# Patient Record
Sex: FEMALE | Race: WHITE | NOT HISPANIC OR LATINO | Employment: FULL TIME | URBAN - METROPOLITAN AREA
[De-identification: names, ages, dates, MRNs, and addresses within clinical notes are randomized per-mention and may not be internally consistent; named-entity substitution may affect disease eponyms.]

---

## 2017-04-06 ENCOUNTER — COMMUNICATION - HEALTHEAST (OUTPATIENT)
Dept: FAMILY MEDICINE | Facility: CLINIC | Age: 57
End: 2017-04-06

## 2017-05-08 ENCOUNTER — OFFICE VISIT - HEALTHEAST (OUTPATIENT)
Dept: CARDIOLOGY | Facility: CLINIC | Age: 57
End: 2017-05-08

## 2017-05-08 DIAGNOSIS — G45.9 TIA (TRANSIENT ISCHEMIC ATTACK): ICD-10-CM

## 2017-05-08 DIAGNOSIS — R00.1 SINUS BRADYCARDIA: ICD-10-CM

## 2017-05-08 DIAGNOSIS — I35.8 LAMBL'S EXCRESCENCE ON AORTIC VALVE: ICD-10-CM

## 2017-05-08 ASSESSMENT — MIFFLIN-ST. JEOR: SCORE: 985.03

## 2017-05-17 ENCOUNTER — HOSPITAL ENCOUNTER (OUTPATIENT)
Dept: CARDIOLOGY | Facility: HOSPITAL | Age: 57
Discharge: HOME | End: 2017-05-17
Attending: INTERNAL MEDICINE

## 2017-05-17 ENCOUNTER — RECORDS - HEALTHEAST (OUTPATIENT)
Dept: ADMINISTRATIVE | Facility: OTHER | Age: 57
End: 2017-05-17

## 2017-05-17 ENCOUNTER — OFFICE VISIT - HEALTHEAST (OUTPATIENT)
Dept: FAMILY MEDICINE | Facility: CLINIC | Age: 57
End: 2017-05-17

## 2017-05-17 DIAGNOSIS — M75.21 BICEPS TENDONITIS, RIGHT: ICD-10-CM

## 2017-05-17 DIAGNOSIS — M62.830 SPASM OF THORACIC BACK MUSCLE: ICD-10-CM

## 2017-05-17 DIAGNOSIS — M79.10 MUSCLE ACHE: ICD-10-CM

## 2017-05-17 DIAGNOSIS — H53.40 VISUAL FIELD CUT: ICD-10-CM

## 2017-05-17 DIAGNOSIS — G45.9 TIA (TRANSIENT ISCHEMIC ATTACK): ICD-10-CM

## 2017-05-17 ASSESSMENT — MIFFLIN-ST. JEOR: SCORE: 984.12

## 2017-05-18 LAB
AORTIC ROOT: 2.6 CM
BSA FOR ECHO PROCEDURE: 1.42 M2
CV BLOOD PRESSURE: NORMAL MMHG
CV ECHO HEIGHT: 61 IN
CV ECHO WEIGHT: 104 LBS
DOP CALC LVOT AREA: 2.54 CM2
DOP CALC LVOT DIAMETER: 1.8 CM
DOP CALC LVOT PEAK VEL: 108 CM/S
DOP CALC LVOT STROKE VOLUME: 75.3 CM3
DOP CALCLVOT PEAK VEL VTI: 29.6 CM
EJECTION FRACTION: 55 % (ref 55–75)
FRACTIONAL SHORTENING: 40 % (ref 28–44)
INTERVENTRICULAR SEPTUM IN END DIASTOLE: 0.9 CM (ref 0.6–0.9)
IVS/PW RATIO: 1.1
LEFT ATRIUM AREA: 10.1 CM2
LEFT ATRIUM LENGTH: 4 CM
LEFT ATRIUM SIZE: 3.1 CM
LEFT ATRIUM TO AORTIC ROOT RATIO: 1.19 NO UNITS
LEFT VENTRICLE CARDIAC INDEX: 2 L/MIN/M2
LEFT VENTRICLE CARDIAC OUTPUT: 2.9 L/MIN
LEFT VENTRICLE DIASTOLIC VOLUME INDEX: 42.3 CM3/M2 (ref 34–74)
LEFT VENTRICLE DIASTOLIC VOLUME: 60 CM3 (ref 46–106)
LEFT VENTRICLE HEART RATE: 38 BPM
LEFT VENTRICLE MASS INDEX: 71.4 G/M2
LEFT VENTRICLE SYSTOLIC VOLUME INDEX: 19 CM3/M2 (ref 11–31)
LEFT VENTRICLE SYSTOLIC VOLUME: 27 CM3 (ref 14–42)
LEFT VENTRICULAR INTERNAL DIMENSION IN DIASTOLE: 4 CM (ref 3.8–5.2)
LEFT VENTRICULAR INTERNAL DIMENSION IN SYSTOLE: 2.4 CM (ref 2.2–3.5)
LEFT VENTRICULAR MASS: 101.4 G
LEFT VENTRICULAR OUTFLOW TRACT MEAN GRADIENT: 3 MMHG
LEFT VENTRICULAR OUTFLOW TRACT MEAN VELOCITY: 75.1 CM/S
LEFT VENTRICULAR OUTFLOW TRACT PEAK GRADIENT: 5 MMHG
LEFT VENTRICULAR POSTERIOR WALL IN END DIASTOLE: 0.8 CM (ref 0.6–0.9)
LV STROKE VOLUME INDEX: 53 ML/M2
MITRAL VALVE E/A RATIO: 2.8
MV AVERAGE E/E' RATIO: 12.4 CM/S
MV DECELERATION TIME: 174 MS
MV E'TISSUE VEL-LAT: 9.07 CM/S
MV E'TISSUE VEL-MED: 8.68 CM/S
MV LATERAL E/E' RATIO: 12.1
MV MEDIAL E/E' RATIO: 12.7
MV PEAK A VELOCITY: 39.5 CM/S
MV PEAK E VELOCITY: 110 CM/S
NUC REST DIASTOLIC VOLUME INDEX: 1664 LBS
NUC REST SYSTOLIC VOLUME INDEX: 61 IN
TRICUSPID REGURGITATION PEAK PRESSURE GRADIENT: 13.1 MMHG
TRICUSPID VALVE ANULAR PLANE SYSTOLIC EXCURSION: 1.8 CM
TRICUSPID VALVE PEAK REGURGITANT VELOCITY: 181 CM/S

## 2017-05-30 ENCOUNTER — COMMUNICATION - HEALTHEAST (OUTPATIENT)
Dept: CARDIOLOGY | Facility: CLINIC | Age: 57
End: 2017-05-30

## 2017-05-30 ENCOUNTER — RECORDS - HEALTHEAST (OUTPATIENT)
Dept: ADMINISTRATIVE | Facility: OTHER | Age: 57
End: 2017-05-30

## 2017-06-01 ENCOUNTER — COMMUNICATION - HEALTHEAST (OUTPATIENT)
Dept: FAMILY MEDICINE | Facility: CLINIC | Age: 57
End: 2017-06-01

## 2017-06-19 ENCOUNTER — COMMUNICATION - HEALTHEAST (OUTPATIENT)
Dept: FAMILY MEDICINE | Facility: CLINIC | Age: 57
End: 2017-06-19

## 2017-06-19 ENCOUNTER — COMMUNICATION - HEALTHEAST (OUTPATIENT)
Dept: CARDIOLOGY | Facility: CLINIC | Age: 57
End: 2017-06-19

## 2017-08-14 ENCOUNTER — OFFICE VISIT - HEALTHEAST (OUTPATIENT)
Dept: CARDIOLOGY | Facility: CLINIC | Age: 57
End: 2017-08-14

## 2017-08-14 DIAGNOSIS — G45.9 TIA (TRANSIENT ISCHEMIC ATTACK): ICD-10-CM

## 2017-08-14 ASSESSMENT — MIFFLIN-ST. JEOR: SCORE: 990.01

## 2017-09-23 ENCOUNTER — RECORDS - HEALTHEAST (OUTPATIENT)
Dept: ADMINISTRATIVE | Facility: OTHER | Age: 57
End: 2017-09-23

## 2017-09-29 ENCOUNTER — RECORDS - HEALTHEAST (OUTPATIENT)
Dept: ADMINISTRATIVE | Facility: OTHER | Age: 57
End: 2017-09-29

## 2017-10-09 ENCOUNTER — HOSPITAL ENCOUNTER (OUTPATIENT)
Dept: MAMMOGRAPHY | Facility: HOSPITAL | Age: 57
Discharge: HOME | End: 2017-10-09
Attending: FAMILY MEDICINE

## 2017-10-09 DIAGNOSIS — Z12.31 VISIT FOR SCREENING MAMMOGRAM: ICD-10-CM

## 2017-11-20 ENCOUNTER — HOSPITAL ENCOUNTER (OUTPATIENT)
Dept: CARDIOLOGY | Facility: HOSPITAL | Age: 57
Discharge: HOME | End: 2017-11-20
Attending: INTERNAL MEDICINE

## 2017-11-20 DIAGNOSIS — G45.9 TIA (TRANSIENT ISCHEMIC ATTACK): ICD-10-CM

## 2017-11-20 LAB
AORTIC ROOT: 2.6 CM
BSA FOR ECHO PROCEDURE: 1.41 M2
CV BLOOD PRESSURE: NORMAL MMHG
CV ECHO HEIGHT: 62 IN
CV ECHO WEIGHT: 101 LBS
DOP CALC LVOT AREA: 2.54 CM2
DOP CALC LVOT DIAMETER: 1.8 CM
DOP CALC LVOT PEAK VEL: 109 CM/S
DOP CALC LVOT STROKE VOLUME: 62.3 CM3
DOP CALCLVOT PEAK VEL VTI: 24.5 CM
EJECTION FRACTION: 58 % (ref 55–75)
FRACTIONAL SHORTENING: 30.1 % (ref 28–44)
INTERVENTRICULAR SEPTUM IN END DIASTOLE: 0.73 CM (ref 0.6–0.9)
IVS/PW RATIO: 1
LA AREA 1: 15 CM2
LA AREA 2: 11 CM2
LEFT ATRIUM LENGTH: 4.3 CM
LEFT ATRIUM SIZE: 2.7 CM
LEFT ATRIUM VOLUME INDEX: 23.1 ML/M2
LEFT ATRIUM VOLUME: 32.6 CM3
LEFT VENTRICLE CARDIAC INDEX: 2.2 L/MIN/M2
LEFT VENTRICLE CARDIAC OUTPUT: 3.1 L/MIN
LEFT VENTRICLE DIASTOLIC VOLUME INDEX: 56.2 CM3/M2 (ref 34–74)
LEFT VENTRICLE DIASTOLIC VOLUME: 79.2 CM3 (ref 46–106)
LEFT VENTRICLE HEART RATE: 50 BPM
LEFT VENTRICLE MASS INDEX: 64.2 G/M2
LEFT VENTRICLE SYSTOLIC VOLUME INDEX: 23.4 CM3/M2 (ref 11–31)
LEFT VENTRICLE SYSTOLIC VOLUME: 33 CM3 (ref 14–42)
LEFT VENTRICULAR INTERNAL DIMENSION IN DIASTOLE: 4.25 CM (ref 3.8–5.2)
LEFT VENTRICULAR INTERNAL DIMENSION IN SYSTOLE: 2.97 CM (ref 2.2–3.5)
LEFT VENTRICULAR MASS: 90.5 G
LEFT VENTRICULAR OUTFLOW TRACT MEAN GRADIENT: 3 MMHG
LEFT VENTRICULAR OUTFLOW TRACT MEAN VELOCITY: 77.9 CM/S
LEFT VENTRICULAR OUTFLOW TRACT PEAK GRADIENT: 5 MMHG
LEFT VENTRICULAR POSTERIOR WALL IN END DIASTOLE: 0.71 CM (ref 0.6–0.9)
LV STROKE VOLUME INDEX: 44.2 ML/M2
MITRAL VALVE DECELERATION SLOPE: 3280 MM/S2
MITRAL VALVE DECELERATION SLOPE: 3280 MM/S2
MITRAL VALVE E/A RATIO: 1.6
MITRAL VALVE PRESSURE HALF-TIME: 78 MS
MV AVERAGE E/E' RATIO: 8.3 CM/S
MV E'TISSUE VEL-LAT: 11.9 CM/S
MV E'TISSUE VEL-MED: 9.28 CM/S
MV LATERAL E/E' RATIO: 7.4
MV MEDIAL E/E' RATIO: 9.5
MV PEAK A VELOCITY: 54.8 CM/S
MV PEAK E VELOCITY: 88.3 CM/S
MV VALVE AREA PRESSURE 1/2 METHOD: 2.8 CM2
NUC REST DIASTOLIC VOLUME INDEX: 1616 LBS
NUC REST SYSTOLIC VOLUME INDEX: 62 IN
TRICUSPID VALVE ANULAR PLANE SYSTOLIC EXCURSION: 2.1 CM

## 2017-11-20 ASSESSMENT — MIFFLIN-ST. JEOR: SCORE: 986.38

## 2017-11-21 ENCOUNTER — RECORDS - HEALTHEAST (OUTPATIENT)
Dept: ADMINISTRATIVE | Facility: OTHER | Age: 57
End: 2017-11-21

## 2017-11-21 ENCOUNTER — AMBULATORY - HEALTHEAST (OUTPATIENT)
Dept: MULTI SPECIALTY CLINIC | Facility: CLINIC | Age: 57
End: 2017-11-21

## 2017-11-21 LAB — PAP SMEAR - HIM PATIENT REPORTED: NORMAL

## 2017-11-22 ENCOUNTER — AMBULATORY - HEALTHEAST (OUTPATIENT)
Dept: SCHEDULING | Facility: CLINIC | Age: 57
End: 2017-11-22

## 2017-11-22 ENCOUNTER — RECORDS - HEALTHEAST (OUTPATIENT)
Dept: ADMINISTRATIVE | Facility: OTHER | Age: 57
End: 2017-11-22

## 2017-11-22 DIAGNOSIS — M85.80 OSTEOPENIA: ICD-10-CM

## 2017-11-22 DIAGNOSIS — Z78.0 MENOPAUSE: ICD-10-CM

## 2017-12-29 ENCOUNTER — OFFICE VISIT - HEALTHEAST (OUTPATIENT)
Dept: FAMILY MEDICINE | Facility: CLINIC | Age: 57
End: 2017-12-29

## 2017-12-29 DIAGNOSIS — H61.23 BILATERAL IMPACTED CERUMEN: ICD-10-CM

## 2017-12-29 DIAGNOSIS — R07.81 RIB PAIN ON RIGHT SIDE: ICD-10-CM

## 2018-01-02 ENCOUNTER — RECORDS - HEALTHEAST (OUTPATIENT)
Dept: ADMINISTRATIVE | Facility: OTHER | Age: 58
End: 2018-01-02

## 2018-01-25 ENCOUNTER — OFFICE VISIT - HEALTHEAST (OUTPATIENT)
Dept: FAMILY MEDICINE | Facility: CLINIC | Age: 58
End: 2018-01-25

## 2018-01-25 DIAGNOSIS — K60.2 ANAL FISSURE: ICD-10-CM

## 2018-01-25 DIAGNOSIS — N76.0 VAGINITIS: ICD-10-CM

## 2018-01-25 LAB
ALBUMIN UR-MCNC: ABNORMAL MG/DL
APPEARANCE UR: CLEAR
BILIRUB UR QL STRIP: NEGATIVE
COLOR UR AUTO: YELLOW
GLUCOSE UR STRIP-MCNC: NEGATIVE MG/DL
HGB UR QL STRIP: ABNORMAL
KETONES UR STRIP-MCNC: NEGATIVE MG/DL
LEUKOCYTE ESTERASE UR QL STRIP: ABNORMAL
NITRATE UR QL: NEGATIVE
PH UR STRIP: 7.5 [PH] (ref 5–8)
SP GR UR STRIP: 1.01 (ref 1–1.03)
UROBILINOGEN UR STRIP-ACNC: ABNORMAL

## 2018-01-25 ASSESSMENT — MIFFLIN-ST. JEOR: SCORE: 1006.34

## 2018-01-27 LAB — BACTERIA SPEC CULT: NO GROWTH

## 2018-01-28 LAB
MISCELLANEOUS TEST DEPT. - HE HISTORICAL: NORMAL
PERFORMING LAB: NORMAL
SPECIMEN STATUS: NORMAL
TEST NAME: NORMAL

## 2018-04-13 ENCOUNTER — OFFICE VISIT - HEALTHEAST (OUTPATIENT)
Dept: ENDOCRINOLOGY | Facility: CLINIC | Age: 58
End: 2018-04-13

## 2018-04-13 ENCOUNTER — COMMUNICATION - HEALTHEAST (OUTPATIENT)
Dept: ENDOCRINOLOGY | Facility: CLINIC | Age: 58
End: 2018-04-13

## 2018-04-13 DIAGNOSIS — E03.9 HYPOTHYROID: ICD-10-CM

## 2018-04-13 DIAGNOSIS — M81.0 OSTEOPOROSIS: ICD-10-CM

## 2018-04-13 LAB
CALCIUM SERPL-MCNC: 9.4 MG/DL (ref 8.5–10.5)
CREAT SERPL-MCNC: 0.68 MG/DL (ref 0.6–1.1)
GFR SERPL CREATININE-BSD FRML MDRD: >60 ML/MIN/1.73M2
POTASSIUM BLD-SCNC: 4.4 MMOL/L (ref 3.5–5)
PTH-INTACT SERPL-MCNC: 112 PG/ML (ref 10–86)
T3 SERPL-MCNC: 67 NG/DL (ref 45–175)
T4 FREE SERPL-MCNC: 0.7 NG/DL (ref 0.7–1.8)
TSH SERPL DL<=0.005 MIU/L-ACNC: 2.17 UIU/ML (ref 0.3–5)

## 2018-04-13 ASSESSMENT — MIFFLIN-ST. JEOR: SCORE: 1015.87

## 2018-04-16 ENCOUNTER — COMMUNICATION - HEALTHEAST (OUTPATIENT)
Dept: ENDOCRINOLOGY | Facility: CLINIC | Age: 58
End: 2018-04-16

## 2018-04-16 LAB
25(OH)D3 SERPL-MCNC: 51.4 NG/ML (ref 30–80)
25(OH)D3 SERPL-MCNC: 51.4 NG/ML (ref 30–80)
PHOSPHATE SERPL-MCNC: 3.4 MG/DL (ref 2.5–4.5)

## 2018-04-17 ENCOUNTER — COMMUNICATION - HEALTHEAST (OUTPATIENT)
Dept: ADMINISTRATIVE | Facility: CLINIC | Age: 58
End: 2018-04-17

## 2018-04-18 ENCOUNTER — AMBULATORY - HEALTHEAST (OUTPATIENT)
Dept: ENDOCRINOLOGY | Facility: CLINIC | Age: 58
End: 2018-04-18

## 2018-04-18 LAB — 1,25(OH)2D SERPL-MCNC: 45 PG/ML (ref 18–78)

## 2018-04-23 ENCOUNTER — COMMUNICATION - HEALTHEAST (OUTPATIENT)
Dept: ENDOCRINOLOGY | Facility: CLINIC | Age: 58
End: 2018-04-23

## 2018-04-24 ENCOUNTER — COMMUNICATION - HEALTHEAST (OUTPATIENT)
Dept: FAMILY MEDICINE | Facility: CLINIC | Age: 58
End: 2018-04-24

## 2018-04-26 ENCOUNTER — OFFICE VISIT - HEALTHEAST (OUTPATIENT)
Dept: FAMILY MEDICINE | Facility: CLINIC | Age: 58
End: 2018-04-26

## 2018-04-26 ENCOUNTER — COMMUNICATION - HEALTHEAST (OUTPATIENT)
Dept: ENDOCRINOLOGY | Facility: CLINIC | Age: 58
End: 2018-04-26

## 2018-04-26 ENCOUNTER — COMMUNICATION - HEALTHEAST (OUTPATIENT)
Dept: FAMILY MEDICINE | Facility: CLINIC | Age: 58
End: 2018-04-26

## 2018-04-26 DIAGNOSIS — R43.0 LOSS OF SENSE OF SMELL: ICD-10-CM

## 2018-04-26 DIAGNOSIS — L65.9 HAIR LOSS: ICD-10-CM

## 2018-04-26 DIAGNOSIS — M25.50 JOINT PAIN: ICD-10-CM

## 2018-04-26 DIAGNOSIS — R51.9 HEADACHE: ICD-10-CM

## 2018-04-26 DIAGNOSIS — R10.9 ABDOMINAL PAIN: ICD-10-CM

## 2018-04-26 LAB
ALBUMIN SERPL-MCNC: 4 G/DL (ref 3.5–5)
ALP SERPL-CCNC: 88 U/L (ref 45–120)
ALT SERPL W P-5'-P-CCNC: 16 U/L (ref 0–45)
AMYLASE SERPL-CCNC: 150 U/L (ref 5–120)
ANION GAP SERPL CALCULATED.3IONS-SCNC: 12 MMOL/L (ref 5–18)
AST SERPL W P-5'-P-CCNC: 33 U/L (ref 0–40)
BILIRUB SERPL-MCNC: 1.4 MG/DL (ref 0–1)
BUN SERPL-MCNC: 14 MG/DL (ref 8–22)
CALCIUM SERPL-MCNC: 10.6 MG/DL (ref 8.5–10.5)
CHLORIDE BLD-SCNC: 104 MMOL/L (ref 98–107)
CK SERPL-CCNC: 89 U/L (ref 30–190)
CO2 SERPL-SCNC: 26 MMOL/L (ref 22–31)
CORTIS SERPL-MCNC: 12.3 UG/DL
CREAT SERPL-MCNC: 0.8 MG/DL (ref 0.6–1.1)
ERYTHROCYTE [SEDIMENTATION RATE] IN BLOOD BY WESTERGREN METHOD: 8 MM/HR (ref 0–20)
GFR SERPL CREATININE-BSD FRML MDRD: >60 ML/MIN/1.73M2
GLUCOSE BLD-MCNC: 104 MG/DL (ref 70–125)
LIPASE SERPL-CCNC: 81 U/L (ref 0–52)
POTASSIUM BLD-SCNC: 4.4 MMOL/L (ref 3.5–5)
PROT SERPL-MCNC: 7.2 G/DL (ref 6–8)
SODIUM SERPL-SCNC: 142 MMOL/L (ref 136–145)

## 2018-04-27 LAB — HCV AB SERPL QL IA: NEGATIVE

## 2018-04-28 ENCOUNTER — COMMUNICATION - HEALTHEAST (OUTPATIENT)
Dept: FAMILY MEDICINE | Facility: CLINIC | Age: 58
End: 2018-04-28

## 2018-04-30 ENCOUNTER — COMMUNICATION - HEALTHEAST (OUTPATIENT)
Dept: FAMILY MEDICINE | Facility: CLINIC | Age: 58
End: 2018-04-30

## 2018-04-30 ENCOUNTER — AMBULATORY - HEALTHEAST (OUTPATIENT)
Dept: FAMILY MEDICINE | Facility: CLINIC | Age: 58
End: 2018-04-30

## 2018-04-30 ENCOUNTER — HOSPITAL ENCOUNTER (OUTPATIENT)
Dept: CT IMAGING | Facility: HOSPITAL | Age: 58
Discharge: HOME | End: 2018-04-30
Attending: FAMILY MEDICINE

## 2018-04-30 DIAGNOSIS — R10.9 ABDOMINAL PAIN: ICD-10-CM

## 2018-04-30 LAB — ANA SER QL: 0.3 U

## 2018-05-01 ENCOUNTER — COMMUNICATION - HEALTHEAST (OUTPATIENT)
Dept: FAMILY MEDICINE | Facility: CLINIC | Age: 58
End: 2018-05-01

## 2018-05-09 ENCOUNTER — COMMUNICATION - HEALTHEAST (OUTPATIENT)
Dept: ENDOCRINOLOGY | Facility: CLINIC | Age: 58
End: 2018-05-09

## 2018-05-09 DIAGNOSIS — M81.0 OSTEOPOROSIS: ICD-10-CM

## 2018-06-06 ENCOUNTER — COMMUNICATION - HEALTHEAST (OUTPATIENT)
Dept: ADMINISTRATIVE | Facility: CLINIC | Age: 58
End: 2018-06-06

## 2018-06-20 ENCOUNTER — RECORDS - HEALTHEAST (OUTPATIENT)
Dept: ADMINISTRATIVE | Facility: OTHER | Age: 58
End: 2018-06-20

## 2018-06-21 ENCOUNTER — RECORDS - HEALTHEAST (OUTPATIENT)
Dept: ADMINISTRATIVE | Facility: OTHER | Age: 58
End: 2018-06-21

## 2018-06-26 ENCOUNTER — HOSPITAL ENCOUNTER (OUTPATIENT)
Dept: MRI IMAGING | Facility: HOSPITAL | Age: 58
Discharge: HOME | End: 2018-06-26
Attending: INTERNAL MEDICINE

## 2018-06-26 DIAGNOSIS — R74.8 ELEVATED AMYLASE AND LIPASE: ICD-10-CM

## 2018-06-27 ENCOUNTER — HOSPITAL ENCOUNTER (OUTPATIENT)
Dept: MRI IMAGING | Facility: HOSPITAL | Age: 58
Discharge: HOME | End: 2018-06-27
Attending: FAMILY MEDICINE

## 2018-06-27 DIAGNOSIS — R51.9 HEADACHE: ICD-10-CM

## 2018-06-27 DIAGNOSIS — R43.0 LOSS OF SENSE OF SMELL: ICD-10-CM

## 2018-07-16 ENCOUNTER — COMMUNICATION - HEALTHEAST (OUTPATIENT)
Dept: ADMINISTRATIVE | Facility: CLINIC | Age: 58
End: 2018-07-16

## 2018-07-26 ENCOUNTER — RECORDS - HEALTHEAST (OUTPATIENT)
Dept: LAB | Facility: HOSPITAL | Age: 58
End: 2018-07-26

## 2018-07-26 LAB
HBV SURFACE AG SERPL QL IA: NEGATIVE
HCV AB SERPL QL IA: NEGATIVE
HIV 1+2 AB+HIV1 P24 AG SERPL QL IA: NEGATIVE

## 2018-08-01 ENCOUNTER — COMMUNICATION - HEALTHEAST (OUTPATIENT)
Dept: ENDOCRINOLOGY | Facility: CLINIC | Age: 58
End: 2018-08-01

## 2018-08-01 DIAGNOSIS — M81.0 OSTEOPOROSIS: ICD-10-CM

## 2018-08-20 ENCOUNTER — OFFICE VISIT - HEALTHEAST (OUTPATIENT)
Dept: CARDIOLOGY | Facility: CLINIC | Age: 58
End: 2018-08-20

## 2018-08-20 DIAGNOSIS — R42 DIZZINESS: ICD-10-CM

## 2018-08-20 DIAGNOSIS — M79.622 PAIN OF LEFT UPPER ARM: ICD-10-CM

## 2018-08-20 DIAGNOSIS — Z82.49 FAMILY HISTORY OF CORONARY ARTERY DISEASE: ICD-10-CM

## 2018-08-20 LAB
ATRIAL RATE - MUSE: 35 BPM
DIASTOLIC BLOOD PRESSURE - MUSE: NORMAL MMHG
INTERPRETATION ECG - MUSE: NORMAL
P AXIS - MUSE: -7 DEGREES
PR INTERVAL - MUSE: 122 MS
QRS DURATION - MUSE: 88 MS
QT - MUSE: 478 MS
QTC - MUSE: 364 MS
R AXIS - MUSE: 78 DEGREES
SYSTOLIC BLOOD PRESSURE - MUSE: NORMAL MMHG
T AXIS - MUSE: 57 DEGREES
VENTRICULAR RATE- MUSE: 35 BPM

## 2018-08-20 ASSESSMENT — MIFFLIN-ST. JEOR: SCORE: 1013.6

## 2018-08-22 ENCOUNTER — HOSPITAL ENCOUNTER (OUTPATIENT)
Dept: CT IMAGING | Facility: CLINIC | Age: 58
Discharge: HOME | End: 2018-08-22
Attending: INTERNAL MEDICINE

## 2018-08-22 ENCOUNTER — COMMUNICATION - HEALTHEAST (OUTPATIENT)
Dept: CARDIOLOGY | Facility: CLINIC | Age: 58
End: 2018-08-22

## 2018-08-22 DIAGNOSIS — Z82.49 FAMILY HISTORY OF CORONARY ARTERY DISEASE: ICD-10-CM

## 2018-08-22 LAB
CV CALCIUM SCORE AGATSTON LM: 0
CV CALCIUM SCORING AGATSON LAD: 0
CV CALCIUM SCORING AGATSTON CX: 0
CV CALCIUM SCORING AGATSTON RCA: 0
CV CALCIUM SCORING AGATSTON TOTAL: 0

## 2018-08-27 ENCOUNTER — COMMUNICATION - HEALTHEAST (OUTPATIENT)
Dept: CARDIOLOGY | Facility: CLINIC | Age: 58
End: 2018-08-27

## 2018-08-28 ENCOUNTER — HOSPITAL ENCOUNTER (OUTPATIENT)
Dept: CARDIOLOGY | Facility: HOSPITAL | Age: 58
Discharge: HOME | End: 2018-08-28
Attending: INTERNAL MEDICINE

## 2018-08-28 DIAGNOSIS — M79.622 PAIN OF LEFT UPPER ARM: ICD-10-CM

## 2018-08-28 LAB
CV STRESS CURRENT BP HE: NORMAL
CV STRESS CURRENT HR HE: 105
CV STRESS CURRENT HR HE: 109
CV STRESS CURRENT HR HE: 110
CV STRESS CURRENT HR HE: 111
CV STRESS CURRENT HR HE: 114
CV STRESS CURRENT HR HE: 118
CV STRESS CURRENT HR HE: 118
CV STRESS CURRENT HR HE: 121
CV STRESS CURRENT HR HE: 123
CV STRESS CURRENT HR HE: 146
CV STRESS CURRENT HR HE: 149
CV STRESS CURRENT HR HE: 154
CV STRESS CURRENT HR HE: 154
CV STRESS CURRENT HR HE: 155
CV STRESS CURRENT HR HE: 155
CV STRESS CURRENT HR HE: 51
CV STRESS CURRENT HR HE: 52
CV STRESS CURRENT HR HE: 56
CV STRESS CURRENT HR HE: 59
CV STRESS CURRENT HR HE: 61
CV STRESS CURRENT HR HE: 67
CV STRESS CURRENT HR HE: 69
CV STRESS CURRENT HR HE: 72
CV STRESS CURRENT HR HE: 74
CV STRESS CURRENT HR HE: 74
CV STRESS CURRENT HR HE: 76
CV STRESS CURRENT HR HE: 77
CV STRESS CURRENT HR HE: 79
CV STRESS CURRENT HR HE: 90
CV STRESS CURRENT HR HE: 92
CV STRESS CURRENT HR HE: 93
CV STRESS CURRENT HR HE: 95
CV STRESS CURRENT HR HE: 97
CV STRESS DEVIATION TIME HE: NORMAL
CV STRESS ECHO PERCENT HR HE: NORMAL
CV STRESS EXERCISE STAGE HE: NORMAL
CV STRESS FINAL RESTING BP HE: NORMAL
CV STRESS FINAL RESTING HR HE: 56
CV STRESS MAX HR HE: 155
CV STRESS MAX TREADMILL GRADE HE: 18
CV STRESS MAX TREADMILL SPEED HE: 5
CV STRESS PEAK DIA BP HE: NORMAL
CV STRESS PEAK SYS BP HE: NORMAL
CV STRESS PHASE HE: NORMAL
CV STRESS PROTOCOL HE: NORMAL
CV STRESS REASON STOPPED HE: NORMAL
CV STRESS RESTING PT POSITION HE: NORMAL
CV STRESS RESTING PT POSITION HE: NORMAL
CV STRESS ST DEVIATION AMOUNT HE: NORMAL
CV STRESS ST DEVIATION ELEVATION HE: NORMAL
CV STRESS ST EVELATION AMOUNT HE: NORMAL
CV STRESS SYMPTOMS HE: NORMAL
CV STRESS TEST TYPE HE: NORMAL
CV STRESS TOTAL STAGE TIME MIN 1 HE: NORMAL
ECHO EJECTION FRACTION ESTIMATED: 55 %
STRESS ECHO BASELINE BP: NORMAL
STRESS ECHO BASELINE HR: 70
STRESS ECHO CALCULATED PERCENT HR: 95 %
STRESS ECHO LAST STRESS BP: NORMAL
STRESS ECHO LAST STRESS HR: 155
STRESS ECHO POST ESTIMATED WORKLOAD: 12.3
STRESS ECHO POST EXERCISE DUR MIN: 12
STRESS ECHO POST EXERCISE DUR SEC: 0
STRESS ECHO TARGET HR: 139

## 2018-08-30 ENCOUNTER — COMMUNICATION - HEALTHEAST (OUTPATIENT)
Dept: CARDIOLOGY | Facility: CLINIC | Age: 58
End: 2018-08-30

## 2018-09-05 ENCOUNTER — HOSPITAL ENCOUNTER (OUTPATIENT)
Dept: CARDIOLOGY | Facility: HOSPITAL | Age: 58
Discharge: HOME | End: 2018-09-05
Attending: INTERNAL MEDICINE

## 2018-09-05 DIAGNOSIS — R42 DIZZINESS: ICD-10-CM

## 2018-09-23 ENCOUNTER — COMMUNICATION - HEALTHEAST (OUTPATIENT)
Dept: CARDIOLOGY | Facility: CLINIC | Age: 58
End: 2018-09-23

## 2018-09-24 ENCOUNTER — RECORDS - HEALTHEAST (OUTPATIENT)
Dept: ADMINISTRATIVE | Facility: OTHER | Age: 58
End: 2018-09-24

## 2018-09-27 ENCOUNTER — COMMUNICATION - HEALTHEAST (OUTPATIENT)
Dept: CARDIOLOGY | Facility: CLINIC | Age: 58
End: 2018-09-27

## 2018-11-02 ENCOUNTER — HOSPITAL ENCOUNTER (OUTPATIENT)
Dept: MAMMOGRAPHY | Facility: CLINIC | Age: 58
Discharge: HOME | End: 2018-11-02
Attending: FAMILY MEDICINE

## 2018-11-02 DIAGNOSIS — Z12.31 VISIT FOR SCREENING MAMMOGRAM: ICD-10-CM

## 2018-11-15 ENCOUNTER — HOSPITAL ENCOUNTER (OUTPATIENT)
Dept: MRI IMAGING | Facility: HOSPITAL | Age: 58
Discharge: HOME | End: 2018-11-15
Attending: ORTHOPAEDIC SURGERY

## 2018-11-15 DIAGNOSIS — M54.2 NECK PAIN: ICD-10-CM

## 2018-11-15 DIAGNOSIS — M25.512 PAIN IN JOINT OF LEFT SHOULDER: ICD-10-CM

## 2018-11-21 ENCOUNTER — OFFICE VISIT - HEALTHEAST (OUTPATIENT)
Dept: CARDIOLOGY | Facility: CLINIC | Age: 58
End: 2018-11-21

## 2018-11-21 DIAGNOSIS — R00.1 SINUS BRADYCARDIA: ICD-10-CM

## 2018-11-21 DIAGNOSIS — R42 DIZZINESS AND GIDDINESS: ICD-10-CM

## 2018-11-21 ASSESSMENT — MIFFLIN-ST. JEOR: SCORE: 1009.06

## 2018-11-30 ENCOUNTER — COMMUNICATION - HEALTHEAST (OUTPATIENT)
Dept: ENDOCRINOLOGY | Facility: CLINIC | Age: 58
End: 2018-11-30

## 2018-11-30 DIAGNOSIS — E03.9 HYPOTHYROID: ICD-10-CM

## 2018-11-30 DIAGNOSIS — M81.0 OSTEOPOROSIS: ICD-10-CM

## 2018-11-30 DIAGNOSIS — E55.9 VITAMIN D DEFICIENCY: ICD-10-CM

## 2018-12-03 ENCOUNTER — AMBULATORY - HEALTHEAST (OUTPATIENT)
Dept: LAB | Facility: CLINIC | Age: 58
End: 2018-12-03

## 2018-12-03 DIAGNOSIS — E03.9 HYPOTHYROID: ICD-10-CM

## 2018-12-03 DIAGNOSIS — M81.0 OSTEOPOROSIS: ICD-10-CM

## 2018-12-03 DIAGNOSIS — E55.9 VITAMIN D DEFICIENCY: ICD-10-CM

## 2018-12-03 LAB
CALCIUM SERPL-MCNC: 9.7 MG/DL (ref 8.5–10.5)
CREAT SERPL-MCNC: 0.68 MG/DL (ref 0.6–1.1)
GFR SERPL CREATININE-BSD FRML MDRD: >60 ML/MIN/1.73M2
POTASSIUM BLD-SCNC: 4.2 MMOL/L (ref 3.5–5)
T3 SERPL-MCNC: 66 NG/DL (ref 45–175)
T4 FREE SERPL-MCNC: 0.8 NG/DL (ref 0.7–1.8)
TSH SERPL DL<=0.005 MIU/L-ACNC: 1.96 UIU/ML (ref 0.3–5)

## 2018-12-04 LAB
25(OH)D3 SERPL-MCNC: 27.7 NG/ML (ref 30–80)
25(OH)D3 SERPL-MCNC: 27.7 NG/ML (ref 30–80)

## 2018-12-21 ENCOUNTER — OFFICE VISIT - HEALTHEAST (OUTPATIENT)
Dept: ENDOCRINOLOGY | Facility: CLINIC | Age: 58
End: 2018-12-21

## 2018-12-21 ENCOUNTER — COMMUNICATION - HEALTHEAST (OUTPATIENT)
Dept: ENDOCRINOLOGY | Facility: CLINIC | Age: 58
End: 2018-12-21

## 2018-12-21 DIAGNOSIS — M81.0 AGE-RELATED OSTEOPOROSIS WITHOUT CURRENT PATHOLOGICAL FRACTURE: ICD-10-CM

## 2018-12-21 ASSESSMENT — MIFFLIN-ST. JEOR: SCORE: 994.55

## 2019-01-04 ENCOUNTER — RECORDS - HEALTHEAST (OUTPATIENT)
Dept: LAB | Facility: HOSPITAL | Age: 59
End: 2019-01-04

## 2019-01-04 LAB
MUV IGG SER QL IA: POSITIVE
VZV IGG SER QL IA: POSITIVE

## 2019-01-16 ENCOUNTER — COMMUNICATION - HEALTHEAST (OUTPATIENT)
Dept: ENDOCRINOLOGY | Facility: CLINIC | Age: 59
End: 2019-01-16

## 2019-01-29 ENCOUNTER — OFFICE VISIT - HEALTHEAST (OUTPATIENT)
Dept: FAMILY MEDICINE | Facility: CLINIC | Age: 59
End: 2019-01-29

## 2019-01-29 DIAGNOSIS — M54.2 NECK PAIN: ICD-10-CM

## 2019-01-29 DIAGNOSIS — K64.4 EXTERNAL HEMORRHOIDS: ICD-10-CM

## 2019-01-29 DIAGNOSIS — R20.2 PARESTHESIA: ICD-10-CM

## 2019-01-29 RX ORDER — ASPIRIN 325 MG
325 TABLET ORAL DAILY
Status: SHIPPED | COMMUNITY
Start: 2019-01-29 | End: 2022-07-05

## 2019-01-29 ASSESSMENT — MIFFLIN-ST. JEOR: SCORE: 988.65

## 2019-02-20 ENCOUNTER — COMMUNICATION - HEALTHEAST (OUTPATIENT)
Dept: ADMINISTRATIVE | Facility: CLINIC | Age: 59
End: 2019-02-20

## 2019-02-20 ENCOUNTER — OFFICE VISIT - HEALTHEAST (OUTPATIENT)
Dept: OTOLARYNGOLOGY | Facility: CLINIC | Age: 59
End: 2019-02-20

## 2019-02-20 DIAGNOSIS — H61.23 BILATERAL IMPACTED CERUMEN: ICD-10-CM

## 2019-02-21 ENCOUNTER — AMBULATORY - HEALTHEAST (OUTPATIENT)
Dept: ENDOCRINOLOGY | Facility: CLINIC | Age: 59
End: 2019-02-21

## 2019-02-21 ENCOUNTER — COMMUNICATION - HEALTHEAST (OUTPATIENT)
Dept: ENDOCRINOLOGY | Facility: CLINIC | Age: 59
End: 2019-02-21

## 2019-05-13 ENCOUNTER — OFFICE VISIT - HEALTHEAST (OUTPATIENT)
Dept: FAMILY MEDICINE | Facility: CLINIC | Age: 59
End: 2019-05-13

## 2019-05-13 DIAGNOSIS — G43.009 ATYPICAL MIGRAINE: ICD-10-CM

## 2019-05-13 DIAGNOSIS — L30.9 DERMATITIS: ICD-10-CM

## 2019-05-13 DIAGNOSIS — L02.92 BOIL: ICD-10-CM

## 2019-05-13 DIAGNOSIS — B37.31 VAGINAL CANDIDIASIS: ICD-10-CM

## 2019-05-13 DIAGNOSIS — R11.0 NAUSEA: ICD-10-CM

## 2019-05-19 ENCOUNTER — OFFICE VISIT - HEALTHEAST (OUTPATIENT)
Dept: FAMILY MEDICINE | Facility: CLINIC | Age: 59
End: 2019-05-19

## 2019-05-19 DIAGNOSIS — R51.9 HEADACHE: ICD-10-CM

## 2019-05-19 DIAGNOSIS — J01.10 ACUTE NON-RECURRENT FRONTAL SINUSITIS: ICD-10-CM

## 2019-05-19 DIAGNOSIS — R05.9 COUGH: ICD-10-CM

## 2019-05-19 LAB
DEPRECATED S PYO AG THROAT QL EIA: NORMAL
FLUAV AG SPEC QL IA: NORMAL
FLUBV AG SPEC QL IA: NORMAL

## 2019-05-20 LAB — GROUP A STREP BY PCR: NORMAL

## 2019-07-24 ENCOUNTER — COMMUNICATION - HEALTHEAST (OUTPATIENT)
Dept: ADMINISTRATIVE | Facility: CLINIC | Age: 59
End: 2019-07-24

## 2019-08-28 ENCOUNTER — AMBULATORY - HEALTHEAST (OUTPATIENT)
Dept: ENDOCRINOLOGY | Facility: CLINIC | Age: 59
End: 2019-08-28

## 2019-08-28 DIAGNOSIS — M81.0 AGE-RELATED OSTEOPOROSIS WITHOUT CURRENT PATHOLOGICAL FRACTURE: ICD-10-CM

## 2019-09-23 ENCOUNTER — RECORDS - HEALTHEAST (OUTPATIENT)
Dept: ADMINISTRATIVE | Facility: OTHER | Age: 59
End: 2019-09-23

## 2019-09-30 ENCOUNTER — OFFICE VISIT - HEALTHEAST (OUTPATIENT)
Dept: FAMILY MEDICINE | Facility: CLINIC | Age: 59
End: 2019-09-30

## 2019-09-30 DIAGNOSIS — M72.2 PLANTAR FASCIITIS: ICD-10-CM

## 2019-09-30 DIAGNOSIS — G56.22 ULNAR NEUROPATHY OF LEFT UPPER EXTREMITY: ICD-10-CM

## 2019-09-30 DIAGNOSIS — R63.4 UNINTENTIONAL WEIGHT LOSS: ICD-10-CM

## 2019-09-30 DIAGNOSIS — H53.40 VISUAL FIELD CUT: ICD-10-CM

## 2019-09-30 DIAGNOSIS — Z11.3 SCREEN FOR STD (SEXUALLY TRANSMITTED DISEASE): ICD-10-CM

## 2019-09-30 LAB
ALBUMIN SERPL-MCNC: 3.9 G/DL (ref 3.5–5)
ALP SERPL-CCNC: 64 U/L (ref 45–120)
ALT SERPL W P-5'-P-CCNC: 20 U/L (ref 0–45)
AMYLASE SERPL-CCNC: 134 U/L (ref 5–120)
ANION GAP SERPL CALCULATED.3IONS-SCNC: 10 MMOL/L (ref 5–18)
AST SERPL W P-5'-P-CCNC: 31 U/L (ref 0–40)
BASOPHILS # BLD AUTO: 0 THOU/UL (ref 0–0.2)
BASOPHILS NFR BLD AUTO: 1 % (ref 0–2)
BILIRUB SERPL-MCNC: 1.1 MG/DL (ref 0–1)
BUN SERPL-MCNC: 19 MG/DL (ref 8–22)
CALCIUM SERPL-MCNC: 9.1 MG/DL (ref 8.5–10.5)
CHLORIDE BLD-SCNC: 105 MMOL/L (ref 98–107)
CO2 SERPL-SCNC: 23 MMOL/L (ref 22–31)
CREAT SERPL-MCNC: 0.66 MG/DL (ref 0.6–1.1)
EOSINOPHIL # BLD AUTO: 0.2 THOU/UL (ref 0–0.4)
EOSINOPHIL NFR BLD AUTO: 4 % (ref 0–6)
ERYTHROCYTE [DISTWIDTH] IN BLOOD BY AUTOMATED COUNT: 12.8 % (ref 11–14.5)
GFR SERPL CREATININE-BSD FRML MDRD: >60 ML/MIN/1.73M2
GLUCOSE BLD-MCNC: 87 MG/DL (ref 70–125)
HCT VFR BLD AUTO: 37.3 % (ref 35–47)
HGB BLD-MCNC: 13 G/DL (ref 12–16)
LIPASE SERPL-CCNC: 121 U/L (ref 0–52)
LYMPHOCYTES # BLD AUTO: 1.6 THOU/UL (ref 0.8–4.4)
LYMPHOCYTES NFR BLD AUTO: 40 % (ref 20–40)
MAGNESIUM SERPL-MCNC: 1.9 MG/DL (ref 1.8–2.6)
MCH RBC QN AUTO: 32.5 PG (ref 27–34)
MCHC RBC AUTO-ENTMCNC: 34.9 G/DL (ref 32–36)
MCV RBC AUTO: 93 FL (ref 80–100)
MONOCYTES # BLD AUTO: 0.3 THOU/UL (ref 0–0.9)
MONOCYTES NFR BLD AUTO: 8 % (ref 2–10)
NEUTROPHILS # BLD AUTO: 2 THOU/UL (ref 2–7.7)
NEUTROPHILS NFR BLD AUTO: 49 % (ref 50–70)
PLATELET # BLD AUTO: 220 THOU/UL (ref 140–440)
PMV BLD AUTO: 6.8 FL (ref 7–10)
POTASSIUM BLD-SCNC: 4.2 MMOL/L (ref 3.5–5)
PROT SERPL-MCNC: 6.4 G/DL (ref 6–8)
RBC # BLD AUTO: 4 MILL/UL (ref 3.8–5.4)
SODIUM SERPL-SCNC: 138 MMOL/L (ref 136–145)
T3 SERPL-MCNC: 61 NG/DL (ref 45–175)
T3FREE SERPL-MCNC: 2.3 PG/ML (ref 1.9–3.9)
T4 FREE SERPL-MCNC: 0.8 NG/DL (ref 0.7–1.8)
TSH SERPL DL<=0.005 MIU/L-ACNC: 2.8 UIU/ML (ref 0.3–5)
WBC: 4.1 THOU/UL (ref 4–11)

## 2019-10-01 LAB
C TRACH DNA SPEC QL PROBE+SIG AMP: NEGATIVE
HIV 1+2 AB+HIV1 P24 AG SERPL QL IA: NEGATIVE
N GONORRHOEA DNA SPEC QL NAA+PROBE: NEGATIVE
VIT B12 SERPL-MCNC: 332 PG/ML (ref 213–816)

## 2019-10-02 LAB
25(OH)D3 SERPL-MCNC: 36.7 NG/ML (ref 30–80)
HCV AB SERPL QL IA: NEGATIVE
T PALLIDUM AB SER QL: NEGATIVE

## 2019-10-06 ENCOUNTER — OFFICE VISIT - HEALTHEAST (OUTPATIENT)
Dept: FAMILY MEDICINE | Facility: CLINIC | Age: 59
End: 2019-10-06
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
## 2019-10-06 DIAGNOSIS — R07.81 RIB PAIN ON LEFT SIDE: ICD-10-CM

## 2019-10-06 DIAGNOSIS — R10.9 FLANK PAIN: ICD-10-CM

## 2019-10-06 DIAGNOSIS — S69.92XA INJURY OF LEFT THUMB, INITIAL ENCOUNTER: ICD-10-CM
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
## 2019-10-06 DIAGNOSIS — M54.2 NECK PAIN: ICD-10-CM

## 2019-10-06 DIAGNOSIS — S22.42XA CLOSED FRACTURE OF MULTIPLE RIBS OF LEFT SIDE, INITIAL ENCOUNTER: ICD-10-CM

## 2019-10-06 LAB
ALBUMIN UR-MCNC: NEGATIVE MG/DL
APPEARANCE UR: CLEAR
BACTERIA #/AREA URNS HPF: ABNORMAL HPF
BILIRUB UR QL STRIP: NEGATIVE
COLOR UR AUTO: YELLOW
GLUCOSE UR STRIP-MCNC: NEGATIVE MG/DL
HGB UR QL STRIP: NEGATIVE
KETONES UR STRIP-MCNC: NEGATIVE MG/DL
LEUKOCYTE ESTERASE UR QL STRIP: ABNORMAL
MUCOUS THREADS #/AREA URNS LPF: ABNORMAL LPF
NITRATE UR QL: NEGATIVE
PH UR STRIP: 5.5 [PH] (ref 5–8)
RBC #/AREA URNS AUTO: ABNORMAL HPF
SP GR UR STRIP: >=1.03 (ref 1–1.03)
SQUAMOUS #/AREA URNS AUTO: ABNORMAL LPF
UROBILINOGEN UR STRIP-ACNC: ABNORMAL
WBC #/AREA URNS AUTO: ABNORMAL HPF

## 2019-10-06 RX ORDER — LIDOCAINE 50 MG/G
PATCH TOPICAL
Qty: 15 PATCH | Refills: 0 | Status: SHIPPED | OUTPATIENT
Start: 2019-10-06 | End: 2022-07-05

## 2019-10-07 ENCOUNTER — HOSPITAL ENCOUNTER (OUTPATIENT)
Dept: PHYSICAL MEDICINE AND REHAB | Facility: CLINIC | Age: 59
Discharge: HOME | End: 2019-10-07
Attending: FAMILY MEDICINE

## 2019-10-07 DIAGNOSIS — G56.22 ULNAR NEUROPATHY OF LEFT UPPER EXTREMITY: ICD-10-CM

## 2019-10-07 LAB — BACTERIA SPEC CULT: NORMAL

## 2019-10-08 ENCOUNTER — OFFICE VISIT - HEALTHEAST (OUTPATIENT)
Dept: FAMILY MEDICINE | Facility: CLINIC | Age: 59
End: 2019-10-08

## 2019-10-08 ENCOUNTER — RECORDS - HEALTHEAST (OUTPATIENT)
Dept: GENERAL RADIOLOGY | Facility: CLINIC | Age: 59
End: 2019-10-08
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
## 2019-10-08 DIAGNOSIS — S22.42XD CLOSED FRACTURE OF MULTIPLE RIBS OF LEFT SIDE WITH ROUTINE HEALING, SUBSEQUENT ENCOUNTER: ICD-10-CM

## 2019-10-08 DIAGNOSIS — R63.4 UNEXPLAINED WEIGHT LOSS: ICD-10-CM

## 2019-10-08 RX ORDER — TIZANIDINE HYDROCHLORIDE 2 MG/1
2 CAPSULE, GELATIN COATED ORAL 3 TIMES DAILY
Qty: 20 CAPSULE | Refills: 0 | Status: SHIPPED | OUTPATIENT
Start: 2019-10-08 | End: 2022-07-05

## 2019-10-09 ENCOUNTER — HOSPITAL ENCOUNTER (OUTPATIENT)
Dept: CT IMAGING | Facility: HOSPITAL | Age: 59
Discharge: HOME | End: 2019-10-09
Attending: FAMILY MEDICINE

## 2019-10-09 DIAGNOSIS — R63.4 UNEXPLAINED WEIGHT LOSS: ICD-10-CM

## 2019-10-11 ENCOUNTER — COMMUNICATION - HEALTHEAST (OUTPATIENT)
Dept: FAMILY MEDICINE | Facility: CLINIC | Age: 59
End: 2019-10-11

## 2019-10-15 ENCOUNTER — AMBULATORY - HEALTHEAST (OUTPATIENT)
Dept: FAMILY MEDICINE | Facility: CLINIC | Age: 59
End: 2019-10-15

## 2019-10-16 ENCOUNTER — COMMUNICATION - HEALTHEAST (OUTPATIENT)
Dept: FAMILY MEDICINE | Facility: CLINIC | Age: 59
End: 2019-10-16

## 2019-10-16 DIAGNOSIS — S22.42XA CLOSED FRACTURE OF MULTIPLE RIBS OF LEFT SIDE, INITIAL ENCOUNTER: ICD-10-CM

## 2019-10-16 DIAGNOSIS — R63.4 LOSS OF WEIGHT: ICD-10-CM

## 2019-10-23 ENCOUNTER — AMBULATORY - HEALTHEAST (OUTPATIENT)
Dept: ENDOCRINOLOGY | Facility: CLINIC | Age: 59
End: 2019-10-23

## 2019-10-23 DIAGNOSIS — M81.0 AGE-RELATED OSTEOPOROSIS WITHOUT CURRENT PATHOLOGICAL FRACTURE: ICD-10-CM

## 2019-11-07 ENCOUNTER — COMMUNICATION - HEALTHEAST (OUTPATIENT)
Dept: FAMILY MEDICINE | Facility: CLINIC | Age: 59
End: 2019-11-07

## 2019-11-19 ENCOUNTER — HOSPITAL ENCOUNTER (OUTPATIENT)
Dept: MRI IMAGING | Facility: HOSPITAL | Age: 59
Discharge: HOME | End: 2019-11-19
Attending: FAMILY MEDICINE

## 2019-11-19 DIAGNOSIS — R63.4 LOSS OF WEIGHT: ICD-10-CM

## 2019-11-22 ENCOUNTER — COMMUNICATION - HEALTHEAST (OUTPATIENT)
Dept: ENDOCRINOLOGY | Facility: CLINIC | Age: 59
End: 2019-11-22

## 2019-11-29 ENCOUNTER — COMMUNICATION - HEALTHEAST (OUTPATIENT)
Dept: FAMILY MEDICINE | Facility: CLINIC | Age: 59
End: 2019-11-29

## 2019-12-10 ENCOUNTER — HOSPITAL ENCOUNTER (OUTPATIENT)
Dept: MAMMOGRAPHY | Facility: CLINIC | Age: 59
Discharge: HOME | End: 2019-12-10
Attending: FAMILY MEDICINE

## 2019-12-10 DIAGNOSIS — Z12.31 VISIT FOR SCREENING MAMMOGRAM: ICD-10-CM

## 2019-12-17 ENCOUNTER — RECORDS - HEALTHEAST (OUTPATIENT)
Dept: GENERAL RADIOLOGY | Facility: CLINIC | Age: 59
End: 2019-12-17

## 2019-12-17 ENCOUNTER — OFFICE VISIT - HEALTHEAST (OUTPATIENT)
Dept: FAMILY MEDICINE | Facility: CLINIC | Age: 59
End: 2019-12-17

## 2019-12-17 DIAGNOSIS — S22.42XD CLOSED FRACTURE OF MULTIPLE RIBS OF LEFT SIDE WITH ROUTINE HEALING, SUBSEQUENT ENCOUNTER: ICD-10-CM

## 2019-12-17 DIAGNOSIS — S22.42XD MULTIPLE FRACTURES OF RIBS, LEFT SIDE, SUBSEQUENT ENCOUNTER FOR FRACTURE WITH ROUTINE HEALING: ICD-10-CM

## 2019-12-17 DIAGNOSIS — K64.4 EXTERNAL HEMORRHOIDS: ICD-10-CM

## 2019-12-17 DIAGNOSIS — L30.9 DERMATITIS: ICD-10-CM

## 2019-12-17 DIAGNOSIS — R63.4 LOSS OF WEIGHT: ICD-10-CM

## 2019-12-18 ENCOUNTER — COMMUNICATION - HEALTHEAST (OUTPATIENT)
Dept: FAMILY MEDICINE | Facility: CLINIC | Age: 59
End: 2019-12-18

## 2019-12-18 DIAGNOSIS — G43.009 ATYPICAL MIGRAINE: ICD-10-CM

## 2020-01-09 ENCOUNTER — COMMUNICATION - HEALTHEAST (OUTPATIENT)
Dept: FAMILY MEDICINE | Facility: CLINIC | Age: 60
End: 2020-01-09

## 2020-01-09 DIAGNOSIS — B00.1 HERPES LABIALIS: ICD-10-CM

## 2020-01-29 ENCOUNTER — COMMUNICATION - HEALTHEAST (OUTPATIENT)
Dept: ADMINISTRATIVE | Facility: CLINIC | Age: 60
End: 2020-01-29

## 2020-01-30 ENCOUNTER — AMBULATORY - HEALTHEAST (OUTPATIENT)
Dept: ENDOCRINOLOGY | Facility: CLINIC | Age: 60
End: 2020-01-30

## 2020-01-30 DIAGNOSIS — M81.0 OSTEOPOROSIS: ICD-10-CM

## 2020-01-30 DIAGNOSIS — T50.905S UNSPECIFIED ADVERSE EFFECT OF DRUG OR MEDICAMENT, SEQUELA: ICD-10-CM

## 2020-02-04 ENCOUNTER — COMMUNICATION - HEALTHEAST (OUTPATIENT)
Dept: ENDOCRINOLOGY | Facility: CLINIC | Age: 60
End: 2020-02-04

## 2020-02-17 ENCOUNTER — OFFICE VISIT - HEALTHEAST (OUTPATIENT)
Dept: OTOLARYNGOLOGY | Facility: CLINIC | Age: 60
End: 2020-02-17

## 2020-02-17 DIAGNOSIS — H61.23 BILATERAL IMPACTED CERUMEN: ICD-10-CM

## 2020-03-04 ENCOUNTER — AMBULATORY - HEALTHEAST (OUTPATIENT)
Dept: ENDOCRINOLOGY | Facility: CLINIC | Age: 60
End: 2020-03-04

## 2020-04-30 ENCOUNTER — AMBULATORY - HEALTHEAST (OUTPATIENT)
Dept: ENDOCRINOLOGY | Facility: CLINIC | Age: 60
End: 2020-04-30

## 2020-04-30 DIAGNOSIS — M81.0 OSTEOPOROSIS: ICD-10-CM

## 2020-05-11 ENCOUNTER — COMMUNICATION - HEALTHEAST (OUTPATIENT)
Dept: FAMILY MEDICINE | Facility: CLINIC | Age: 60
End: 2020-05-11

## 2020-05-11 DIAGNOSIS — R11.0 NAUSEA: ICD-10-CM

## 2020-05-12 RX ORDER — ONDANSETRON 4 MG/1
TABLET, ORALLY DISINTEGRATING ORAL
Qty: 12 TABLET | Refills: 1 | Status: SHIPPED | OUTPATIENT
Start: 2020-05-12

## 2020-05-22 ENCOUNTER — AMBULATORY - HEALTHEAST (OUTPATIENT)
Dept: LAB | Facility: CLINIC | Age: 60
End: 2020-05-22

## 2020-05-22 DIAGNOSIS — M81.0 OSTEOPOROSIS: ICD-10-CM

## 2020-05-22 LAB — CALCIUM SERPL-MCNC: 9.2 MG/DL (ref 8.5–10.5)

## 2020-05-26 LAB
25(OH)D3 SERPL-MCNC: 24.8 NG/ML (ref 30–80)
25(OH)D3 SERPL-MCNC: 24.8 NG/ML (ref 30–80)

## 2020-05-29 ENCOUNTER — OFFICE VISIT - HEALTHEAST (OUTPATIENT)
Dept: ENDOCRINOLOGY | Facility: CLINIC | Age: 60
End: 2020-05-29

## 2020-05-29 DIAGNOSIS — E55.9 VITAMIN D DEFICIENCY: ICD-10-CM

## 2020-05-29 DIAGNOSIS — M81.0 AGE-RELATED OSTEOPOROSIS WITHOUT CURRENT PATHOLOGICAL FRACTURE: ICD-10-CM

## 2020-08-05 ENCOUNTER — OFFICE VISIT - HEALTHEAST (OUTPATIENT)
Dept: OTOLARYNGOLOGY | Facility: CLINIC | Age: 60
End: 2020-08-05

## 2020-08-05 DIAGNOSIS — H61.23 EXCESSIVE CERUMEN IN BOTH EAR CANALS: ICD-10-CM

## 2020-08-05 DIAGNOSIS — M26.623 BILATERAL TEMPOROMANDIBULAR JOINT PAIN: ICD-10-CM

## 2020-08-05 DIAGNOSIS — H92.03 REFERRED OTALGIA OF BOTH EARS: ICD-10-CM

## 2020-08-12 ENCOUNTER — COMMUNICATION - HEALTHEAST (OUTPATIENT)
Dept: ADMINISTRATIVE | Facility: CLINIC | Age: 60
End: 2020-08-12

## 2020-09-14 ENCOUNTER — AMBULATORY - HEALTHEAST (OUTPATIENT)
Dept: ENDOCRINOLOGY | Facility: CLINIC | Age: 60
End: 2020-09-14

## 2020-10-17 ENCOUNTER — RECORDS - HEALTHEAST (OUTPATIENT)
Dept: LAB | Facility: CLINIC | Age: 60
End: 2020-10-17

## 2020-10-17 LAB
SARS-COV-2 PCR COMMENT: ABNORMAL
SARS-COV-2 RNA SPEC QL NAA+PROBE: POSITIVE
SARS-COV-2 VIRUS SPECIMEN SOURCE: ABNORMAL

## 2020-11-12 ENCOUNTER — AMBULATORY - HEALTHEAST (OUTPATIENT)
Dept: LAB | Facility: HOSPITAL | Age: 60
End: 2020-11-12

## 2020-11-12 DIAGNOSIS — E55.9 VITAMIN D DEFICIENCY: ICD-10-CM

## 2020-11-12 LAB
25(OH)D3 SERPL-MCNC: 30.3 NG/ML (ref 30–80)
25(OH)D3 SERPL-MCNC: 30.3 NG/ML (ref 30–80)

## 2021-01-05 ENCOUNTER — OFFICE VISIT - HEALTHEAST (OUTPATIENT)
Dept: FAMILY MEDICINE | Facility: CLINIC | Age: 61
End: 2021-01-05

## 2021-01-05 ENCOUNTER — COMMUNICATION - HEALTHEAST (OUTPATIENT)
Dept: ADMINISTRATIVE | Facility: CLINIC | Age: 61
End: 2021-01-05

## 2021-01-05 DIAGNOSIS — M25.552 PAIN IN JOINT INVOLVING PELVIC REGION AND THIGH, LEFT: ICD-10-CM

## 2021-01-05 DIAGNOSIS — M81.0 AGE-RELATED OSTEOPOROSIS WITHOUT CURRENT PATHOLOGICAL FRACTURE: ICD-10-CM

## 2021-01-05 DIAGNOSIS — S22.42XA CLOSED FRACTURE OF MULTIPLE RIBS OF LEFT SIDE, INITIAL ENCOUNTER: ICD-10-CM

## 2021-01-05 DIAGNOSIS — R53.83 FATIGUE, UNSPECIFIED TYPE: ICD-10-CM

## 2021-01-05 DIAGNOSIS — L30.9 DERMATITIS: ICD-10-CM

## 2021-01-05 DIAGNOSIS — R74.8 ELEVATED LIPASE: ICD-10-CM

## 2021-01-05 DIAGNOSIS — M54.12 CERVICAL RADICULOPATHY: ICD-10-CM

## 2021-01-05 DIAGNOSIS — B00.1 HERPES LABIALIS: ICD-10-CM

## 2021-01-05 DIAGNOSIS — E55.9 VITAMIN D DEFICIENCY: ICD-10-CM

## 2021-01-05 DIAGNOSIS — Z86.16 HISTORY OF COVID-19: ICD-10-CM

## 2021-01-05 LAB
ALBUMIN SERPL-MCNC: 3.9 G/DL (ref 3.5–5)
ALP SERPL-CCNC: 67 U/L (ref 45–120)
ALT SERPL W P-5'-P-CCNC: 20 U/L (ref 0–45)
ANION GAP SERPL CALCULATED.3IONS-SCNC: 12 MMOL/L (ref 5–18)
AST SERPL W P-5'-P-CCNC: 34 U/L (ref 0–40)
BILIRUB SERPL-MCNC: 1.1 MG/DL (ref 0–1)
BUN SERPL-MCNC: 20 MG/DL (ref 8–22)
CALCIUM SERPL-MCNC: 9.2 MG/DL (ref 8.5–10.5)
CHLORIDE BLD-SCNC: 104 MMOL/L (ref 98–107)
CO2 SERPL-SCNC: 25 MMOL/L (ref 22–31)
CREAT SERPL-MCNC: 0.75 MG/DL (ref 0.6–1.1)
ERYTHROCYTE [DISTWIDTH] IN BLOOD BY AUTOMATED COUNT: 12.9 % (ref 11–14.5)
FERRITIN SERPL-MCNC: 11 NG/ML (ref 10–130)
GFR SERPL CREATININE-BSD FRML MDRD: >60 ML/MIN/1.73M2
GLUCOSE BLD-MCNC: 84 MG/DL (ref 70–125)
HCT VFR BLD AUTO: 39.5 % (ref 35–47)
HGB BLD-MCNC: 13.2 G/DL (ref 12–16)
MCH RBC QN AUTO: 31.3 PG (ref 27–34)
MCHC RBC AUTO-ENTMCNC: 33.5 G/DL (ref 32–36)
MCV RBC AUTO: 93 FL (ref 80–100)
PLATELET # BLD AUTO: 241 THOU/UL (ref 140–440)
PMV BLD AUTO: 7.2 FL (ref 7–10)
POTASSIUM BLD-SCNC: 4.8 MMOL/L (ref 3.5–5)
PROT SERPL-MCNC: 6.6 G/DL (ref 6–8)
RBC # BLD AUTO: 4.22 MILL/UL (ref 3.8–5.4)
SODIUM SERPL-SCNC: 141 MMOL/L (ref 136–145)
TSH SERPL DL<=0.005 MIU/L-ACNC: 3.15 UIU/ML (ref 0.3–5)
WBC: 4.2 THOU/UL (ref 4–11)

## 2021-01-05 RX ORDER — SUMATRIPTAN 50 MG/1
TABLET, FILM COATED ORAL
Status: SHIPPED | COMMUNITY
Start: 2020-10-18

## 2021-01-05 RX ORDER — TRIAMCINOLONE ACETONIDE 1 MG/G
1 CREAM TOPICAL DAILY PRN
Qty: 30 G | Refills: 3 | Status: SHIPPED | OUTPATIENT
Start: 2021-01-05 | End: 2022-07-05

## 2021-01-05 RX ORDER — KETOROLAC TROMETHAMINE 10 MG/1
10 TABLET, FILM COATED ORAL EVERY 6 HOURS PRN
Qty: 30 TABLET | Refills: 0 | Status: SHIPPED | OUTPATIENT
Start: 2021-01-05 | End: 2022-07-05

## 2021-01-05 RX ORDER — CYCLOBENZAPRINE HCL 5 MG
TABLET ORAL
Qty: 30 TABLET | Refills: 0 | Status: SHIPPED | OUTPATIENT
Start: 2021-01-05 | End: 2022-07-05

## 2021-01-05 RX ORDER — ACYCLOVIR 200 MG/1
200 CAPSULE ORAL DAILY
Qty: 30 CAPSULE | Refills: 6 | Status: SHIPPED | OUTPATIENT
Start: 2021-01-05 | End: 2021-10-11

## 2021-01-05 RX ORDER — ERGOCALCIFEROL 1.25 MG/1
50000 CAPSULE ORAL
Qty: 12 CAPSULE | Refills: 1 | Status: SHIPPED | OUTPATIENT
Start: 2021-01-05 | End: 2021-10-11

## 2021-01-06 ENCOUNTER — HOSPITAL ENCOUNTER (OUTPATIENT)
Dept: RADIOLOGY | Facility: HOSPITAL | Age: 61
Discharge: HOME | End: 2021-01-06
Attending: FAMILY MEDICINE

## 2021-01-06 ENCOUNTER — COMMUNICATION - HEALTHEAST (OUTPATIENT)
Dept: FAMILY MEDICINE | Facility: CLINIC | Age: 61
End: 2021-01-06

## 2021-01-06 DIAGNOSIS — S22.42XA CLOSED FRACTURE OF MULTIPLE RIBS OF LEFT SIDE, INITIAL ENCOUNTER: ICD-10-CM

## 2021-01-06 DIAGNOSIS — M25.552 PAIN IN JOINT INVOLVING PELVIC REGION AND THIGH, LEFT: ICD-10-CM

## 2021-01-07 ENCOUNTER — COMMUNICATION - HEALTHEAST (OUTPATIENT)
Dept: FAMILY MEDICINE | Facility: CLINIC | Age: 61
End: 2021-01-07

## 2021-01-07 LAB
AMYLASE SERPL-CCNC: 158 U/L (ref 5–120)
LIPASE SERPL-CCNC: 109 U/L (ref 0–52)

## 2021-01-08 ENCOUNTER — COMMUNICATION - HEALTHEAST (OUTPATIENT)
Dept: SCHEDULING | Facility: CLINIC | Age: 61
End: 2021-01-08

## 2021-01-12 ENCOUNTER — COMMUNICATION - HEALTHEAST (OUTPATIENT)
Dept: FAMILY MEDICINE | Facility: CLINIC | Age: 61
End: 2021-01-12

## 2021-01-18 ENCOUNTER — COMMUNICATION - HEALTHEAST (OUTPATIENT)
Dept: FAMILY MEDICINE | Facility: CLINIC | Age: 61
End: 2021-01-18

## 2021-01-18 DIAGNOSIS — M81.0 AGE-RELATED OSTEOPOROSIS WITHOUT CURRENT PATHOLOGICAL FRACTURE: ICD-10-CM

## 2021-01-25 ENCOUNTER — HOSPITAL ENCOUNTER (OUTPATIENT)
Dept: MAMMOGRAPHY | Facility: CLINIC | Age: 61
Discharge: HOME | End: 2021-01-25
Attending: FAMILY MEDICINE

## 2021-01-25 DIAGNOSIS — Z12.31 VISIT FOR SCREENING MAMMOGRAM: ICD-10-CM

## 2021-01-26 ENCOUNTER — HOSPITAL ENCOUNTER (OUTPATIENT)
Dept: MRI IMAGING | Facility: HOSPITAL | Age: 61
Discharge: HOME | End: 2021-01-26
Attending: FAMILY MEDICINE

## 2021-01-26 DIAGNOSIS — M54.12 CERVICAL RADICULOPATHY: ICD-10-CM

## 2021-02-02 ENCOUNTER — COMMUNICATION - HEALTHEAST (OUTPATIENT)
Dept: ADMINISTRATIVE | Facility: CLINIC | Age: 61
End: 2021-02-02

## 2021-02-17 ENCOUNTER — OFFICE VISIT - HEALTHEAST (OUTPATIENT)
Dept: OTOLARYNGOLOGY | Facility: CLINIC | Age: 61
End: 2021-02-17

## 2021-02-17 DIAGNOSIS — H61.23 BILATERAL IMPACTED CERUMEN: ICD-10-CM

## 2021-02-17 DIAGNOSIS — J37.0 CHRONIC LARYNGITIS: ICD-10-CM

## 2021-02-17 DIAGNOSIS — R09.A2 GLOBUS SENSATION: ICD-10-CM

## 2021-02-26 ENCOUNTER — AMBULATORY - HEALTHEAST (OUTPATIENT)
Dept: ENDOCRINOLOGY | Facility: CLINIC | Age: 61
End: 2021-02-26

## 2021-02-26 DIAGNOSIS — M81.0 AGE-RELATED OSTEOPOROSIS WITHOUT CURRENT PATHOLOGICAL FRACTURE: ICD-10-CM

## 2021-02-26 DIAGNOSIS — T50.905S UNSPECIFIED ADVERSE EFFECT OF DRUG OR MEDICAMENT, SEQUELA: ICD-10-CM

## 2021-03-29 ENCOUNTER — COMMUNICATION - HEALTHEAST (OUTPATIENT)
Dept: ADMINISTRATIVE | Facility: CLINIC | Age: 61
End: 2021-03-29

## 2021-03-31 ENCOUNTER — COMMUNICATION - HEALTHEAST (OUTPATIENT)
Dept: ENDOCRINOLOGY | Facility: CLINIC | Age: 61
End: 2021-03-31

## 2021-03-31 ENCOUNTER — COMMUNICATION - HEALTHEAST (OUTPATIENT)
Dept: FAMILY MEDICINE | Facility: CLINIC | Age: 61
End: 2021-03-31

## 2021-03-31 DIAGNOSIS — Z20.822 EXPOSURE TO 2019 NOVEL CORONAVIRUS: ICD-10-CM

## 2021-04-06 ENCOUNTER — COMMUNICATION - HEALTHEAST (OUTPATIENT)
Dept: ENDOCRINOLOGY | Facility: CLINIC | Age: 61
End: 2021-04-06

## 2021-04-06 ENCOUNTER — AMBULATORY - HEALTHEAST (OUTPATIENT)
Dept: LAB | Facility: HOSPITAL | Age: 61
End: 2021-04-06

## 2021-04-06 DIAGNOSIS — Z20.822 EXPOSURE TO 2019 NOVEL CORONAVIRUS: ICD-10-CM

## 2021-04-09 ENCOUNTER — COMMUNICATION - HEALTHEAST (OUTPATIENT)
Dept: SCHEDULING | Facility: CLINIC | Age: 61
End: 2021-04-09

## 2021-04-13 ENCOUNTER — COMMUNICATION - HEALTHEAST (OUTPATIENT)
Dept: ENDOCRINOLOGY | Facility: CLINIC | Age: 61
End: 2021-04-13

## 2021-05-24 ENCOUNTER — AMBULATORY - HEALTHEAST (OUTPATIENT)
Dept: ENDOCRINOLOGY | Facility: CLINIC | Age: 61
End: 2021-05-24

## 2021-05-25 ENCOUNTER — RECORDS - HEALTHEAST (OUTPATIENT)
Dept: ADMINISTRATIVE | Facility: CLINIC | Age: 61
End: 2021-05-25

## 2021-05-26 ENCOUNTER — RECORDS - HEALTHEAST (OUTPATIENT)
Dept: ADMINISTRATIVE | Facility: CLINIC | Age: 61
End: 2021-05-26

## 2021-05-27 ENCOUNTER — RECORDS - HEALTHEAST (OUTPATIENT)
Dept: ADMINISTRATIVE | Facility: CLINIC | Age: 61
End: 2021-05-27

## 2021-05-28 ENCOUNTER — RECORDS - HEALTHEAST (OUTPATIENT)
Dept: ADMINISTRATIVE | Facility: CLINIC | Age: 61
End: 2021-05-28

## 2021-05-28 NOTE — PROGRESS NOTES
"ASSESSMENT:   1. Acute non-recurrent frontal sinusitis  Rapid Strep A Screen-Throat swab    Influenza A/B Rapid Test- Nasal Swab    Group A Strep, RNA Direct Detection, Throat    cefdinir (OMNICEF) 300 MG capsule   2. Headache  Influenza A/B Rapid Test- Nasal Swab    CANCELED: Influenza A/B Rapid Test- Nasopharyngeal Washing   3. Cough  XR Chest 2 Views    codeine-guaiFENesin (GUAIFENESIN AC)  mg/5 mL liquid       PLAN:  Rapid strep and influenza testing are negative.  Chest x-ray is without evidence of infiltrate or effusion.  Nothing on history or exam to suggest heart failure, pulmonary embolism.  History and exam most consistent with an acute sinusitis.  Patient does have a documented penicillin allergy, notes that she is traveling to [place] this week and would prefer to avoid doxycycline due to sun exposure.  Patient is prescribed a 10-day course of Omnicef, symptomatic care is advised.  We will follow-up if no improvement over the course the next several days.    I discussed red flag symptoms, return precautions to clinic/ER and follow up care with patient/parent.  Expected clinical course, symptomatic cares advised. Questions answered. Patient/parent amenable with plan.    Patient Instructions:  Patient Instructions   I believe a sinus infection is the cause of your symptomsI think this is a likely bacterial infection.  I have sent a prescription for omnicef to your pharmacy.  Take this twice daily with food. Take a probiotic such as Culturelle or Florastor while on the antibiotic or eat a Greek yogurt containing \"live active cultures\" daily.    I prescribed a cough medication containing codeine.  Do not drive while taking this medication.       For relief of your symptoms:     Use Gallo's vapor rub on your nostrils to promote air flow through your nose    Take hot steam showers/baths at least 2x per day until sinuses are cleared    Apply warm packs to the face.      Drink plenty of fluids to assist " "with clearing of secretions    Take Sudafed twice daily     Use afrin spray as prescribed for nasal congestion for the next 3 days.     Take Tylenol or Ibuprofen for pain. Do not take more than: Tylenol 1000 mg 4 times a day = 4000 mg per day. Ibuprofen 600 mg 5 times a day = 3000 mg WITH FOOD  Nasal saline rinses/sprays 1-2 times daily. Obtain nasal saline spray (Ayr or Ocean are brand names).  Get into a hot shower, and wait for the sensation of your sinuses \"opening\". Occlude one side of your nose, and use a gentle spray of saline into the opposite side of your nose.  Then blow your nose to try to mobilize the nasal secretions.    Please take your medicines as recommended above and review the discharge instructions for concerning signs/symptoms that would require your prompt return to the clinic for further evaluation. Please follow up in clinic as we have recommended below.  If your symptoms worsen prior to your follow up appointment, do not hesitate to return here to the clinic or emergency department for further evaluation.          SUBJECTIVE:   Katina Hoffman is a 59 y.o. female who presents today with 10 days of URI symptoms including congestion, runny nose, sore throat, increasing fatigue for 10 days.  Cough began several days ago, productive with green sputum.  No shortness of breath, chest pain, BAY, lower extremity swelling, hemoptysis.   Has tried OTC cold/cough meds without much relief.  Works as a RN with multiple exposures.  Did have influenza vaccine this season.      ROS:  Comprehensive 12 pt ROS completed, positives noted in HPI, otherwise negative.      Past Medical History:  Patient Active Problem List   Diagnosis     Abnormal Weight Loss     Chest Pain     Vitamin D Deficiency     Anxiety     Foot Pain (Soft Tissue)     Fatigue     Head External Swelling Occipital Left Side (___ Cm)     Abdominal Pain In The Central Upper Belly (Epigastric)     Serum Enzyme Levels - Lipase Elevated     " Dizziness - 15 seconds to 4 hoiurs - episodic - no provocatory maneuvers     Cerumen Impaction     Earache (Symptom)     Tendonitis Of The Achilles Tendon     Tendonitis     Exostosis - left anterolateral skull - no change  --->  - perceived as enlarging on 14     Right flank painv- onset about 14 - no injury     Visual field cut     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     Insomnia     Sinus bradycardia     Osteoporosis     Hypothyroid       Surgical History:  Past Surgical History:   Procedure Laterality Date     BREAST EXCISIONAL BIOPSY Right 2005    benign      SECTION             Family History:  Family History   Problem Relation Age of Onset     Breast cancer Maternal Aunt 65     Breast cancer Paternal Aunt         Unsure of age       Reviewed; Non-contributory    Social History     Tobacco Use   Smoking Status Never Smoker   Smokeless Tobacco Never Used       Current Medications:  Current Outpatient Medications on File Prior to Visit   Medication Sig Dispense Refill     aspirin 325 MG tablet Take 325 mg by mouth daily.       cholecalciferol, vitamin D3, 1,000 unit Chew Chew 1,000 Units daily. 90 tablet 1     hydrocortisone (PROCTOSOL HC) 2.5 % rectal cream Apply to the affected area twice daily as needed. 28.35 g 0     ketorolac (TORADOL) 10 mg tablet Take 1 tablet (10 mg total) by mouth every 6 (six) hours as needed. 30 tablet 0     mupirocin (BACTROBAN) 2 % ointment Apply to affected area 3 times daily 22 g 0     naproxen-diphenhydramine (ALEVE PM) 220-25 mg Tab Take 1 tablet by mouth daily as needed.       ondansetron (ZOFRAN ODT) 4 MG disintegrating tablet Take 1 tablet (4 mg total) by mouth every 8 (eight) hours as needed for nausea. 12 tablet 1     triamcinolone (KENALOG) 0.1 % cream Apply 1 application topically daily as needed. 30 g 3     sulfamethoxazole-trimethoprim (BACTRIM DS) 800-160 mg per tablet Take 1 tablet by mouth 2 (two) times a day for 7 days. 14 tablet 0      SUMAtriptan (IMITREX) 25 MG tablet Take 1 tablet (25 mg total) by mouth every 2 (two) hours as needed for migraine. Max 200 mg/24 hours 30 tablet 0     No current facility-administered medications on file prior to visit.        Allergies:   Allergies   Allergen Reactions     Penicillins Rash       OBJECTIVE:   Vitals:    05/19/19 1353   BP: 97/63   Patient Site: Left Arm   Patient Position: Sitting   Cuff Size: Adult Small   Pulse: 65   Resp: 17   Temp: 97.4  F (36.3  C)   TempSrc: Oral   SpO2: 100%   Weight: (!) 98 lb 12.8 oz (44.8 kg)     Physical exam reveals a pleasant 59 y.o. female.   General: Appears healthy, alert and cooperative. Non-toxic appearance.  Eyes:  No conjunctivitis, lids normal.   Ears:  Normal appearing auricle. External auditory meatus without excessive cerumen, edema or erythema. both TM's serous middle ear fluid and normal canals bilaterally.  No mastoid tenderness. No pain with palpation over tragus.  Nose:    Mucosa normal. Clear rhinorrhea. Frontal sinus tenderness.  Mouth:  Mucosa pink and moist.  mild erythema and postnasal drip. No trismus. No evidence of PTA. Normal phonation.  Neck: no adenopathy  Pulmonary/Chest: SARAH rhonchi. Remainder of lung fields clear. Symmetric air entry throughout both lung fields. Symmetrical chest wall movement.  Heart: regular rate and rhythm, no murmur, rub or gallop  Abdomen: soft, nontender. No masses or organomegaly  Neuro: Alert, oriented. Non-focal.  Skin: pink, warm, dry, and without lesions on limited skin exam. No rashes.  Psychiatric: Normal mood and affect.  Normal judgement and thought content. Normal behavior.       RADIOLOGY    Xr Chest 2 Views    Result Date: 5/19/2019  EXAM DATE:         05/19/2019 EXAM: X-RAY CHEST, 2 VIEWS, FRONTAL AND LATERAL LOCATION: [facility] DATE/TIME: 5/19/2019 2:00 PM INDICATION: cough, fever COMPARISON: 8/22/2018, 12/29/2017. FINDINGS: The heart size is normal. The lungs are well expanded and  air. No visible pneumothorax or pleural effusion.       LABORATORY STUDIES    Recent Results (from the past 24 hour(s))   Rapid Strep A Screen-Throat swab   Result Value Ref Range    Rapid Strep A Antigen No Group A Strep detected, presumptive negative No Group A Strep detected, presumptive negative   Influenza A/B Rapid Test- Nasal Swab   Result Value Ref Range    Influenza  A, Rapid Antigen No Influenza A antigen detected No Influenza A antigen detected    Influenza B, Rapid Antigen No Influenza B antigen detected No Influenza B antigen detected           Rebeka Avila, CNP

## 2021-05-28 NOTE — PATIENT INSTRUCTIONS - HE
"I believe a sinus infection is the cause of your symptomsI think this is a likely bacterial infection.  I have sent a prescription for omnicef to your pharmacy.  Take this twice daily with food. Take a probiotic such as Culturelle or Florastor while on the antibiotic or eat a Greek yogurt containing \"live active cultures\" daily.    I prescribed a cough medication containing codeine.  Do not drive while taking this medication.       For relief of your symptoms:     Use Gallo's vapor rub on your nostrils to promote air flow through your nose    Take hot steam showers/baths at least 2x per day until sinuses are cleared    Apply warm packs to the face.      Drink plenty of fluids to assist with clearing of secretions    Take Sudafed twice daily     Use afrin spray as prescribed for nasal congestion for the next 3 days.     Take Tylenol or Ibuprofen for pain. Do not take more than: Tylenol 1000 mg 4 times a day = 4000 mg per day. Ibuprofen 600 mg 5 times a day = 3000 mg WITH FOOD  Nasal saline rinses/sprays 1-2 times daily. Obtain nasal saline spray (Ayr or Ocean are brand names).  Get into a hot shower, and wait for the sensation of your sinuses \"opening\". Occlude one side of your nose, and use a gentle spray of saline into the opposite side of your nose.  Then blow your nose to try to mobilize the nasal secretions.    Please take your medicines as recommended above and review the discharge instructions for concerning signs/symptoms that would require your prompt return to the clinic for further evaluation. Please follow up in clinic as we have recommended below.  If your symptoms worsen prior to your follow up appointment, do not hesitate to return here to the clinic or emergency department for further evaluation.      "

## 2021-05-28 NOTE — PROGRESS NOTES
Assessment/Plan:     1. Boil  mupirocin (BACTROBAN) 2 % ointment    sulfamethoxazole-trimethoprim (BACTRIM DS) 800-160 mg per tablet   2. Dermatitis  triamcinolone (KENALOG) 0.1 % cream   3. Vaginal candidiasis  fluconazole (DIFLUCAN) 150 MG tablet   4. Nausea  ondansetron (ZOFRAN ODT) 4 MG disintegrating tablet   5. Atypical migraine  SUMAtriptan (IMITREX) 25 MG tablet       Diagnoses and all orders for this visit:    Boil  -     mupirocin (BACTROBAN) 2 % ointment; Apply to affected area 3 times daily  Dispense: 22 g; Refill: 0  -     sulfamethoxazole-trimethoprim (BACTRIM DS) 800-160 mg per tablet; Take 1 tablet by mouth 2 (two) times a day for 7 days.  Dispense: 14 tablet; Refill: 0  -Discussed with patient that she has a boil.  It has opened up on its own.  At this time recommend she apply warm compresses and topical antibiotic ointment.  Provided prescription for mupirocin.  Since she will be leaving soon for vacation, she requested to have an oral antibiotic on hand if symptoms do not seem to be improving with the topical antibiotic.  She will only use this if needed.  She was counseled on use of medication and side effects.  She does tend to get yeast infections with antibiotics so she was also provided with a prescription for fluconazole.    Dermatitis  -     triamcinolone (KENALOG) 0.1 % cream; Apply 1 application topically daily as needed.  Dispense: 30 g; Refill: 3  - Refill provided on triamcinolone cream    Vaginal candidiasis  -     fluconazole (DIFLUCAN) 150 MG tablet; Take 1 tablet (150 mg total) by mouth once for 1 dose. Repeat in 3-5 days if needed  Dispense: 2 tablet; Refill: 0    Nausea  -     ondansetron (ZOFRAN ODT) 4 MG disintegrating tablet; Take 1 tablet (4 mg total) by mouth every 8 (eight) hours as needed for nausea.  Dispense: 12 tablet; Refill: 1  -Provided prescription for ondansetron to use on an as-needed basis for nausea associated with her periodic dizzy spells.    Atypical  migraine  -     SUMAtriptan (IMITREX) 25 MG tablet; Take 1 tablet (25 mg total) by mouth every 2 (two) hours as needed for migraine. Max 200 mg/24 hours  Dispense: 30 tablet; Refill: 0  - Discussed possibility that these spells may be a migraine variant.  She is not experiencing a pounding headache but she is experiencing a lot of other symptoms typical of migraine including nausea and photosensitivity.  She shared that someone else suggested this to her as well in the past.  She would be interested in having a prescription for sumatriptan on hand to try in the future when this occurs.  Counseled on use of medication and side effects.  Also discussed possibility of repeating MRI or following up with neurologist and she will contact us if she would like to arrange those appointments.             Subjective:      Katina Hoffman is a 59 y.o. female who comes in today with concern about a lump in the right groin area.  This is been present for about a week and a half.  Seems to be getting smaller.  Today she felt like it seemed to pop and there was a little bit of drainage.  It is tender to the touch.  She has otherwise been feeling well.  She has not had fevers, chills, body aches or other symptoms of infection.  She also would like to discuss dizzy spells that she has been having.  She works as a nurse.  She has had these dizzy spells intermittently for a long time.  They will come out of the blue.  Recently had a spell while she was at work.  She felt very nauseated.  She did not vomit.  She had associated sensitivity to light.  She felt very foggy in the brain.  She had no appetite.  Did not have a headache.  Does not typically get room spinning sensation.  She does have history of cerumen impaction in her ears had previously has seen ENT specialist for removal.  She would like ears checked today.  She has not been sick with allergies, cold or sinus symptoms.  She reports that she has seen neurology for this in the  past.  She has had MRIs.  She does have a bony lump on the left side of her scalp that is monitored periodically.  Does not seem to be changing in size that she can tell.  She is wondering if she can have something to take on an as-needed basis for when these spells occur.  May be something to help with nausea.  She is planning to go to [place] soon for a weeklong trip and she wishes to avoid having a similar symptoms while she is on vacation.  We reviewed medications and allergies and updated the chart.  She has no other concerns or questions.    Current Outpatient Medications   Medication Sig Dispense Refill     aspirin 325 MG tablet Take 325 mg by mouth daily.       cholecalciferol, vitamin D3, 1,000 unit Chew Chew 1,000 Units daily. 90 tablet 1     hydrocortisone (PROCTOSOL HC) 2.5 % rectal cream Apply to the affected area twice daily as needed. 28.35 g 0     triamcinolone (KENALOG) 0.1 % cream Apply 1 application topically daily as needed. 30 g 3     ketorolac (TORADOL) 10 mg tablet Take 1 tablet (10 mg total) by mouth every 6 (six) hours as needed. 30 tablet 0     mupirocin (BACTROBAN) 2 % ointment Apply to affected area 3 times daily 22 g 0     naproxen-diphenhydramine (ALEVE PM) 220-25 mg Tab Take 1 tablet by mouth daily as needed.       ondansetron (ZOFRAN ODT) 4 MG disintegrating tablet Take 1 tablet (4 mg total) by mouth every 8 (eight) hours as needed for nausea. 12 tablet 1     sulfamethoxazole-trimethoprim (BACTRIM DS) 800-160 mg per tablet Take 1 tablet by mouth 2 (two) times a day for 7 days. 14 tablet 0     SUMAtriptan (IMITREX) 25 MG tablet Take 1 tablet (25 mg total) by mouth every 2 (two) hours as needed for migraine. Max 200 mg/24 hours 30 tablet 0     No current facility-administered medications for this visit.        Past Medical History, Family History, and Social History reviewed.  Past Medical History:   Diagnosis Date     Abnormal Pap smear of cervix 11/21/2017     Exostosis - left  anterolateral skull - no change  --->  - perceived as enlarging on 14     Past Surgical History:   Procedure Laterality Date     BREAST EXCISIONAL BIOPSY Right 2005    benign      SECTION       Penicillins  Family History   Problem Relation Age of Onset     Breast cancer Maternal Aunt 65     Breast cancer Paternal Aunt         Unsure of age     Social History     Socioeconomic History     Marital status:      Spouse name: Not on file     Number of children: Not on file     Years of education: Not on file     Highest education level: Not on file   Occupational History     Not on file   Social Needs     Financial resource strain: Not on file     Food insecurity:     Worry: Not on file     Inability: Not on file     Transportation needs:     Medical: Not on file     Non-medical: Not on file   Tobacco Use     Smoking status: Never Smoker     Smokeless tobacco: Never Used   Substance and Sexual Activity     Alcohol use: Yes     Alcohol/week: 1.2 oz     Types: 2 Glasses of wine per week     Comment: social drink      Drug use: No     Sexual activity: Not on file   Lifestyle     Physical activity:     Days per week: Not on file     Minutes per session: Not on file     Stress: Not on file   Relationships     Social connections:     Talks on phone: Not on file     Gets together: Not on file     Attends Sikh service: Not on file     Active member of club or organization: Not on file     Attends meetings of clubs or organizations: Not on file     Relationship status: Not on file     Intimate partner violence:     Fear of current or ex partner: Not on file     Emotionally abused: Not on file     Physically abused: Not on file     Forced sexual activity: Not on file   Other Topics Concern     Not on file   Social History Narrative     Not on file         Review of systems is as stated in HPI, and the remainder of the 10 system review is otherwise negative.    Objective:     Vitals:     05/13/19 1506   BP: 96/60   Patient Site: Left Arm   Patient Position: Sitting   Cuff Size: Adult Regular   Pulse: (!) 57   Temp: 97.4  F (36.3  C)   TempSrc: Oral   SpO2: 99%   Weight: (!) 98 lb 9 oz (44.7 kg)    Body mass index is 18.03 kg/m .    General appearance: alert, appears stated age and cooperative  Head: bony lump present left parietal scalp   Eyes: negative  Ears: normal TM's and external ear canals both ears  Skin: boil present right labia major, no fluctuance        This note has been dictated using voice recognition software. Any grammatical or context distortions are unintentional and inherent to the the software.

## 2021-05-29 ENCOUNTER — RECORDS - HEALTHEAST (OUTPATIENT)
Dept: ADMINISTRATIVE | Facility: CLINIC | Age: 61
End: 2021-05-29

## 2021-05-30 ENCOUNTER — RECORDS - HEALTHEAST (OUTPATIENT)
Dept: ADMINISTRATIVE | Facility: CLINIC | Age: 61
End: 2021-05-30

## 2021-05-30 NOTE — TELEPHONE ENCOUNTER
----- Message from Jailene Pablo RN sent at 7/24/2019 10:36 AM CDT -----  Regarding: FW: prolia  Pt due for prolia 8/22/19 or after Pt has an appt with Rosa 12/2019, which I would cx and have pt see a new endo, osteo provider, or bebeto at the next 6 mo f/u.  ----- Message -----  From: Jailene Pablo RN  Sent: 2/22/2019  11:53 AM  To: Jailene Pablo RN  Subject: prolia

## 2021-05-31 ENCOUNTER — RECORDS - HEALTHEAST (OUTPATIENT)
Dept: ADMINISTRATIVE | Facility: CLINIC | Age: 61
End: 2021-05-31

## 2021-05-31 VITALS — HEIGHT: 61 IN | BODY MASS INDEX: 19.63 KG/M2 | WEIGHT: 104 LBS

## 2021-05-31 VITALS — WEIGHT: 101 LBS | BODY MASS INDEX: 18.58 KG/M2 | HEIGHT: 62 IN

## 2021-05-31 VITALS — BODY MASS INDEX: 19.67 KG/M2 | HEIGHT: 61 IN | WEIGHT: 104.2 LBS

## 2021-05-31 VITALS — BODY MASS INDEX: 19.61 KG/M2 | WEIGHT: 103.8 LBS

## 2021-05-31 VITALS — BODY MASS INDEX: 18.74 KG/M2 | WEIGHT: 101.8 LBS | HEIGHT: 62 IN

## 2021-05-31 VITALS — WEIGHT: 101 LBS | BODY MASS INDEX: 18.47 KG/M2

## 2021-05-31 NOTE — PROGRESS NOTES

## 2021-06-01 ENCOUNTER — RECORDS - HEALTHEAST (OUTPATIENT)
Dept: ADMINISTRATIVE | Facility: CLINIC | Age: 61
End: 2021-06-01

## 2021-06-01 ENCOUNTER — OFFICE VISIT - HEALTHEAST (OUTPATIENT)
Dept: ENDOCRINOLOGY | Facility: CLINIC | Age: 61
End: 2021-06-01

## 2021-06-01 ENCOUNTER — RECORDS - HEALTHEAST (OUTPATIENT)
Dept: ENDOCRINOLOGY | Facility: CLINIC | Age: 61
End: 2021-06-01

## 2021-06-01 VITALS — HEIGHT: 62 IN | BODY MASS INDEX: 19.78 KG/M2 | WEIGHT: 107.5 LBS

## 2021-06-01 VITALS — WEIGHT: 105.4 LBS | BODY MASS INDEX: 19.4 KG/M2 | HEIGHT: 62 IN

## 2021-06-01 VITALS — WEIGHT: 107 LBS | BODY MASS INDEX: 19.57 KG/M2

## 2021-06-01 VITALS — BODY MASS INDEX: 19.69 KG/M2 | HEIGHT: 62 IN | WEIGHT: 107 LBS

## 2021-06-01 DIAGNOSIS — M81.0 AGE-RELATED OSTEOPOROSIS WITHOUT CURRENT PATHOLOGICAL FRACTURE: ICD-10-CM

## 2021-06-01 DIAGNOSIS — E55.9 VITAMIN D DEFICIENCY: ICD-10-CM

## 2021-06-02 VITALS — WEIGHT: 106 LBS | HEIGHT: 62 IN | BODY MASS INDEX: 19.51 KG/M2

## 2021-06-02 VITALS — BODY MASS INDEX: 18.92 KG/M2 | HEIGHT: 62 IN | WEIGHT: 102.8 LBS

## 2021-06-02 VITALS — BODY MASS INDEX: 18.68 KG/M2 | HEIGHT: 62 IN | WEIGHT: 101.5 LBS

## 2021-06-02 RX ORDER — ERGOCALCIFEROL 1.25 MG/1
50000 CAPSULE ORAL WEEKLY
Qty: 12 CAPSULE | Refills: 1 | Status: SHIPPED | OUTPATIENT
Start: 2021-06-02 | End: 2022-07-05

## 2021-06-02 NOTE — TELEPHONE ENCOUNTER
Patient Returning Call  Reason for call:  Patient calling back  Information relayed to patient:  Patient informed of medication change from Tizanidine to Flexeril.  Patient has additional questions:  No  If YES, what are your questions/concerns:  n/a  Okay to leave a detailed message?: No call back needed

## 2021-06-02 NOTE — PATIENT INSTRUCTIONS - HE
Thank you for choosing the [facility] for your EMG testing.    The ordering provider will receive your final EMG results within the next few days.  Please follow up with your provider for the results and further treatment recommendations.

## 2021-06-02 NOTE — TELEPHONE ENCOUNTER
Patient Returning Call  Reason for call:  Results  Information relayed to patient:  The writer read the following to patient per Dr Cervantes:Your CT scan is normal- it does not show a cause for you weight loss. You do have 2 tiny kidney stones and 2 calcified lymph nodes in the chest but these are not concerning.  We will now push forward with the MRI scan of the head.  I have placed an order  Patient has additional questions:  Yes  If YES, what are your questions/concerns:  No order noted for MRI please place.  Patient in agreement with plan.    Okay to leave a detailed message?: Yes [phone]

## 2021-06-02 NOTE — PATIENT INSTRUCTIONS - HE
Ice to the painful areas every couple hours  Practice deep breathing to expand lungs  Toradol 10mg every 6 hours for pain as needed  May take tylenol in between  Flexeril 5-10mg every 8 hours for muscle spasm as needed  Lidocaine patch on 12 hours off 12 hours  Lumbar support as needed  zofran if needed for nausea  No work for 3 days then see the primary clinic for more restrictions and to reevaluate  activity as tolerated - limit arm motion on the left side

## 2021-06-02 NOTE — TELEPHONE ENCOUNTER
----- Message from Mackenzie Gardner CMA sent at 10/10/2019  8:31 AM CDT -----    ----- Message -----  From: Padmini Cervantes MD  Sent: 10/9/2019   7:24 PM CDT  To: Padmini Cervantes Care Team Pool    Your CT scan is normal- it does not show a cause for you weight loss. You do have 2 tiny kidney stones and 2 calcified lymph nodes in the chest but these are not concerning.  We will now push forward with the MRI scan of the head.  I have placed an order

## 2021-06-02 NOTE — PROGRESS NOTES
ASSESSMENT/PLAN:  1. Unintentional weight loss  58 yo female with over 10 lbs of unintentional weight loss.  No other identifying symptoms. She feels she is eating more and better than usual. She is however very active.  Will start workup for precipitating cause.  Recommended keeping food diary, adding supplements of boost or ensure if needed and follow up in 2 weeks.  - HM1(CBC and Differential)  - Comprehensive Metabolic Panel  - Magnesium  - Lipase  - Amylase  - Thyroid Stimulating Hormone (TSH)  - T3 (Triiodothyronine), Free  - T3, Total  - T4, Free  - Vitamin B12  - Vitamin D, Total (25-Hydroxy)  - Syphilis Screen, [place]  - HIV Antigen/Antibody Screening [place]  - Hepatitis C Antibody (Anti-HCV)  - HM1 (CBC with Diff)    2. Visual field cut  MRI of brain and follow up with ophthalmology    3. Ulnar neuropathy of left upper extremity  - EMG- Left Arm; Future    4. Plantar fasciitis    5. Screen for STD (sexually transmitted disease)  - Chlamydia trachomatis & Neisseria gonorrhoeae, Amplified Detection      Dragon dictation was used for this note.  Speech recognition errors are a possibility.    No follow-ups on file.  There are no Patient Instructions on file for this visit.    Orders Placed This Encounter   Procedures     EMG- Left Arm     Left Arm     Standing Status:   Future     Number of Occurrences:   1     Standing Expiration Date:   9/30/2020     Chlamydia trachomatis & Neisseria gonorrhoeae, Amplified Detection     Order Specific Question:   Specimen Source?     Answer:   Urine     Comprehensive Metabolic Panel     Magnesium     Lipase     Amylase     Thyroid Stimulating Hormone (TSH)     T3 (Triiodothyronine), Free     T3, Total     T4, Free     Vitamin B12     Vitamin D, Total (25-Hydroxy)     Syphilis Screen, [place]     HIV Antigen/Antibody Screening [place]     Hepatitis C Antibody (Anti-HCV)     HM1 (CBC with Diff)     Medications Discontinued During This Encounter   Medication Reason      codeine-guaiFENesin (GUAIFENESIN AC)  mg/5 mL liquid Therapy completed         CHIEF COMPLAINT;  Chief Complaint   Patient presents with     Tingling       HISTORY OF PRESENT ILLNESS:  Katina is a 59 y.o. female presenting to the clinic today for several concerns. She has been having pain in her feet. It is locate at the heel and just under the foot. Worse with walking.  She is also have tingling and some pain into her lower arm and fingers on the left. She has not had injury, no neck or shoulder pain and no history of similar symptoms.  She occasionally sleeps on this side.  Finally she has had significant weight loss and people are commenting to her on the weight loss. She states she feels she is eating more than usual, more protein and is working out quite a bit.  She is in a new relationship and they are biking together.  She is not trying to lose weight.  No night sweats or fevers.  She is not experiencing chest pain, breathing diffiulties, abdominal pain or blood in stools. No new rashes or illnesses.    Remainder of 12-point ROS is negative.    TOBACCO USE:  Social History     Tobacco Use   Smoking Status Never Smoker   Smokeless Tobacco Never Used       VITALS:  Vitals:    09/30/19 1631   BP: 104/72   Patient Site: Left Arm   Patient Position: Sitting   Cuff Size: Adult Regular   Pulse: (!) 48   SpO2: 95%   Weight: (!) 91 lb 11.2 oz (41.6 kg)     Wt Readings from Last 3 Encounters:   10/08/19 (!) 88 lb 4.8 oz (40.1 kg)   10/06/19 (!) 91 lb 11.2 oz (41.6 kg)   09/30/19 (!) 91 lb 11.2 oz (41.6 kg)     Body mass index is 16.77 kg/m .    PHYSICAL EXAM:  GENERAL APPEARANCE: Alert, cooperative, no distress, appears stated age  HEAD: Normocephalic, without obvious abnormality, atraumatic  EYES:  PERRL, conjunctiva/corneas clear, EOM's intact, fundi     benign, both eyes       EARS: Normal TM's and external ear canals, both ears  NOSE:  Nares normal, septum midline, mucosa normal, no drainage or sinus  tenderness  THROAT: Lips, mucosa, and tongue normal; teeth and gums normal  NECK: Supple, symmetrical, trachea midline, no adenopathy;    thyroid:  No enlargement/tenderness/nodules; no carotid    bruit or JVD  BACK: Symmetric, no curvature, ROM normal, no CVA tenderness  LUNGS: Clear to auscultation bilaterally, respirations unlabored  CHEST WALL: No tenderness or deformity  HEART: Regular rate and rhythm, S1 and S2 normal, no murmur, rub or gallop  ABDOMEN: Soft, non-tender, bowel sounds active all four quadrants,     no masses, no organomegaly  EXTREMITIES: Extremities normal, atraumatic, no cyanosis or edema  PULSES: 2+ and symmetric all extremities  SKIN: Skin color, texture, turgor normal, no rashes or lesions  LYMPH NODES: Cervical, supraclavicular, and axillary nodes normal  NEUROLOGIC: CNII-XII intact. Normal strength, sensation and reflexes       throughout    RECENT RESULTS  No results found for this or any previous visit (from the past 48 hour(s)).    MEDICATIONS:  Current Outpatient Medications   Medication Sig Dispense Refill     aspirin 325 MG tablet Take 325 mg by mouth daily.       cholecalciferol, vitamin D3, 1,000 unit Chew Chew 1,000 Units daily. 90 tablet 1     hydrocortisone (PROCTOSOL HC) 2.5 % rectal cream Apply to the affected area twice daily as needed. 28.35 g 0     naproxen-diphenhydramine (ALEVE PM) 220-25 mg Tab Take 1 tablet by mouth daily as needed.       ondansetron (ZOFRAN ODT) 4 MG disintegrating tablet Take 1 tablet (4 mg total) by mouth every 8 (eight) hours as needed for nausea. 12 tablet 1     SUMAtriptan (IMITREX) 25 MG tablet Take 1 tablet (25 mg total) by mouth every 2 (two) hours as needed for migraine. Max 200 mg/24 hours 30 tablet 0     triamcinolone (KENALOG) 0.1 % cream Apply 1 application topically daily as needed. 30 g 3     cyclobenzaprine (FLEXERIL) 5 MG tablet 5-10mg every 8 hours as needed for muscle spasm 30 tablet 0     ketorolac (TORADOL) 10 mg tablet Take 1  tablet (10 mg total) by mouth every 6 (six) hours as needed. 30 tablet 0     lidocaine (LIDODERM) 5 % 12 hours on and 12 hours off 15 patch 0     ondansetron (ZOFRAN) 4 MG tablet Take 1 tablet (4 mg total) by mouth every 8 (eight) hours as needed for nausea. 20 tablet 1     oxyCODONE (ROXICODONE) 5 MG immediate release tablet 1/2 tab po every 8 hours as needed for pain 12 tablet 0     TiZANidine (ZANAFLEX) 2 MG capsule Take 1 capsule (2 mg total) by mouth 3 (three) times a day. 20 capsule 0     Current Facility-Administered Medications   Medication Dose Route Frequency Provider Last Rate Last Dose     denosumab 60 mg (PROLIA 60 mg/ml)  60 mg Subcutaneous Q6 Months Katelyn Macias NP   60 mg at 08/28/19 4345

## 2021-06-02 NOTE — PATIENT INSTRUCTIONS - HE
Multiple left rib fractures:     One time prescription given for #12 of oxyCODONE (ROXICODONE) 5 MG immediate release tablet; 1/2 tab by mouth every 8 hours as needed for pain.    Switched from Flexeril to TiZANidine (ZANAFLEX) 2 MG capsule; Take 1 capsule (2 mg total) by mouth 3 (three) times a day.    Recommended trying local heat application.    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    Tailbone pain following fall:     X-rays of sacrum and coccyx ordered. You will be notified of results once available.    Unexplained weight loss:    CT of chest, abdomen, and pelvis ordered.    If unremarkable, may consider MRI workup.

## 2021-06-02 NOTE — PROGRESS NOTES
Patient presents at the request of Dr. Cervantes for left upper extremity EMG.  She has at least a 1 month history of left hand numbness and tingling digit 4 and 5.  She also has some pain in the left digit 1.  She is right-handed.  Some neck pain.    EMG/NCS  results: Please see scanned full report    Comment NCS: Normal study  1.  Normal nerve conduction studies left upper extremity.    Comment EMG: Normal study  1.  Normal needle EMG left upper extremity.    Interpretation: Normal study    1. There is no electrodiagnostic evidence of cervical radiculopathy, brachial plexopathy, or focal neuropathy in the left upper extremity.  Specifically, there is no electrodiagnostic evidence of ulnar neuropathy in the left upper extremity.    The testing was completed in its entirety by the physician.       It was our pleasure caring for your patient today, if there any questions or concerns please do not hesitate to contact us.

## 2021-06-02 NOTE — TELEPHONE ENCOUNTER
Medication Request  Medication name: Tizanidine  Pharmacy Name and Location:  [facility]  Reason for request: The patient is not getting good relief with the tizanidine.  Patient is asking to go back on the Flexeril at 10 mg three times a day as needed for rib pain.  Please advise.    When did you use medication last?:  10/7/19  Patient offered appointment:  no  Okay to leave a detailed message: yes [phone]

## 2021-06-02 NOTE — PROGRESS NOTES
Assessment/Plan:   Fall in home, initial encounter  Rib pain on left side  Flank pain  Injury of left thumb, initial encounter  Right elbow contusion  Closed fracture of multiple ribs of left side, initial encounter  Fall early this morning in bathroom at home, striking the side of the tub with left side chest. Xray confirms three minimally displaced rib fractures, 6,7,10. There is also sprain of the left MCP but no fracture and there is contusion of the medial right arm near the elbow but no sign of bony injury. No blood in the urine and abdomen is soft at this time. Possible UTI sxs last week and ongoing nocturia which led to this fall. UA with some bacteria and white cells. We will obtain culture. No head injury. She requests oral toradol for pain as this as worked for her for pain in the past. We will add flexeril for the muscle spasm that will likely occur. zofran for the associated nausea and trial of lidocaine patches may be used. She requested and we provided a lumbar support brace to provide some comfort for the rib fractures. We discussed at length the need for taking deep breaths in through the day, have periods of no brace on etc to prevent pneumonia. Apply ice for 3 days then may alternate with heat. Recheck scheduled in 3 days, no work until then.   I discussed red flag symptoms, return precautions to clinic/ER and follow up care with patient/parent.  Expected clinical course, symptomatic cares advised. Questions answered. Patient/parent amenable with plan.  - XR Finger Left 2 or More VWS  - XR Ribs Left W PA Chest  - Urinalysis-UC if Indicated  - Culture, Urine  - cyclobenzaprine (FLEXERIL) 5 MG tablet; 5-10mg every 8 hours as needed for muscle spasm  Dispense: 30 tablet; Refill: 0  - ketorolac (TORADOL) 10 mg tablet; Take 1 tablet (10 mg total) by mouth every 6 (six) hours as needed.  Dispense: 30 tablet; Refill: 0  - lidocaine (LIDODERM) 5 %; 12 hours on and 12 hours off  Dispense: 15 patch; Refill:  0  - ondansetron (ZOFRAN) 4 MG tablet; Take 1 tablet (4 mg total) by mouth every 8 (eight) hours as needed for nausea.  Dispense: 20 tablet; Refill: 1  - Support lumbosacral    Ice to the painful areas every couple hours  Practice deep breathing to expand lungs  Toradol 10mg every 6 hours for pain as needed  May take tylenol in between  Flexeril 5-10mg every 8 hours for muscle spasm as needed  Lidocaine patch on 12 hours off 12 hours  Lumbar support as needed  zofran if needed for nausea  No work for 3 days then see the primary clinic for more restrictions and to reevaluate - appointment made for this week.   activity as tolerated - limit arm motion on the left side  Urine culture is pending - we will treat if there is infection.     Subjective:      Katina Hoffman is a 59 y.o. female who presents for evaluation of pain on the left side of her body after a fall. She was up to use the bathroom at 0400 this morning when she tripped and fell, landing on her left side. Her left side chest struck the side of the tub on the way down. She has had immediate pain in the left chest/side/midback and flank region where she hit the tub. She also has a painful right elbow and left thumb. She did not hit her head and there was no LOC. She has not been dizzy before or since the fall. No palpitations or CP. No current URI, ST, cough or shortness of breath. She does have pain with deep breathing since the fall. No abdominal pain, no V/D. Mild nausea now may be related to the pain and shock. There has been no blood in her urine. She thought she might have had a UTI last week with some increased frequency and dysuria but the symptoms improved other than some persistent nocturia which is not typical for her. She has no breaks in the skin but some bruises are showing up. No hip, knee, ankle, neck or shoulder pains. 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   Non-smoker.     Allergies   Allergen Reactions      Penicillins Rash     Current Outpatient Medications on File Prior to Visit   Medication Sig Dispense Refill     aspirin 325 MG tablet Take 325 mg by mouth daily.       cholecalciferol, vitamin D3, 1,000 unit Chew Chew 1,000 Units daily. 90 tablet 1     hydrocortisone (PROCTOSOL HC) 2.5 % rectal cream Apply to the affected area twice daily as needed. 28.35 g 0     naproxen-diphenhydramine (ALEVE PM) 220-25 mg Tab Take 1 tablet by mouth daily as needed.       ondansetron (ZOFRAN ODT) 4 MG disintegrating tablet Take 1 tablet (4 mg total) by mouth every 8 (eight) hours as needed for nausea. 12 tablet 1     SUMAtriptan (IMITREX) 25 MG tablet Take 1 tablet (25 mg total) by mouth every 2 (two) hours as needed for migraine. Max 200 mg/24 hours 30 tablet 0     triamcinolone (KENALOG) 0.1 % cream Apply 1 application topically daily as needed. 30 g 3     Current Facility-Administered Medications on File Prior to Visit   Medication Dose Route Frequency Provider Last Rate Last Dose     denosumab 60 mg (PROLIA 60 mg/ml)  60 mg Subcutaneous Q6 Months Katelyn Macias NP   60 mg at 08/28/19 1237     Patient Active Problem List   Diagnosis     Abnormal Weight Loss     Chest Pain     Vitamin D Deficiency     Anxiety     Foot Pain (Soft Tissue)     Fatigue     Head External Swelling Occipital Left Side (___ Cm)     Abdominal Pain In The Central Upper Belly (Epigastric)     Serum Enzyme Levels - Lipase Elevated     Dizziness - 15 seconds to 4 hoiurs - episodic - no provocatory maneuvers     Cerumen Impaction     Earache (Symptom)     Tendonitis Of The Achilles Tendon     Tendonitis     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 AVSS  Head: Normocephalic, without obvious abnormality, atraumatic  Eyes: Conjunctivae are normal. Extraocular movements are intact. PERRLA  Nose: No significant congestion.  Throat: lips pink and moist  Neck/back: No adenopathy; no pain with range of motion. No pain with percussion of the spine all the way down.   Lungs: Clear to auscultation bilaterally, equal breath sounds, some pain with inspiration. No visible chest wall deformity other than bruise and swelling posterolateral chest at site of impact. Tender to palpate the left side, right side nontender  Heart: Regular rate and rhythm  Abdomen: Soft, non-distended, tender near the left flank and LUQ along the rib margins. No guarding of abdomen itself.   Extremities: No lower extremity edema. The right elbow has a bruise with swelling and tenderness medially. No olecranon pain. Able to flex and extend at the elbow without pain. Wrist pulses intact bilaterally and cap refill normal throughout. The left thumb is bruised and swollen at the MCP, there is pain with motion of the thumb. Remaining BLE, shoulders and right hand are without pain or apparent injury.   Skin: Skin color, texture, turgor normal, no rashes. Contusion as described above.   Psychiatric: Patient has a normal mood and affect, still a bit shaken up and anxious, clearly in pain.        Xray of the left thumb and the left ribs with a PA chest were obtained and viewed by me. The thumb had no fracture. CXR with no effusions or pneumothorax. suspect rib fractures and these were confirmed by radiology. Minimally displaced anterolateral ribs 6,7,10.    Xr Ribs Left W Pa Chest  Result Date: 10/6/2019  EXAM DATE:         10/06/2019 EXAM: X-RAY RIBS, UNILATERAL PLUS PA CHEST LOCATION: [facility] DATE/TIME: 10/6/2019 10:15 AM INDICATION: fell onto edge of tub striking left lateral chest with pain COMPARISON: 5/19/2019 IMPRESSION: No pneumothorax. No focal consolidation. Normal heart size. Minimally displaced anterolateral left sixth, seventh and 10th rib fractures.     Xr Finger Left 2 Or More Vws  Result Date: 10/6/2019  EXAM DATE:         10/06/2019 EXAM: X-RAY EXAM OF FINGER(S),LEFT,MINIMUM TWO VIEWS LOCATION: [facility] DATE/TIME: 10/6/2019 10:00 AM INDICATION: fell at home, now bruising and pain thumb MCP COMPARISON: None. IMPRESSION: No fracture or dislocation. Mild soft tissue swelling about the first carpometacarpal joint.     Recent Results (from the past 24 hour(s))   Urinalysis-UC if Indicated   Result Value Ref Range    Color, UA Yellow Colorless, Yellow, Straw, Light Yellow    Clarity, UA Clear Clear    Glucose, UA Negative Negative    Bilirubin, UA Negative Negative    Ketones, UA Negative Negative    Specific Gravity, UA >=1.030 1.005 - 1.030    Blood, UA Negative Negative    pH, UA 5.5 5.0 - 8.0    Protein, UA Negative Negative mg/dL    Urobilinogen, UA 0.2 E.U./dL 0.2 E.U./dL, 1.0 E.U./dL    Nitrite, UA Negative Negative    Leukocytes, UA Trace (!) Negative    Bacteria, UA Few (!) None Seen hpf    RBC, UA 0-2 None Seen, 0-2 hpf    WBC, UA 5-10 (!) None Seen, 0-5 hpf    Squam Epithel, UA 0-5 None Seen, 0-5 lpf    Mucus, UA Many (!) None Seen lpf

## 2021-06-02 NOTE — TELEPHONE ENCOUNTER
I called pt and no answer. Left voice message. If pt returns call, please relay result message below.

## 2021-06-02 NOTE — TELEPHONE ENCOUNTER
Patient Returning Call  Reason for call:  Patient calling back  Information relayed to patient:  Patient informed the MRI was ordered, she should remove her Lidocaine patch prior to MRI if using.  Patient has additional questions:  No  If YES, what are your questions/concerns:  n/a  Okay to leave a detailed message?: No call back needed

## 2021-06-02 NOTE — PROGRESS NOTES
ASSESSMENT/PLAN:  1. Closed fracture of multiple ribs of left side with routine healing, subsequent encounter  59-year-old female with 3 rib fractures anterior lateral on the left after a fall.  They are mildly displaced.  We discussed using warm packs on the rib cage, we discussed using an Ace bandage for support.  She will use tizanidine and Aleve for primary pain relief.  She can use some occasional oxycodone half tablet every 8 hours as needed for more severe pain.  Would expect the course of healing to be over the next 1 to 2 months.  If she experiences any abrupt onset of shortness of breath she needs to be seen immediately for further evaluation.  - TiZANidine (ZANAFLEX) 2 MG capsule; Take 1 capsule (2 mg total) by mouth 3 (three) times a day.  Dispense: 20 capsule; Refill: 0  - oxyCODONE (ROXICODONE) 5 MG immediate release tablet; 1/2 tab po every 8 hours as needed for pain  Dispense: 12 tablet; Refill: 0    2. Fall, subsequent encounter  She is experiencing some sacral and coccyx pain after the fall.  She has an obvious bruise in this region.  X-rays are currently pending for further evaluation.  - XR Sacrum and Coccyx 2 or More VWS; Future    3. Unexplained weight loss  Prior to the fall, we were doing an evaluation for unexplained weight loss.  Her labs showed only elevations in amylase and lipase which have been more chronic for her.  We we will plan on ordering a CT scan of the abdomen and pelvis as well as a chest CT due to the severity of the  Weight loss.  Further recommendations pending the scans.  - CT Abdomen Pelvis With Oral With IV Contrast; Future  - CT Chest With Contrast; Future      All medications have been reviewed and reconciled.    No follow-ups on file.    Patient Instructions   Multiple left rib fractures:     One time prescription given for #12 of oxyCODONE (ROXICODONE) 5 MG immediate release tablet; 1/2 tab by mouth every 8 hours as needed for pain.    Switched from Flexeril to  TiZANidine (ZANAFLEX) 2 MG capsule; Take 1 capsule (2 mg total) by mouth 3 (three) times a day.    Recommended trying local heat application.    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    Tailbone pain following fall:     X-rays of sacrum and coccyx ordered. You will be notified of results once available.    Unexplained weight loss:    CT of chest, abdomen, and pelvis ordered.    If unremarkable, may consider MRI workup.      Orders Placed This Encounter   Procedures     XR Sacrum and Coccyx 2 or More VWS     Standing Status:   Future     Number of Occurrences:   1     Standing Expiration Date:   10/8/2020     Order Specific Question:   Reason for Exam (Describe Symptoms):     Answer:   sacral pain s/p fall     Order Specific Question:   Is the patient pregnant?     Answer:   No     Order Specific Question:   Can the procedure be changed per Radiologist protocol?     Answer:   Yes     CT Abdomen Pelvis With Oral With IV Contrast     Standing Status:   Future     Standing Expiration Date:   10/8/2020     Order Specific Question:   Reason for Exam (Describe Symptoms):     Answer:   unexplained weight loss with elevated lipase     Order Specific Question:   Is the patient pregnant?     Answer:   No     Order Specific Question:   Can the procedure be changed per Radiologist protocol?     Answer:   Yes     Order Specific Question:   If this is a diagnostic procedure, have the patient's age and recent imaging history been considered?     Answer:   Yes     CT Chest With Contrast     Standing Status:   Future     Standing Expiration Date:   10/8/2020     Order Specific Question:   Reason for Exam (Describe Symptoms):     Answer:   unexplained weight loss     Order Specific Question:   Is the patient pregnant?     Answer:   No     Order Specific Question:   Can the procedure be changed per Radiologist protocol?     Answer:   Yes     Order Specific Question:   If this is a diagnostic procedure, have  the patient's age and recent imaging history been considered?     Answer:   Yes     There are no discontinued medications.      CHIEF COMPLAINT;  Chief Complaint   Patient presents with     Hospital Visit Follow Up     Test Results       HISTORY OF PRESENT ILLNESS:  Katina is a 59 y.o. female presenting to the clinic today for follow up on fall. She was seen at the [facility] on 10/06/2019 for a fall in her bathroom with striking her left lateral chest on the edge of the tub and injuring her left thumb in the process. X-rays of her ribs showed minimally displaced anterolateral left sixth, seventh and 10th rib fractures. X-rays of her left hand were unremarkable. At this point, the patient is still having significant left-sided rib pain that she describes as sharp and shooting pain. Her ribs sometimes feel like they are shifting. Her tailbone has also been very sore. Her pain is limiting her activities, and she took off of work the past two days. Katina is able to breathe without shortness of breath though coughing and blowing her nose aggravate her rib pain. She is icing and using ACE bandages. The patient was given started on Toradol, Flexeril, and lidocaine patches. She increased her Flexeril to 10 mg as 5 mg did nothing to help her pain. The lidocaine patches have also not given much pain relief.    Katina would like to review her UA from 10/06/2019. This showed a few leukocytes and bacteria.    She would also like to review her lab results from 09/30/2019 which were done due to unintentional weight loss. Her total bilirubin was mildly elevated. Her amylase and lipase were persistently elevated.    Her mood has been a bit down with her recent fall and unexplained weight loss.    REVIEW OF SYSTEMS:  General: Unintentional weight loss.  Respiratory: No shortness of breath.  Musculoskeletal: Tailbone pain. Left-sided rib pain.   Psychiatric: A bit of depressed mood.  All other systems are  negative.    PFSH:  She took off of work the past two days. Reviewed, as below.    TOBACCO USE:  Social History     Tobacco Use   Smoking Status Never Smoker   Smokeless Tobacco Never Used       VITALS:  Vitals:    10/08/19 1446 10/08/19 1448   BP: 100/60    Patient Site: Left Arm    Patient Position: Sitting    Cuff Size: Adult Regular    Pulse: (!) 51 (!) 57   SpO2: (!) 87% 98%   Weight: (!) 88 lb 4.8 oz (40.1 kg)      Wt Readings from Last 3 Encounters:   10/08/19 (!) 88 lb 4.8 oz (40.1 kg)   10/06/19 (!) 91 lb 11.2 oz (41.6 kg)   09/30/19 (!) 91 lb 11.2 oz (41.6 kg)     Body mass index is 16.15 kg/m .    PHYSICAL EXAM:  GENERAL APPEARANCE: Alert, cooperative, no distress, appears stated age  HEAD: Normocephalic, without obvious abnormality, atraumatic, moist mucous membranes   EYES:  PERRL, conjunctiva/corneas clear, EOM's intact  LUNGS: Clear to auscultation bilaterally, respirations unlabored  CHEST WALL: Tenderness over the anterolateral left 6-10th ribs  HEART: Regular rate and rhythm, S1 and S2 normal, no murmur, rub or gallop  EXTREMITIES: Extremities normal, atraumatic, no cyanosis or edema  SKIN: Skin color, texture, turgor normal, no rashes or lesions on exposed skin.  NEUROLOGIC: CNII-XII intact. Normal strength and sensation throughout.    RECENT RESULTS  No results found for this or any previous visit (from the past 48 hour(s)).      ADDITIONAL HISTORY SUMMARIZED (2): Reviewed 10/06/2019 [facility] in clinic note regarding fall.  DECISION TO OBTAIN EXTRA INFORMATION (1): None.  RADIOLOGY TESTS (1): 10/06/2019 X-rays of her ribs showed minimally displaced anterolateral left sixth, seventh and 10th rib fractures. X-rays of her left hand the same day were unremarkable. CT chest, abdomen, pelvis ordered. X-rays of sacrum and coccyx ordered.  LABS (1): 09/30/2019 labs reviewed, significant for persistently elevated amylase and lipase. 10/06/2019 UA reviewed and unremarkable.  MEDICINE TESTS (1):  None.  INDEPENDENT REVIEW (2 each): None.    The visit lasted a total of 21 minutes face to face with the patient. Over 50% of the time was spent counseling and educating the patient about rib fractures, tailbone pain, pain medication management, and unintentional weight loss.    I, Sherri Berry, am scribing for and in the presence of, Dr. Cervantes.    I, Dr. Cervantes, personally performed the services described in this documentation, as scribed by Sherri Berry in my presence, and it is both accurate and complete.    Dragon dictation was used for this note.  Speech recognition errors are a possibility.    MEDICATIONS:  Current Outpatient Medications   Medication Sig Dispense Refill     aspirin 325 MG tablet Take 325 mg by mouth daily.       cholecalciferol, vitamin D3, 1,000 unit Chew Chew 1,000 Units daily. 90 tablet 1     cyclobenzaprine (FLEXERIL) 5 MG tablet 5-10mg every 8 hours as needed for muscle spasm 30 tablet 0     hydrocortisone (PROCTOSOL HC) 2.5 % rectal cream Apply to the affected area twice daily as needed. 28.35 g 0     ketorolac (TORADOL) 10 mg tablet Take 1 tablet (10 mg total) by mouth every 6 (six) hours as needed. 30 tablet 0     lidocaine (LIDODERM) 5 % 12 hours on and 12 hours off 15 patch 0     naproxen-diphenhydramine (ALEVE PM) 220-25 mg Tab Take 1 tablet by mouth daily as needed.       ondansetron (ZOFRAN ODT) 4 MG disintegrating tablet Take 1 tablet (4 mg total) by mouth every 8 (eight) hours as needed for nausea. 12 tablet 1     ondansetron (ZOFRAN) 4 MG tablet Take 1 tablet (4 mg total) by mouth every 8 (eight) hours as needed for nausea. 20 tablet 1     SUMAtriptan (IMITREX) 25 MG tablet Take 1 tablet (25 mg total) by mouth every 2 (two) hours as needed for migraine. Max 200 mg/24 hours 30 tablet 0     triamcinolone (KENALOG) 0.1 % cream Apply 1 application topically daily as needed. 30 g 3     oxyCODONE (ROXICODONE) 5 MG immediate release tablet 1/2 tab po every 8 hours as needed for  pain 12 tablet 0     TiZANidine (ZANAFLEX) 2 MG capsule Take 1 capsule (2 mg total) by mouth 3 (three) times a day. 20 capsule 0     Current Facility-Administered Medications   Medication Dose Route Frequency Provider Last Rate Last Dose     denosumab 60 mg (PROLIA 60 mg/ml)  60 mg Subcutaneous Q6 Months Katelyn Macias NP   60 mg at 08/28/19 1237       Total data points: 4

## 2021-06-03 VITALS
WEIGHT: 88.3 LBS | BODY MASS INDEX: 16.15 KG/M2 | SYSTOLIC BLOOD PRESSURE: 100 MMHG | DIASTOLIC BLOOD PRESSURE: 60 MMHG | OXYGEN SATURATION: 98 % | HEART RATE: 57 BPM

## 2021-06-03 VITALS
DIASTOLIC BLOOD PRESSURE: 72 MMHG | SYSTOLIC BLOOD PRESSURE: 104 MMHG | BODY MASS INDEX: 16.77 KG/M2 | OXYGEN SATURATION: 95 % | HEART RATE: 48 BPM | WEIGHT: 91.7 LBS

## 2021-06-03 VITALS — BODY MASS INDEX: 18.07 KG/M2 | WEIGHT: 98.8 LBS

## 2021-06-03 VITALS
SYSTOLIC BLOOD PRESSURE: 136 MMHG | HEART RATE: 46 BPM | WEIGHT: 91.7 LBS | BODY MASS INDEX: 16.77 KG/M2 | RESPIRATION RATE: 12 BRPM | OXYGEN SATURATION: 100 % | TEMPERATURE: 97.3 F | DIASTOLIC BLOOD PRESSURE: 79 MMHG

## 2021-06-03 VITALS — WEIGHT: 98.56 LBS | BODY MASS INDEX: 18.03 KG/M2

## 2021-06-03 NOTE — TELEPHONE ENCOUNTER
Reason contacted:  Results   Information relayed:  Below message.    Patient states she needs to be seen at 4 pm due to her work schedule.  I was unable to find any appointments times at 4 pm within the next 2 weeks.    Please advise    Additional questions:  No  Further follow-up needed:  Yes  Okay to leave a detailed message:  Yes

## 2021-06-03 NOTE — TELEPHONE ENCOUNTER
----- Message from Padmini Cervantes MD sent at 11/27/2019  5:56 PM CST -----  Your MRI does not show a cause for your current symptoms.  We need to further address your weight loss.  Please schedule to see me in the next 2 weeks  ## ok to double her into my schedule

## 2021-06-03 NOTE — TELEPHONE ENCOUNTER
Left message to call back for: Patient.   Information to relay to patient:  Please help schedule patient for 12/17 at 4:20pm with Dr. Cervantes. OK to double book per Dr. Cervantes. Thank you!

## 2021-06-04 VITALS
WEIGHT: 89.8 LBS | SYSTOLIC BLOOD PRESSURE: 110 MMHG | DIASTOLIC BLOOD PRESSURE: 67 MMHG | HEART RATE: 47 BPM | BODY MASS INDEX: 16.42 KG/M2

## 2021-06-04 NOTE — TELEPHONE ENCOUNTER
Patient Returning Call  Reason for call:  Return call   Information relayed to patient:  Caller was informed of message from Padmini Cervantes MD ; Can you please let pt know I increased the dose of her sumatriptan. Also that her chest xray shows that the rib fractures have not completely healed however do show signs of healing and callus- Still use caution with twisting exercises and upper extremity exercises  Patient has additional questions:  No  If YES, what are your questions/concerns:  n/a  Okay to leave a detailed message?: No call back needed

## 2021-06-04 NOTE — PROGRESS NOTES
ASSESSMENT/PLAN:  1. Abnormal Weight Loss  59-year-old female who has experienced an abnormal pattern of weight loss.  We did an extensive evaluation including blood work as well as CT of her chest abdomen and pelvis.  There is no evidence of malignancy causing her symptoms.  She has had some new headaches so we also did do an MRI scan.  This was also normal.  In further discussions with her it seems that it is most likely a mismatch from her dietary caloric intake and edema that she exercises.  She exercises 2-1/2 hours a day.  We discussed that she does need to increase the calories that she is taking in in order to meet this caloric demand.  She should be eating at least 500 extra calories a day.  She can get this through tylor ideally, however if she is having difficulty adding those additional calories with foods, she can consider daily boost or Ensure.  She states she prefers to try to increase her calories with food and will recheck in another 4 to 6 weeks to see how her weight is doing.  We also discussed another option would be cutting back on her exercise if she is not willing or able to take in more calories.  Her current BMI is below the normal range and puts her at high risk for osteoporosis and other applications.    2. Closed fracture of multiple ribs of left side with routine healing, subsequent encounter  X-ray of the left chest revealed that the rib fractures are healing though slowly.  Encouraged that she continue with calcium and vitamin D.  That she avoid a lot of twisting or heavy lifting with the upper extremity.  She is currently asymptomatic with it.  - XR Ribs Left W PA Chest; Future    3. Dermatitis  - triamcinolone (KENALOG) 0.1 % cream; Apply 1 application topically daily as needed.  Dispense: 30 g; Refill: 3    4. External hemorrhoids  - hydrocortisone (PROCTOSOL HC) 2.5 % rectal cream; Apply to the affected area twice daily as needed.  Dispense: 28.35 g; Refill: 0      All medications  have been reviewed and reconciled.    Return in about 1 month (around 1/17/2020).    Patient Instructions   - Shoot for at least 500 extra calories per day. Monitor to see if you are actually gaining weight with the increased calorie intake.   - Start eating food with carbohydrates, not just protein.   - Follow-up with Dr. Cervantes in a month after making these changes.   - Try [facility] Well[facility] if you are interested.       Orders Placed This Encounter   Procedures     XR Ribs Left W PA Chest     Standing Status:   Future     Number of Occurrences:   1     Standing Expiration Date:   12/17/2020     Order Specific Question:   Reason for Exam (Describe Symptoms):     Answer:   history of rib fracture     Order Specific Question:   Is the patient pregnant?     Answer:   No     Order Specific Question:   Can the procedure be changed per Radiologist protocol?     Answer:   Yes     Medications Discontinued During This Encounter   Medication Reason     triamcinolone (KENALOG) 0.1 % cream Reorder     hydrocortisone (PROCTOSOL HC) 2.5 % rectal cream Reorder         CHIEF COMPLAINT;  Chief Complaint   Patient presents with     Follow-up     Weight check     Medication Refill     imitrex     Xray     Want to repeat rib xray per pt       HISTORY OF PRESENT ILLNESS:  Katina is a 59 y.o. female presenting to the clinic today for a follow-up weight check and an x-ray of her ribs.    Ribs: She has not been exercising her upper body as much because of her ribs, she wants to make sure that her ribs are not displaced anymore before she starts.     Dizzy spells: She gets dizzy spells that come out of nowhere. Her last one was on Friday, 4 days ago. When these occur she gets nauseous, pale, and diaphoretic. Sometimes she sees strange spots in her vision. She does not want anymore labs or workups done. She says that she could start monitoring her glucose levels using her son's glucometer.     Health maintenance: She is eating more meat,  but she does not eat as many fruits and vegetables. She does eat some grains, but not a lot. She eats more when she is at work, she has set break and meal times. When she is on her own, she doesn't eat a meal until the afternoon. She says she likes eating sweets, but only if she has worked out that day. She exercises 6 days a week for 2-2.5 hours. She stopped exercising when her ribs were bothering her for a month, but she did not gain any weight. She says that she has always exercised like this, so she does not know why she has started to lose weight. She thinks she feels better when she is at least 90 lbs.     Medication maintenance: She is supposed to take an aspirin a day, she has not been keeping up with this. She would like a refill of the 2 creams and the sumatriptan.     REVIEW OF SYSTEMS:  All other systems are negative.    PFSH:  Reviewed, as below.    TOBACCO USE:  Social History     Tobacco Use   Smoking Status Never Smoker   Smokeless Tobacco Never Used       VITALS:  Vitals:    12/17/19 1622   BP: 110/67   Patient Site: Left Arm   Patient Position: Sitting   Cuff Size: Child   Pulse: (!) 47   Weight: (!) 89 lb 12.8 oz (40.7 kg)     Wt Readings from Last 3 Encounters:   12/17/19 (!) 89 lb 12.8 oz (40.7 kg)   10/08/19 (!) 88 lb 4.8 oz (40.1 kg)   10/06/19 (!) 91 lb 11.2 oz (41.6 kg)     Body mass index is 16.42 kg/m .    PHYSICAL EXAM:  GENERAL APPEARANCE: Alert, cooperative, no distress, appears stated age  HEAD: Normocephalic, without obvious abnormality, atraumatic     LUNGS: Clear to auscultation bilaterally, respirations unlabored  CHEST WALL: No tenderness or deformity  HEART: Regular rate and rhythm, S1 and S2 normal, no murmur, rub or gallop      RECENT RESULTS  No results found for this or any previous visit (from the past 48 hour(s)).    QUALITY MEASURES:  The following low BMI interventions were performed this visit: weight gain advised, nutrition/feeding management, dietary management  education, guidance, and counseling and lifestyle education regarding diet    ADDITIONAL HISTORY SUMMARIZED (2): None.  DECISION TO OBTAIN EXTRA INFORMATION (1): None.  RADIOLOGY TESTS (1): Reviewed MRI of brain from 11/19/2019. Reviewed x-ray of left finger from 10/6/2019.   LABS (1): Reviewed thyroid cascade, lipase, cortisol, and protein levels from 9/30/2019.  MEDICINE TESTS (1): None.  INDEPENDENT REVIEW (2 each): None.    The visit lasted a total of 10 minutes face to face with the patient. Over 50% of the time was spent counseling and educating the patient about ribs and weight loss.    IDotty, am scribing for and in the presence of, Dr. Cervantes.    I, Dr. Cervantes, personally performed the services described in this documentation, as scribed by Dotty Manjarrez in my presence, and it is both accurate and complete.    Dragon dictation was used for this note.  Speech recognition errors are a possibility.    MEDICATIONS:  Current Outpatient Medications   Medication Sig Dispense Refill     aspirin 325 MG tablet Take 325 mg by mouth daily.       cholecalciferol, vitamin D3, 1,000 unit Chew Chew 1,000 Units daily. 90 tablet 1     cyclobenzaprine (FLEXERIL) 5 MG tablet 5-10mg every 8 hours as needed for muscle spasm 30 tablet 0     hydrocortisone (PROCTOSOL HC) 2.5 % rectal cream Apply to the affected area twice daily as needed. 28.35 g 0     ketorolac (TORADOL) 10 mg tablet Take 1 tablet (10 mg total) by mouth every 6 (six) hours as needed. 30 tablet 0     lidocaine (LIDODERM) 5 % 12 hours on and 12 hours off 15 patch 0     naproxen-diphenhydramine (ALEVE PM) 220-25 mg Tab Take 1 tablet by mouth daily as needed.       ondansetron (ZOFRAN ODT) 4 MG disintegrating tablet Take 1 tablet (4 mg total) by mouth every 8 (eight) hours as needed for nausea. 12 tablet 1     ondansetron (ZOFRAN) 4 MG tablet Take 1 tablet (4 mg total) by mouth every 8 (eight) hours as needed for nausea. 20 tablet 1     oxyCODONE  (ROXICODONE) 5 MG immediate release tablet 1/2 tab po every 8 hours as needed for pain 12 tablet 0     TiZANidine (ZANAFLEX) 2 MG capsule Take 1 capsule (2 mg total) by mouth 3 (three) times a day. 20 capsule 0     triamcinolone (KENALOG) 0.1 % cream Apply 1 application topically daily as needed. 30 g 3     SUMAtriptan (IMITREX) 50 MG tablet Take 1 tablet (50 mg total) by mouth once as needed for migraine. 10 tablet 2     Current Facility-Administered Medications   Medication Dose Route Frequency Provider Last Rate Last Dose     denosumab 60 mg (PROLIA 60 mg/ml)  60 mg Subcutaneous Q6 Months Katelyn Macias NP   60 mg at 08/28/19 1237       Total data points: 2

## 2021-06-04 NOTE — TELEPHONE ENCOUNTER
Left message to call back for: medication quest/ results  Information to relay to patient:  Below message

## 2021-06-04 NOTE — TELEPHONE ENCOUNTER
Can you please let pt know I increased the dose of her sumatriptan. Also that her chest xray shows that the rib fractures have not completely healed however do show signs of healing and callus- Still use caution with twisting exercises and upper extremity exercises

## 2021-06-04 NOTE — TELEPHONE ENCOUNTER
Medication Question or Clarification  Who is calling: Patient  What medication are you calling about? (include dose and sig)   SUMAtriptan (IMITREX) 25 MG tablet 30 tablet 0 5/13/2019     Sig - Route: Take 1 tablet (25 mg total) by mouth every 2 (two) hours as needed for migraine. Max 200 mg/24 hours - Oral    Sent to pharmacy as: SUMAtriptan (IMITREX) 25 MG tablet      Who prescribed the medication?: Juan Pablo Velasquez  What is your question/concern?: Patient requesting to increase Sumatriptan 25 mg to 50 mg  For migraine . Please advise .  Pharmacy: [facility] # [id number]  Okay to leave a detailed message?: No  Site CMT - Please call the pharmacy to obtain any additional needed information.

## 2021-06-04 NOTE — PATIENT INSTRUCTIONS - HE
- Shoot for at least 500 extra calories per day. Monitor to see if you are actually gaining weight with the increased calorie intake.   - Start eating food with carbohydrates, not just protein.   - Follow-up with Dr. Cervantes in a month after making these changes.   - Try [facility] if you are interested.

## 2021-06-05 VITALS
DIASTOLIC BLOOD PRESSURE: 70 MMHG | HEART RATE: 53 BPM | BODY MASS INDEX: 17.67 KG/M2 | SYSTOLIC BLOOD PRESSURE: 110 MMHG | WEIGHT: 96.6 LBS | OXYGEN SATURATION: 98 %

## 2021-06-05 NOTE — TELEPHONE ENCOUNTER
Medication Request  Medication name:   Valtrex  200mg capsules  5x daily by mouth    Requested Pharmacy: [facility] #[id number]  Reason for request:   Patient is in active outbreak of cold sores  Patient is requesting 200 mg capsules as she cannot swallow large tablets  When did you use medication last?:    Currently taking.  Patient offered appointment:  No  Okay to leave a detailed message: yes         Patient is requesting refills of above medication.

## 2021-06-05 NOTE — TELEPHONE ENCOUNTER
The 200 mg dosing request is also consistent with acyclovir. I sent in the rx for acyclovir to the pharmacy

## 2021-06-05 NOTE — TELEPHONE ENCOUNTER
I do not see she has been prescribed valtrex before but has been prescribed acyclovir      Please advise

## 2021-06-05 NOTE — TELEPHONE ENCOUNTER
BK    In regards of upcoming appt... for prolia scheduled in march, as long as she reschedules, ok to keep?    Date: 1/28/2020 Status: Chey   Time: 2:20 PM Length: 40   Visit Type: OFFICE VISIT LONG [[id number]] Copay: $0.00   Provider: Katelyn Macias NP Department: [facility] ENDOCRINOLOGY   Referring Provider: RAE GUAN CSN: [id number]   Notes: ZACK- Dr. Rosa Downs         EOD Info: OFFICE VISIT LONG with  Katelyn Macias NP in [facility] ENDO EOD Status: No Show

## 2021-06-05 NOTE — TELEPHONE ENCOUNTER
Per patient decline to come in tomorrow. Patient only wants the [place] location.  She works [phone]. Appointment reschedule to 07.07.2020.    Are we cancelling the prolia injection?

## 2021-06-06 NOTE — PROGRESS NOTES
HPI: This patient presents to clinic today for cerumen removal. Has noticed some fullness of the ears and muffled hearing, but denies otalgia, otorrhea, vertigo, change in true hearing or tinnitus. Denies other symptoms. Last cleaned out almost exactly a year ago     Past medical history, surgical history, family history, social history, medications, and allergies have been reviewed and are documented above.      Review of Systems: a 10-system review was performed. Symptoms are noted in the HPI and on a scanned document in the computer chart.     Physical Examination:   GEN: no acute distress, alert and oriented  EYES: extraocular movements intact, pupils equal and round. Sclera clear.   EARS: bilateral cerumen impactions cleared under binocular microscopy using suction/loop/forceps. The tympanic membranes are noted to be intact and clear with no signs of infections, effusions, perforations, or retractions.  PULM: breathing comfortably on room air with no stertor or stridor. Chest expansion symmetric.  CARDS: no cyanosis or clubbing, normal carotid pulses     MEDICAL DECISION-MAKING: cerumen impactions cleared under binocular microscopy without difficulty. Hearing restored to baseline. Follow-up PRN.

## 2021-06-06 NOTE — PROGRESS NOTES

## 2021-06-08 NOTE — PROGRESS NOTES
"Katina Hoffman is a 60 y.o. female who is being evaluated via a billable telephone visit.      The patient has been notified of following:     \"This telephone visit will be conducted via a call between you and your physician/provider. We have found that certain health care needs can be provided without the need for a physical exam.  This service lets us provide the care you need with a short phone conversation.  If a prescription is necessary we can send it directly to your pharmacy.  If lab work is needed we can place an order for that and you can then stop by our lab to have the test done at a later time.    Telephone visits are billed at different rates depending on your insurance coverage. During this emergency period, for some insurers they may be billed the same as an in-person visit.  Please reach out to your insurance provider with any questions.    If during the course of the call the physician/provider feels a telephone visit is not appropriate, you will not be charged for this service.\"    Patient has given verbal consent to a Telephone visit? Yes    What phone number would you like to be contacted at? [phone]    Patient would like to receive their AVS by AVS Preference: Yvon.    Additional provider notes:       Reason for visit      1. Age-related osteoporosis without current pathological fracture    2. Vitamin D deficiency        History     Katina Hoffman is a very pleasant 60 y.o. old female who presents for follow up.   SUMMARY:    Katina is contacted today in f/u for Osteoporosis.  She is a ZACK from Dr Lee, whom she last saw in 2018.  He started her on Prolia for treatment because of her extensive hx with GERD and Gastritis. She has tried Fosamax and Actonel and had severe reflux with both.  She has had no problems with them. She notes that she broke some ribs last year after a fall.  She also reports that she experienced some sharp pain the other day, in her rib cage, and noted that it " looked like one rib suddenly started bulging. The pain eventually subsided and the bulge went away. Most likely a muscle spasm.  She just had a Dexa Scan in January, and it showed: Since the previous bone density dated  December 21, 2017, there has been a   +5.1 % change in the bone density of the spine.  Additionally there has been a +4.2 % change in the left total hip and a nonstatistically significant +3.5 % change in the right total hip.  This represents a positive response to treatment.  She had her last Prolia in March of this year. She is walking regularly and remains active.         Risk Factors     The following high- risk conditions have been ruled out: celiac disease, eating disorders, gastric bypass, hyperparathyroidism, inflammatory bowel disease, hyperthyroidism, rheumatoid arthritis, lupus, chronic kidney disease.    Katina Hoffman has the following risk factors: Age, Female gender, , Low BMI, Family history of osteoporosis and Early menopause (<45)    She is not on high risk medications such as glucocorticoids, anti-coagulants, anti-convulsants, chemotherapy.    Patient deniesHysterectomy, Oophrectomy, Breast cancer and Family history of breast cancer.    Menopause was at age: early 40s.     Past Medical History     Patient Active Problem List   Diagnosis     Abnormal Weight Loss     Chest Pain     Vitamin D Deficiency     Foot Pain (Soft Tissue)     Fatigue     Head External Swelling Occipital Left Side (___ Cm)     Abdominal Pain In The Central Upper Belly (Epigastric)     Serum Enzyme Levels - Lipase Elevated     Dizziness - 15 seconds to 4 hoiurs - episodic - no provocatory maneuvers     Cerumen Impaction     Earache (Symptom)     Tendonitis Of The Achilles Tendon     Tendonitis     Exostosis - left anterolateral skull - no change 2005 ---> 2013 - perceived as enlarging on 12/5/14     Right flank painv- onset about 11/5/14 - no injury     Visual field cut     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    Social History      reports that she has never smoked. She has never used smokeless tobacco. She reports current alcohol use of about 2.0 standard drinks of alcohol per week. She reports that she does not use drugs.      Review of Systems     Patient denies current pain, limited mobility, fractures.   Remainder per HPI.      Vital Signs     There were no vitals taken for this visit.        Assessment     1. Age-related osteoporosis without current pathological fracture    2. Vitamin D deficiency        Plan     Katina will remain on the Prolia injections. We discussed the plan for cessation at 8-10 years and transition to Bisphosphonate. She will likely need to take Reclast, given her GI hx. She will f/u with me in 1 year with labs.     Her PCP, Dr Cervantes, will continue monitoring her Hypothyroidism as she has been doing.     Current Medications     Outpatient Medications Prior to Visit   Medication Sig Dispense Refill     aspirin 325 MG tablet Take 325 mg by mouth daily.       cholecalciferol, vitamin D3, 1,000 unit Chew Chew 1,000 Units daily. 90 tablet 1     cyclobenzaprine (FLEXERIL) 5 MG tablet 5-10mg every 8 hours as needed for muscle spasm 30 tablet 0     hydrocortisone (PROCTOSOL HC) 2.5 % rectal cream Apply to the affected area twice daily as needed. 28.35 g 0     ketorolac (TORADOL) 10 mg tablet Take 1 tablet (10 mg total) by mouth every 6 (six) hours as needed. 30 tablet 0     lidocaine (LIDODERM) 5 % 12 hours on and 12 hours off (Patient taking differently: 12 hours on and 12 hours off prn)  15 patch 0     naproxen-diphenhydramine (ALEVE PM) 220-25 mg Tab Take 1 tablet by mouth daily as needed.       ondansetron (ZOFRAN) 4 MG tablet Take 1 tablet (4 mg total) by mouth every 8 (eight) hours as needed for nausea. 20 tablet 1     ondansetron (ZOFRAN-ODT) 4 MG disintegrating tablet DISSOLVE 1 TABLET(4 MG) ON THE TONGUE EVERY 8 HOURS AS NEEDED FOR NAUSEA 12 tablet 1     TiZANidine (ZANAFLEX) 2 MG capsule Take 1 capsule (2 mg total) by mouth 3 (three) times a day. 20 capsule 0     triamcinolone (KENALOG) 0.1 % cream Apply 1 application topically daily as needed. 30 g 3     oxyCODONE (ROXICODONE) 5 MG immediate release tablet 1/2 tab po every 8 hours as needed for pain 12 tablet 0     SUMAtriptan (IMITREX) 50 MG tablet Take 1 tablet (50 mg total) by mouth once as needed for migraine. 10 tablet 2     Facility-Administered Medications Prior to Visit   Medication Dose Route Frequency Provider Last Rate Last Dose     denosumab 60 mg (PROLIA 60 mg/ml)  60 mg Subcutaneous Q6 Months Katelyn Macias, NP   60 mg at 08/28/19 1237     denosumab 60 mg (PROLIA 60 mg/ml)  60 mg Subcutaneous Q6 Months Katelyn Macias, NP   60 mg at 03/04/20 1233         Lab Results     TSH   Date Value Ref Range Status   09/30/2019 2.80 0.30 - 5.00 uIU/mL Final     PTH   Date Value Ref Range Status   04/13/2018 112 (H) 10 - 86 pg/mL Final     Calcium   Date Value Ref Range Status   05/22/2020 9.2 8.5 - 10.5 mg/dL Final     Phosphorus   Date Value Ref Range Status   04/13/2018 3.4 2.5 - 4.5 mg/dL Final           Imaging Results   Last DEXA scan:  Results for orders placed in visit on 10/23/19   DXA Bone Density Scan    Narrative 1/30/2020      RE: Katina Hoffman  YOB: 1960        Dear Katelyn Macias,    Patient Profile:  59 y.o. female, postmenopausal, is here for the follow up bone density   test.   History of fractures - Yes;  Ribs. Family history of osteoporosis - Yes;    mother.  Family history of hip fracture: None.  Smoking history - No.   Osteoporosis treatment past -  Yes;  HRT. Osteoporosis treatment current -   Yes;  Prolia.  Chronic medical problems - Premature menopause. High risk   medications -  None.      Assessment:    1. The spine bone density L1-L4 with T-score -2.1.  2. Femoral bone densities show left femoral neck T- score -2.2 and right   femoral neck T-score -1.9.  3. Trabecular bone score indicates moderate trabecular bone architecture.      59 y.o. female with LOW BONE DENSITY (OSTEOPENIA) , currently receiving   treatment with Prolia.     Since the previous bone density dated  December 21, 2017, there has been a     +5.1 % change in the bone density of the spine.  Additionally there has   been a +4.2 % change in the left total hip and a nonstatistically   significant +3.5 % change in the right total hip.  This represents a   positive response to treatment.    Recommendations:  A continuation of the current treatment is recommended with follow up bone   density scan in 2 years.      Bone densitometry was performed on your patient using our [employer]   densitometer. The results are summarized and a copy of the actual scans   are included for your review. In conformity with the International Society   of Clinical Densitometry's most recent position statement for DXA   interpretation (2015), the diagnosis will be made on the lowest measured   T-score of the lumbar spine, femoral neck, total proximal femur or 33%   radius. Note the change in terminology for diagnostic classification from   OSTEOPENIA to LOW BONE MASS. All trending for sequential exams will be   done using multiple vertebrae or the total proximal femur. Fracture risk   is based on the WHO Fracture Risk Assessment Tool (FRAX). If additional   information is needed or if you would like to discuss the results, please   do not hesitate to call me.       Thank you for referring this patient to [facility].   We are happy to be  of service in support of you and your practice. If you   have any questions or suggestions to improve our service, please call me   at [phone].     Sincerely,     Erika Stock M.D. C.CBC.  Osteoporosis Services, [facility]         Phone call duration: 20 minutes    Marysol VILLANUEVA

## 2021-06-08 NOTE — TELEPHONE ENCOUNTER
RN cannot approve Refill Request    RN can NOT refill this medication med is not covered by policy/route to provider     . Last office visit: 5/13/2019 Eva Almeida MD Last Physical: Visit date not found Last MTM visit: Visit date not found Last visit same specialty: 12/17/2019 Padmini Cervantes MD.  Next visit within 3 mo: Visit date not found  Next physical within 3 mo: Visit date not found      Milagros Garcia, [facility] [place] Connection Triage/Med Refill 5/12/2020    Requested Prescriptions   Pending Prescriptions Disp Refills     ondansetron (ZOFRAN-ODT) 4 MG disintegrating tablet [Pharmacy Med Name: ONDANSETRON ODT 4MG TABLETS] 12 tablet 1     Sig: DISSOLVE 1 TABLET(4 MG) ON THE TONGUE EVERY 8 HOURS AS NEEDED FOR NAUSEA       There is no refill protocol information for this order

## 2021-06-10 NOTE — PROGRESS NOTES
Assessment/Plan:     1. Visual field cut  Patient states intolerance to simvastatin.  Willing to try another medication.  Recommend starting after statin related myalgias have improved and based on results of labs as below.  Recommend starting with once a week.  If tolerating okay to increase to twice per week.  Also referred to ophthalmology for further evaluation  - atorvastatin (LIPITOR) 10 MG tablet; Take 1 tablet (10 mg total) by mouth daily.  Dispense: 30 tablet; Refill: 11  - Ambulatory referral to Ophthalmology    2. Biceps tendonitis, right  Supportive care discussed.  Call return to care if symptoms worsen or do not improve.  Patient declines referral out to PT    3. Spasm of thoracic back muscle  Declines physical therapy or spine clinic is interested in chiropractic and will refill muscle relaxers has been helpful to her in the past.  - cyclobenzaprine (FLEXERIL) 5 MG tablet; Take 1-2 tablets (5-10 mg total) by mouth 3 (three) times a day as needed for muscle spasms.  Dispense: 30 tablet; Refill: 0  - Ambulatory referral to Chiropractic    4. Muscle ache  We will get labs for further evaluation.  - Hepatic Profile  - CK Total  Total time spent was 45 minutes, >50% spent discussing treatment options noted above, counseling and coordination of care.     Subjective:      Katina Hoffman is a 57 y.o. female comes in today for a hospital follow-up but she is out of the 30 day window.  She was in [facility] April 5 through the 6 for a visual field change.  She states that the morning of admission while she was sitting at work on her left side for about 10-15 minutes she felt that she could not fully see out of the lateral side of left eye.  States that otherwise she felt okay she had no chest pain dizziness or headache.  She does not typically get migraines.  She states that no new change in medications.  In the emergency room they performed imaging which was essentially normal.  She was seen by  cardiology and neurology.  Also had echocardiogram.  Reviewed specialist visit notes.  She does have some abnormality in the aortic valve and so it was recommended that she stays on full dose aspirin for life.  The also recommended she started cholesterol medication although cholesterol was not significantly elevated.  She states she took Zocor for 21 days and had significant muscle aches wants to know what else she could try.  It has gotten somewhat improved since she stopped them but still some aching.  She also feels some weakness and tenderness on the right side of the biceps area especially when she holds her hand up.  Also somewhat in the back shoulder she feels tightness like muscle spasms.  Aleve is not really helping.  Previously used Flexeril which did seem to help.  No trauma or new activity.  Blood pressure is well controlled.  Reviewed the labs that were done medical tests imaging visit notes no other new concerns.  Performed medication reconciliation.    Current Outpatient Prescriptions   Medication Sig Dispense Refill     ascorbic acid-vitamin E-biotin (HAIR, SKIN, NAILS WITH BIOTIN) 7.5-7.5-1,250 mg-unit-mcg Chew Chew 2 capsules daily.       aspirin 325 MG EC tablet Take 1 tablet (325 mg total) by mouth daily.  0     cholecalciferol, vitamin D3, 1,000 unit Chew Chew 2,000 Units daily.       cyanocobalamin, vitamin B-12, 1,000 mcg Subl Place 1,000 mcg under the tongue daily.       multivit with min-folic acid 200 mcg Chew Chew 2 capsules daily.       naproxen-diphenhydramine (ALEVE PM) 220-25 mg Tab Take 1 tablet by mouth daily as needed.       atorvastatin (LIPITOR) 10 MG tablet Take 1 tablet (10 mg total) by mouth daily. 30 tablet 11     cyclobenzaprine (FLEXERIL) 5 MG tablet Take 1-2 tablets (5-10 mg total) by mouth 3 (three) times a day as needed for muscle spasms. 30 tablet 0     No current facility-administered medications for this visit.        Past Medical History, Family History, and Social  History reviewed.  Past Medical History:   Diagnosis Date     Exostosis - left anterolateral skull - no change  --->  - perceived as enlarging on 14     Past Surgical History:   Procedure Laterality Date     BREAST BIOPSY Right     benign      SECTION       Penicillins  Family History   Problem Relation Age of Onset     Breast cancer Maternal Aunt 65     Breast cancer Paternal Aunt      Social History     Social History     Marital status:      Spouse name: N/A     Number of children: N/A     Years of education: N/A     Occupational History     Not on file.     Social History Main Topics     Smoking status: Never Smoker     Smokeless tobacco: Never Used     Alcohol use 1.2 oz/week     2 Glasses of wine per week     Drug use: No     Sexual activity: Not on file     Other Topics Concern     Not on file     Social History Narrative         Review of systems is as stated in HPI, and the remainder of the 10 system review is otherwise negative.    Objective:     Vitals:    17 1049   BP: 94/50   Patient Site: Right Arm   Patient Position: Sitting   Cuff Size: Adult Regular   Pulse: 64   Weight: 103 lb 12.8 oz (47.1 kg)    Body mass index is 19.61 kg/(m^2).    General Appearance:    Alert, cooperative, no distress, appears stated age   Head:    Normocephalic, without obvious abnormality, atraumatic   Eyes:    PERRL, EOM's intact   Ears:    Normal TM's and external ear canals   Nose:   Mucosa normal, no drainage     or sinus tenderness   Throat:   Oropharynx is clear   Neck:   Supple, symmetrical, no adenopathy, no thyromegally, no carotid bruit        Lungs:     Clear to auscultation bilaterally, respirations unlabored   Chest Wall:    No tenderness or deformity    Heart:    Regular rate and rhythm, S1 and S2 normal, no murmur, rub    or gallop       Abdomen:     Soft, non-tender, bowel sounds active all four quadrants,     no masses, no organomegaly            Extremities        Neuro:  seems to be some tenderness swelling and weakness on right bicep.  Also muscle spasm right upper thoracic.  Overall good range of motion in extremities, otherwise extremities normal, atraumatic, no cyanosis or edema  Normal sensation reflexes in extremities    Pulses:   2+ and symmetric all extremities   Skin:   No rashes or lesions         This note has been dictated using voice recognition software. Any grammatical or context distortions are unintentional and inherent to the the software.

## 2021-06-10 NOTE — TELEPHONE ENCOUNTER
Date: 9/14/2020 Status: [facility]   Time: 3:30 PM Length: 15   Visit Type: NURSE VISIT [[id number]] Copay: $0.00   Provider: [facility] Department: [facility]   Referring Provider: RAE GUAN CSN: [id number]   Notes: prolia injection

## 2021-06-10 NOTE — TELEPHONE ENCOUNTER
----- Message from Jailene Pablo RN sent at 8/11/2020  3:04 PM CDT -----  Pt due for prolia 9/4 or after. Please call to schedule. Thanks  ----- Message -----  From: Jailene Pablo RN  Sent: 3/4/2020  12:38 PM CDT  To: Jailene Pablo RN    Prolia last inj 3/3/20

## 2021-06-10 NOTE — PROGRESS NOTES
HPI: This patient presents to clinic today for cerumen removal. Has noticed some fullness of the ears and muffled hearing, but denies otalgia, otorrhea, vertigo, change in true hearing or tinnitus. Denies other symptoms. Last cleaned out Feb 2020. She does note that she has been waking up in the mornings with a clenched and sore jaw, more left than right.     Past medical history, surgical history, family history, social history, medications, and allergies have been reviewed and are documented above.      Review of Systems: a 10-system review was performed. Symptoms are noted in the HPI and on a scanned document in the computer chart.     Physical Examination:   GEN: no acute distress, alert and oriented  EYES: extraocular movements intact, pupils equal and round. Sclera clear.   EARS: minimal cerumen bilaterally cleared under binocular microscopy using suction/loop/forceps. The tympanic membranes are noted to be intact and clear with no signs of infections, effusions, perforations, or retractions.  NECK: thin, no lymphadenopathy or masses. Palpable TMJ pop on the right, L>R pain on palpation.  PULM: breathing comfortably on room air with no stertor or stridor. Chest expansion symmetric.  CARDS: no cyanosis or clubbing, normal carotid pulses     MEDICAL DECISION-MAKING: bilateral cerumen cleared under binocular microscopy without difficulty. Ear pressure is referred from TMD. Discussed bite guard/nsaids/soft diet/no gum.  Can follow-up with her dentist. Follow-up PRN.

## 2021-06-14 NOTE — TELEPHONE ENCOUNTER
I called the pt to inform that she should continue on the 50,000 international unit(s) vitamin D. Pt understands. RX sent.

## 2021-06-14 NOTE — PROGRESS NOTES
ASSESSMENT/PLAN:  1. Age-related osteoporosis without current pathological fracture  Receiving Prolia treatment, repeat bone density is pending    2. Cervical radiculopathy  - MR Cervical Spine Without Contrast; Future    3. Closed fracture of multiple ribs of left side, initial encounter  Old fractures with recurrent pain, order xr.  toradol and cylcobenzaprine for pain  - ketorolac (TORADOL) 10 mg tablet; Take 1 tablet (10 mg total) by mouth every 6 (six) hours as needed.  Dispense: 30 tablet; Refill: 0  - cyclobenzaprine (FLEXERIL) 5 MG tablet; 5-10mg every 8 hours as needed for muscle spasm  Dispense: 30 tablet; Refill: 0  - XR Ribs Left W PA Chest; Future    4. Dermatitis  - triamcinolone (KENALOG) 0.1 % cream; Apply 1 application topically daily as needed.  Dispense: 30 g; Refill: 3    5. Vitamin D deficiency  Vit D level low, previously high dose supplementation    6. History of COVID-19  Positive antibody testing with history of Covid. Deciding on whether or not to pursue vaccination  - COVID-19 Virus (Coronavirus) Antibody & Titer Reflex; Future  - COVID-19 Virus (Coronavirus) Antibody & Titer Reflex    7. Pain in joint involving pelvic region and thigh, left  S/p fall, no fracture on xray  - XR Pelvis W 2 Vw Hip Left; Future    8. Fatigue, unspecified type  Labs suggest mild iron deficiency. She will start a supplement  - Comprehensive Metabolic Panel  - HM2(CBC w/o Differential)  - Thyroid Cascade  - Ferritin    9. Elevated lipase  Unclear etiology but has been present > 5 years without worsening and with normal CT imaging.  Continue to follow  - Amylase  - Lipase    10. Herpes labialis  - acyclovir (ZOVIRAX) 200 MG capsule; Take 1 capsule (200 mg total) by mouth daily.  Dispense: 30 capsule; Refill: 6      Dragon dictation was used for this note.  Speech recognition errors are a possibility.    No follow-ups on file.  Patient Instructions    Please Radiology call to schedule your appt at:  [phone].      Orders Placed This Encounter   Procedures     MR Cervical Spine Without Contrast     Standing Status:   Future     Standing Expiration Date:   1/5/2022     Order Specific Question:   Reason for Exam (Describe Symptoms):     Answer:   neck pain with radiation into left arm, left arm weakness     Order Specific Question:   Is the patient pregnant?     Answer:   No     Order Specific Question:   Can the procedure be changed per Radiologist protocol?     Answer:   Yes     Order Specific Question:   If this is a diagnostic procedure, have the patient's age and recent imaging history been considered?     Answer:   Yes     Order Specific Question:   Is the patient claustrophobic and in need of sedation to complete their MR scan?     Answer:   No     XR Pelvis W 2 Vw Hip Left     Standing Status:   Future     Number of Occurrences:   1     Standing Expiration Date:   1/5/2022     Order Specific Question:   Reason for Exam (Describe Symptoms):     Answer:   left posterior pelvic pain after fall, osteoporosis     Order Specific Question:   Is the patient pregnant?     Answer:   No     Order Specific Question:   Can the procedure be changed per Radiologist protocol?     Answer:   Yes     XR Ribs Left W PA Chest     Standing Status:   Future     Number of Occurrences:   1     Standing Expiration Date:   1/5/2022     Order Specific Question:   Reason for Exam (Describe Symptoms):     Answer:   history of left rib fractures, ongoing pain     Order Specific Question:   Is the patient pregnant?     Answer:   No     Order Specific Question:   Can the procedure be changed per Radiologist protocol?     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     Order Specific Question:   If yes     Answer:   Previously symptomatic  >14d since onset, currently asymptomatic     Comprehensive Metabolic Panel     HM2(CBC w/o Differential)     Thyroid Cascade     Ferritin     Amylase     Lipase     Medications Discontinued During This Encounter   Medication Reason     cholecalciferol, vitamin D3, 1,000 unit Chew Therapy completed     ketorolac (TORADOL) 10 mg tablet Reorder     cyclobenzaprine (FLEXERIL) 5 MG tablet Reorder     triamcinolone (KENALOG) 0.1 % cream Reorder     acyclovir (ZOVIRAX) 200 MG capsule Reorder         CHIEF COMPLAINT;  Chief Complaint   Patient presents with     Neck Pain     Wrist Pain     Left wrist     Orders Request     dexa scan       HISTORY OF PRESENT ILLNESS:  Katina is a 60 y.o. female presenting to the clinic today for several concerns.  First is that of some neck pain.  She states she has been having some cough in the mid neck that is radiating into her left arm with left arm weakness.  Been present for greater than 6 weeks.  She has had some neck problems in the past.  In addition, she is experiencing some pain in her left wrist.  Hurts worse with movement and activity.  No swelling or bruising.  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    She is due for her labs.  She does have a known history of elevated amylase and lipase.  Unreviewed's been elevated for at least 7 years now.  It is remained stable.  She is not experiencing any abdominal pain.  She initially was experiencing some weight loss and we did do CT imaging of her chest abdomen and pelvis.  This did not reveal any abnormalities.  We will continue to follow this.  Her weight loss was deemed to be secondary to not enough caloric intake for her activity levels.  I suspect that this mild level of malnutrition may be influencing some of her elevation in amylase and lipase.    She intermittently will have cold sores.  She would like a refill of her medication for this.    She has been following with endocrine and receiving Prolia treatments  for osteoporosis.  She has a known history of vitamin D deficiency and has been on high-dose supplementation in the past.    She did recently fall and is experiencing some pelvic pain as a result.  She is also noticing some pain in the same area where she fractured her lips previously.  She did not follow this area but is noticing recurrence of pain.  She does take Toradol and Flexeril intermittently for this pain and would like refills if appropriate.    Remainder of 12-point ROS is negative.    TOBACCO USE:  Social History     Tobacco Use   Smoking Status Never Smoker   Smokeless Tobacco Never Used       VITALS:  Vitals:    01/05/21 1618   BP: 110/70   Patient Site: Left Arm   Patient Position: Sitting   Cuff Size: Adult Regular   Pulse: (!) 53   SpO2: 98%   Weight: (!) 96 lb 9.6 oz (43.8 kg)     Wt Readings from Last 3 Encounters:   01/05/21 (!) 96 lb 9.6 oz (43.8 kg)   12/17/19 (!) 89 lb 12.8 oz (40.7 kg)   10/08/19 (!) 88 lb 4.8 oz (40.1 kg)     Body mass index is 17.67 kg/m .    PHYSICAL EXAM:  GENERAL APPEARANCE: Alert, cooperative, no distress, appears stated age  HEAD: Normocephalic, without obvious abnormality, atraumatic  EYES:  PERRL, conjunctiva/corneas clear, EOM's intact, fundi     benign, both eyes       EARS: Normal TM's and external ear canals, both ears  NOSE:  Nares normal, septum midline, mucosa normal, no drainage or sinus tenderness  THROAT: Lips, mucosa, and tongue normal; teeth and gums normal  NECK: Supple, symmetrical, trachea midline, no adenopathy;    thyroid:  No enlargement/tenderness/nodules; no carotid    bruit or JVD  BACK: Symmetric, no curvature, ROM normal, no CVA tenderness  LUNGS: Clear to auscultation bilaterally, respirations unlabored  CHEST WALL: No tenderness or deformity  HEART: Regular rate and rhythm, S1 and S2 normal, no murmur, rub or gallop  ABDOMEN: Soft, non-tender, bowel sounds active all four quadrants,     no masses, no organomegaly  EXTREMITIES: Extremities  normal, atraumatic, no cyanosis or edema  PULSES: 2+ and symmetric all extremities  SKIN: Skin color, texture, turgor normal, no rashes or lesions  LYMPH NODES: Cervical, supraclavicular, and axillary nodes normal  NEUROLOGIC: CNII-XII intact. Normal strength, sensation and reflexes       throughout    RECENT RESULTS  No results found for this or any previous visit (from the past 48 hour(s)).    MEDICATIONS:  Current Outpatient Medications   Medication Sig Dispense Refill     acyclovir (ZOVIRAX) 200 MG capsule Take 1 capsule (200 mg total) by mouth daily. 30 capsule 6     aspirin 325 MG tablet Take 325 mg by mouth daily.       ergocalciferol (ERGOCALCIFEROL) 1,250 mcg (50,000 unit) capsule Take 1 capsule (50,000 Units total) by mouth once a week. 12 capsule 1     ergocalciferol (ERGOCALCIFEROL) 1,250 mcg (50,000 unit) capsule Take 1 capsule (50,000 Units total) by mouth every 7 days. 12 capsule 1     hydrocortisone (PROCTOSOL HC) 2.5 % rectal cream Apply to the affected area twice daily as needed. 28.35 g 0     lidocaine (LIDODERM) 5 % 12 hours on and 12 hours off (Patient taking differently: 12 hours on and 12 hours off prn) 15 patch 0     naproxen-diphenhydramine (ALEVE PM) 220-25 mg Tab Take 1 tablet by mouth daily as needed.       ondansetron (ZOFRAN-ODT) 4 MG disintegrating tablet DISSOLVE 1 TABLET(4 MG) ON THE TONGUE EVERY 8 HOURS AS NEEDED FOR NAUSEA 12 tablet 1     TiZANidine (ZANAFLEX) 2 MG capsule Take 1 capsule (2 mg total) by mouth 3 (three) times a day. 20 capsule 0     cyclobenzaprine (FLEXERIL) 5 MG tablet 5-10mg every 8 hours as needed for muscle spasm 30 tablet 0     ketorolac (TORADOL) 10 mg tablet Take 1 tablet (10 mg total) by mouth every 6 (six) hours as needed. 30 tablet 0     SUMAtriptan (IMITREX) 50 MG tablet        triamcinolone (KENALOG) 0.1 % cream Apply 1 application topically daily as needed. 30 g 3     Current Facility-Administered Medications   Medication Dose Route Frequency Provider  Last Rate Last Admin     denosumab 60 mg (PROLIA 60 mg/ml)  60 mg Subcutaneous Q6 Months Katelyn Macias NP   60 mg at 08/28/19 1237     denosumab 60 mg (PROLIA 60 mg/ml)  60 mg Subcutaneous Q6 Months Katelyn Macias NP   60 mg at 09/14/20 1544

## 2021-06-14 NOTE — TELEPHONE ENCOUNTER
Patient called. She had planned on having a tooth extracted in January, but decided to see someone else for a second opinion and didn't have the tooth extracted.   She is scheduled for a Prolia injection in March, and would like to know when it'll be safe for her to either have the tooth extracted before and after receiving the Prolia injection.    Katina @ [phone]

## 2021-06-14 NOTE — TELEPHONE ENCOUNTER
I called the pt and discussed. Pt is haing an extraction. Her next prolia was pushed out to allow for 2 months of healing and her lats prolia was in Sept.

## 2021-06-14 NOTE — TELEPHONE ENCOUNTER
Patient called.     She is scheduled for dental work on Thursday 01.28.2021.   She would like a call from [facility] or [facility] to discuss treatment and prolia.    Katina @ [phone] before 330, after 330 best number is

## 2021-06-15 PROBLEM — H53.40 VISUAL FIELD CUT: Status: ACTIVE | Noted: 2017-04-05

## 2021-06-15 PROBLEM — I35.8 LAMBL'S EXCRESCENCE ON AORTIC VALVE: Status: ACTIVE | Noted: 2017-04-05

## 2021-06-15 NOTE — PROGRESS NOTES
CC:    #1. R rib pain  #2. Hearing problem      HPI:    #1. Right rib pain - moderate with movement and deep inspiration, improves with rest, started 3 days ago after she hit her right side against her island. No abdominal pain, shortness of breath. No bruising of ribs.    #2. Hearing problem - reduced hearing x a month, no ear pain, discharge, cough, nasal congestion.     ROS: Pertinent ROS noted in HPI.     Allergies   Allergen Reactions     Penicillins Rash       Patient Active Problem List   Diagnosis     Abnormal Weight Loss     Chest Pain     Vitamin D Deficiency     Anxiety     Foot Pain (Soft Tissue)     Fatigue     Head External Swelling Occipital Left Side (___ Cm)     Abdominal Pain In The Central Upper Belly (Epigastric)     Serum Enzyme Levels - Lipase Elevated     Dizziness - 15 seconds to 4 hoiurs - episodic - no provocatory maneuvers     Cerumen Impaction     Earache (Symptom)     Tendonitis Of The Achilles Tendon     Tendonitis     Exostosis - left anterolateral skull - no change 2005 ---> 2013 - perceived as enlarging on 12/5/14     Right flank painv- onset about 11/5/14 - no injury     Visual field cut     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     Social History Main Topics     Smoking status: Never Smoker     Smokeless tobacco: Never Used     Alcohol use 1.2 oz/week     2 Glasses of wine per week     Drug use: No     Sexual activity: Not on file     Other Topics Concern     Not on file     Social History Narrative       Objective:    Vitals:    12/29/17 1425   BP: 114/78   Pulse: (!) 50   Resp: 14   Temp: 97.3  F (36.3  C)   SpO2: 100%        Gen: NAD  Ears: Initial exam showed partial left, and complete right cerumen impaction. After ear lavages, exam showed normal TMs and canals   CV: RRR  PulM: CTAB  Chest wall: normal inspection, mild TTP mid right posterior, lateral, and anterior ribs.  Abd: normal BS, NTTP    XR - no acute rib fx nor pneumothorax per my interpretation, discussed during visit.     Rib pain on right side - advised against heavy lifting/overhead reaching, OTC analgesics prn.   -     XR Ribs Right W PA Chest  -     Nursing communication    Bilateral impacted cerumen - ear was removed by staff without event.   -     Ear wax removal  -     Nursing communication

## 2021-06-15 NOTE — PROGRESS NOTES
HPI: This patient is a 61yo F known to me for cerumen who presents for that and an evaluation of the throat. There has been a globus sensation for a few weeks most noticeable in the mornings. Denies fevers, otalgia, weight loss, odynophagia, dysphagia, hemoptysis, and shortness of breath. Does not complain of sour taste, heartburn, or burping. Is not taking reflux medication.    Past medical history, surgical history, social history, family history, medications, and allergies have been reviewed with the patient and are documented above.    Review of Systems: a 10-system review was performed. Pertinent positives are noted in the HPI and on a separate scanned document in the chart.    PHYSICAL EXAMINATION:  GEN: no acute distress, normocephalic  EYES: extraocular movements are intact, pupils are equal and round. Sclera clear.   EARS: auricles are normally formed. The external auditory canals are cleared of cerumen impactions bilaterally with a loop/forceps under binocular microscopy. Tympanic membranes are intact bilaterally with no signs of infection, effusion, retractions, or perforations.  NOSE: anterior nares are patent. There are no masses or lesions. The septum is non-obstructing.  OC/OP: clear, dentition is in good repair. The tongue and palate are fully mobile and symmetric. No masses or lesions. Cobblestoning of the posterior pharyngeal wall.  HP/L (scope): nasopharynx, base of tongue, vallecula, epiglottis, and pyriform sinuses are clear. The bilateral vocal folds are mobile and without lesion. There is interarytenoid pachydermia and some hyperemia of the laryngeal surface of the epiglottis c/w LPR.  NECK: soft and supple. No lymphadenopathy or masses. Airway is midline.  NEURO: CN VII and XII symmetric. alert and oriented. No spontaneous nystagmus. Gait is normal.  PULM: breathing comfortably on room air, normal chest expansion with respiration  CARDS: no cyanosis or clubbing    FLEXIBLE LARYNGOSCOPY: Scope  inserted bilaterally to examine nasal tissue, nasopharynx, posterior oropharynx, hypopharynx, and larynx. See exam notes for exam finding details. Patient tolerated the procedure well.    MEDICAL DECISION-MAKING: This patient is a 61yo F with chronic laryngitis/globus sensation from mild silent acid reflux. Discussed diet and lifestyle changes. Also, bilateral cerumen impactions cleared without difficulty.

## 2021-06-16 PROBLEM — R74.8 ELEVATED AMYLASE AND LIPASE: Status: ACTIVE | Noted: 2018-06-20

## 2021-06-16 PROBLEM — Z12.11 SPECIAL SCREENING FOR MALIGNANT NEOPLASMS, COLON: Status: ACTIVE | Noted: 2020-06-01

## 2021-06-16 PROBLEM — E03.9 HYPOTHYROID: Status: ACTIVE | Noted: 2018-04-13

## 2021-06-16 PROBLEM — M81.0 OSTEOPOROSIS: Status: ACTIVE | Noted: 2018-04-13

## 2021-06-16 NOTE — TELEPHONE ENCOUNTER
I called the pt, her tooth shifted and she needs an extraction. We will have this now and I will call her in 1 mo to get an update on when she had her extraction and when she had/will have the implant.

## 2021-06-16 NOTE — TELEPHONE ENCOUNTER
[facility] Team      Pt has questions, she has a prolia later today @ 3:30pm. doesnt know if she should get injection.    She can be reached @ [phone]

## 2021-06-17 NOTE — PROGRESS NOTES
ASSESSMENT/PLAN:  1. Abdominal pain  - Comprehensive Metabolic Panel  - Lipase  - CT Abdomen Pelvis With Oral With IV Contrast; Future  - Amylase  - Hepatitis C Antibody (Anti-HCV)  - Ambulatory referral to Gastroenterology    2. Hair loss  - Cortisol    3. Joint pain    - Antinuclear Antibody (INDY) Cascade  - CK Total  - Sedimentation Rate    4. Loss of sense of smell    - MR Brain Without Contrast; Future    5. Headache    - MR Brain Without Contrast; Future    58-year-old female with a constellation of symptoms including epigastric abdominal pain and bloating, joint pain, new headaches and loss of sense of smell.  It is difficult to link this to 1 underlying etiology.  We will do workup with several different systems to find a cause.  Labs did come back with elevated lipase although CT scan was normal.  She is referred to GI for further discussions.  Cannot explain her hair loss she has a recent normal thyroid.  We will check cortisol levels, MRI scan due to her headaches and loss of sense of smell and rheumatology.    There are no Patient Instructions on file for this visit.    Orders Placed This Encounter   Procedures     CT Abdomen Pelvis With Oral With IV Contrast     Standing Status:   Future     Number of Occurrences:   1     Standing Expiration Date:   4/26/2019     Order Specific Question:   Is the patient pregnant?     Answer:   No     Order Specific Question:   Can the procedure be changed per Radiologist protocol?     Answer:   Yes     Order Specific Question:   If this is a diagnostic procedure, have the patient's age and recent imaging history been considered?     Answer:   Yes     MR Brain Without Contrast     Standing Status:   Future     Standing Expiration Date:   4/30/2019     Order Specific Question:   Reason for Exam (Describe Symptoms):     Answer:   new headaches, frontal with loss of sense of smell.     Order Specific Question:   Is the patient pregnant?     Answer:   No     Order Specific  Question:   Can the procedure be changed per Radiologist protocol?     Answer:   Yes     Order Specific Question:   If this is a diagnostic procedure, have the patient's age and recent imaging history been considered?     Answer:   Yes     Order Specific Question:   Is the patient claustrophobic and in need of sedation to complete their MR scan?     Answer:   No     Comprehensive Metabolic Panel     Lipase     Cortisol     Amylase     Antinuclear Antibody (INDY) Cascade     CK Total     Sedimentation Rate     Hepatitis C Antibody (Anti-HCV)     Ambulatory referral to Gastroenterology     Referral Priority:   Routine     Referral Type:   Consultation     Referral Reason:   Evaluation and Treatment     Requested Specialty:   Gastroenterology     Number of Visits Requested:   1     There are no discontinued medications.    No Follow-up on file.    CHIEF COMPLAINT;  Chief Complaint   Patient presents with     Abdominal Pain     and bloating, weight gain, hair falling out, yellow complexion, wants to discuss getting labs       HISTORY OF PRESENT ILLNESS:  Katina is a 58 y.o. female presenting to the clinic today with multiple concerns.    Abdominal Pain: She endorses weight gain, abdominal pain and distention, and bloating. Her abdominal pain and distention does not bother her if she does not eat. She does request a CT of her abdomen. She has taken OTC Tums which improved her symptoms for about 45 minutes, but her symptoms did return.     Thyroid Changes: She saw endocrinology 4/13/18 after a routine bone DEXA scan showed osteoporosis. Her TSH at that visit was 2.17 and PTH was 112. She was prescribed levothyroxine 25 mcg, but has not started this medication given that her TSH was normal. She states that the endocrinologist mentioned having a parathyroid study and ultrasound done. She endorses fatigue, hair loss, chills, difficulty sleeping, headaches, and hoarseness. She endorses joint pain in her hands and neck when she  gets up.     REVIEW OF SYSTEMS:  Her liver enzymes were elevated previously. She would like recheck of her hepatic profile. She states that her coworkers mentioned that she looked more yellow and jaundiced. She has not had a menstrual period since she was 42 years old. She exercises regularly. All other systems are negative.    PFSH:  Reviewed, as below.    TOBACCO USE:  History   Smoking Status     Never Smoker   Smokeless Tobacco     Never Used       VITALS:  Vitals:    04/26/18 1509   BP: 100/68   Pulse: (!) 52   Resp: 16   Temp: 97.8  F (36.6  C)   Weight: 107 lb (48.5 kg)     Wt Readings from Last 3 Encounters:   04/26/18 107 lb (48.5 kg)   04/13/18 107 lb 8 oz (48.8 kg)   01/25/18 105 lb 6.4 oz (47.8 kg)     Body mass index is 19.57 kg/(m^2).    PHYSICAL EXAM:  GENERAL APPEARANCE: Alert, cooperative, no distress, appears stated age  HEAD: Normocephalic, without obvious abnormality, atraumatic  ABDOMEN: Soft, no masses, no organomegaly; tenderness to palpation in mid-epigastrium  NEUROLOGIC: CNII-XII intact.     RECENT RESULTS  No results found for this or any previous visit (from the past 48 hour(s)).      ADDITIONAL HISTORY SUMMARIZED (2): Endocrinology note TSH 2.17 and , recommended levothyroxine.  DECISION TO OBTAIN EXTRA INFORMATION (1): None.  RADIOLOGY TESTS (1): CT ordered.  LABS (1): Labs ordered.  MEDICINE TESTS (1): None.  INDEPENDENT REVIEW (2 each): None.    The visit lasted a total of 17 minutes face to face with the patient. Over 50% of the time was spent counseling and educating the patient about hair loss, fatigue, abdominal pain, and requested lab work.    IDotty, am scribing for and in the presence of, Dr. Cervantes.    I, Dr. Cervantes, personally performed the services described in this documentation, as scribed by Dotty Manjarrez in my presence, and it is both accurate and complete.    MEDICATIONS:  Current Outpatient Prescriptions   Medication Sig Dispense Refill      ascorbic acid-vitamin E-biotin (HAIR, SKIN, NAILS WITH BIOTIN) 7.5-7.5-1,250 mg-unit-mcg Chew Chew 2 capsules daily.       aspirin 81 MG EC tablet Take 81 mg by mouth daily.       cholecalciferol, vitamin D3, 1,000 unit Chew Chew 2,000 Units daily.       conjugated estrogens (PREMARIN) vaginal cream 1/2 gm vaginally daily as needed 42.5 g 12     cyanocobalamin, vitamin B-12, 1,000 mcg Subl Place 1,000 mcg under the tongue daily.       multivit with min-folic acid 200 mcg Chew Chew 2 capsules daily.       naproxen-diphenhydramine (ALEVE PM) 220-25 mg Tab Take 1 tablet by mouth daily as needed.       hydrocortisone-pramoxine (ANALPRAM-HC) 2.5-1 % rectal cream Insert into the rectum 3 (three) times a day. 28.35 g 3     levothyroxine (SYNTHROID, LEVOTHROID) 25 MCG tablet Take 1 tablet (25 mcg total) by mouth daily. 90 tablet 1     No current facility-administered medications for this visit.        Total data points: 4

## 2021-06-17 NOTE — PROGRESS NOTES
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
## 2021-06-18 NOTE — LETTER
Letter by Guido Lee MD at      Author: Guido Lee MD Service: -- Author Type: --    Filed:  Encounter Date: 1/16/2019 Status: (Other)       1/16/2019    Appeal      RE: Katina Hoffman  YOB: 1960  Case Number: [id number]  Date of Claim: 12/24/2018  Policy Number: [id number]      To Whom It May Concern:    Please accept this letter as [facility] appeal on behalf of patient Katina Hoffman for prolia 60mg/ml injection.     The patient tried Fosamax and Actonel before and she had severe acid reflux.    Based on this information, we are asking that you reconsider your previous decision and allow for coverage for the denied procedure code J098, CPT# 08037. Should you require additional information, please do not hesitate to contact [employer] at phone number [phone].  If a decision is made to uphold the previous denial please direct us to or provide us with your insurance policy supporting the denial.    Sincerely,      Guido Lee

## 2021-06-19 NOTE — LETTER
Letter by Padmini Cervantes MD at      Author: Padmini Cervantes MD Service: -- Author Type: --    Filed:  Encounter Date: 11/7/2019 Status: Signed         Katina Hoffman  [address] [facility] [address]      November 7, 2019      Dear Katina    In reviewing your records, we have determined a gap in your preventive services. Based on your age and health history, we recommend the follow service.     ? General Physical  ? Immunizations  ? Lab work      If you have had the service elsewhere, please contact us so we can update our records. Please let us know if you have transferred your care to another clinic.    Please call [phone] to schedule this appointment.    We believe that a strong preventive care program, including regular physicals and follow-up care is an important part of a healthy lifestyle and we are committed to helping you maintain your health.    Thank you for choosing us as your health care provider.    Sincerely,     [facility]

## 2021-06-19 NOTE — LETTER
Letter by Christelle Gimenez MD at      Author: Christelle Gimenez MD Service: -- Author Type: --    Filed:  Encounter Date: 10/6/2019 Status: Signed         October 6, 2019     Patient: Katina Hoffman   YOB: 1960   Date of Visit: 10/6/2019       To Whom It May Concern:    It is my medical opinion that Katina Hoffman should remain out of work through 10/9/19.    If you have any questions or concerns, please don't hesitate to call.    Sincerely,        Electronically signed by Christelle Martinez MD

## 2021-06-19 NOTE — LETTER
Letter by Eva Almeida MD at      Author: Eva Almeida MD Service: -- Author Type: --    Filed:  Encounter Date: 5/13/2019 Status: (Other)         May 13, 2019     Patient: Katina Hoffman   YOB: 1960   Date of Visit: 5/13/2019       To Whom it May Concern:    Katina Hoffman was seen in my clinic on 5/13/2019.    If you have any questions or concerns, please don't hesitate to call.    Sincerely,         Electronically signed by Eva Almeida MD

## 2021-06-19 NOTE — LETTER
Letter by Padmini Cervantes MD at      Author: Padmini Cervantes MD Service: -- Author Type: --    Filed:  Encounter Date: 10/8/2019 Status: Signed         October 8, 2019     Patient: Katina Hoffman   YOB: 1960   Date of Visit: 10/8/2019       To Whom it May Concern:    Katina Hoffman was seen in my clinic on 10/8/2019.  She will unable to work until 10/16/2019.    If you have any questions or concerns, please don't hesitate to call.    Sincerely,         Electronically signed by Padmini Cervantes MD
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
## 2021-06-21 NOTE — PROGRESS NOTES
[facility] Progress Note    Assessment:  1.  Possible TIA as outlined in 2017.  She had difficulty seeing several words for approximately 40 minutes.  She continues to take aspirin.  She does have a follow-up with Dr. Álvarez next week.  Question if this represents atypical migraine.    2.  Atypical left arm discomfort.  This is resolved.  She had a negative stress echocardiogram a number of months ago.  Had a CT calcium score that was 0 in August 2018.    3.  Intermittent dizziness with bradycardic tendency.  I suspect that the bradycardic tendency is related to conditioning.  She would likely has some component of mild basal depressor headedness.  We talked about keeping herself well-hydrated and changing positions slowly  Plan: Follow-up 1 year or sooner if specific issues were to arise    1. Sinus bradycardia     2. Dizziness - 15 seconds to 4 hoiurs - episodic - no provocatory maneuvers           An After Visit Summary was printed and given to the patient.    Subjective:    Katina Hoffman is a 58 y.o. female who returned for a planned  follow up visit.  She reports that in the interim she has been feeling well.  She has occasional momentary lightheadedness but has had no profound dizziness or lightheadedness.  She does have bradycardic tendency which I suspect is related to her conditioning.  I suspect that she does have some mild component of vasodepressive syncope.  She wore the event monitor for 2 weeks that demonstrated heart rates ranging from 40s to 140 bpm however there was no significant pauses.    Review of Systems:   General: WNL  Eyes: WNL  Ears/Nose/Throat: WNL  Lungs: WNL  Heart: Irregular Heartbeat  Stomach: WNL  Bladder: WNL  Muscle/Joints: WNL  Skin: WNL  Nervous System: WNL  Mental Health: WNL     Blood: WNL      Problem List:    Patient Active Problem List   Diagnosis     Abnormal Weight Loss     Chest Pain     Vitamin D Deficiency     Anxiety     Foot Pain (Soft Tissue)      Fatigue     Head External Swelling Occipital Left Side (___ Cm)     Abdominal Pain In The Central Upper Belly (Epigastric)     Serum Enzyme Levels - Lipase Elevated     Dizziness - 15 seconds to 4 hoiurs - episodic - no provocatory maneuvers     Cerumen Impaction     Earache (Symptom)     Tendonitis Of The Achilles Tendon     Tendonitis     Exostosis - left anterolateral skull - no change 2005 ---> 2013 - perceived as enlarging on 12/5/14     Right flank painv- onset about 11/5/14 - no injury     Visual field cut     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     Insomnia     Sinus bradycardia     Osteoporosis     Hypothyroid       Social History     Socioeconomic History     Marital status:      Spouse name: Not on file     Number of children: Not on file     Years of education: Not on file     Highest education level: Not on file   Social Needs     Financial resource strain: Not on file     Food insecurity - worry: Not on file     Food insecurity - inability: Not on file     Transportation needs - medical: Not on file     Transportation needs - non-medical: Not on file   Occupational History     Not on file   Tobacco Use     Smoking status: Never Smoker     Smokeless tobacco: Never Used   Substance and Sexual Activity     Alcohol use: Yes     Alcohol/week: 1.2 oz     Types: 2 Glasses of wine per week     Drug use: No     Sexual activity: Not on file   Other Topics Concern     Not on file   Social History Narrative     Not on file       Family History   Problem Relation Age of Onset     Breast cancer Maternal Aunt 65     Breast cancer Paternal Aunt         Unsure of age       Current Outpatient Medications   Medication Sig Dispense Refill     ascorbic acid-vitamin E-biotin (HAIR, SKIN, NAILS WITH BIOTIN) 7.5-7.5-1,250 mg-unit-mcg Chew Chew 2 capsules daily.       aspirin 81 MG EC tablet Take 81 mg by mouth daily.       cholecalciferol, vitamin D3, 1,000 unit Chew Chew 2,000 Units daily.       conjugated estrogens  "(PREMARIN) vaginal cream 1/2 gm vaginally daily as needed 42.5 g 12     cyanocobalamin, vitamin B-12, 1,000 mcg Subl Place 1,000 mcg under the tongue daily.       hydrocortisone-pramoxine (ANALPRAM-HC) 2.5-1 % rectal cream Insert into the rectum 3 (three) times a day. 28.35 g 3     ketorolac (TORADOL) 10 mg tablet Take 10 mg by mouth daily as needed.       levothyroxine (SYNTHROID, LEVOTHROID) 25 MCG tablet Take 1 tablet (25 mcg total) by mouth daily. 90 tablet 1     multivit with min-folic acid 200 mcg Chew Chew 2 capsules daily.       naproxen-diphenhydramine (ALEVE PM) 220-25 mg Tab Take 1 tablet by mouth daily as needed.       risedronate (ACTONEL) 35 MG tablet Take 1 tablet (35 mg total) by mouth every 7 days. with water on empty stomach, nothing by mouth or lie down for next 30 minutes. 5 tablet 5     triamcinolone (KENALOG) 0.1 % cream Apply 1 application topically daily as needed.  1     No current facility-administered medications for this visit.        Objective:     BP 98/50 (Patient Site: Left Arm, Patient Position: Sitting, Cuff Size: Adult Regular)   Pulse (!) 43   Resp 10   Ht 5' 2\" (1.575 m)   Wt 106 lb (48.1 kg)   BMI 19.39 kg/m    106 lb (48.1 kg)       Physical Exam:    GENERAL APPEARANCE: alert, no apparent distress  HEENT: no scleral icterus or xanthelasma  NECK: jugular venous pressure is in normal limits  CHEST: symmetric, the lungs are clear to auscultation  CARDIOVASCULAR: regular rhythm without murmur, click, or gallop; no carotid bruits  Abdomen: No Organomegaly, masses, bruits, or tenderness. Bowels sounds are present      EXTREMITIES: no cyanosis, clubbing or edema    Cardiac Testing:  Interpretation Summary       Left ventricle ejection fraction is normal. The estimated left ventricular ejection fraction is 55%. No wall motion abnormality.    Good exercise tolerance with no chest pain reported.    The stress electrocardiogram is negative for inducible ischemic EKG " changes.    Stress echocardiogram is negative for inducible ischemia.      Impression:    Borderline multi-day single-lead patch monitor by virtue of the apparent lack of chronotropic response to activities of daily living.  Many of the patient's daily heart rate averages range between the mid to low 40s and 60 bpm.    The patient had multiple symptomatic recordings of lightheadedness and dizziness however all episodes corresponded to sinus rhythm.  The fact that the heart rates ranged anywhere from 40 bpm up to 140 bpm at times with significant variability during a given recording suggests a possible autonomic source for the patient's symptoms i.e. vasovagal presyncope.    No sustained atrial or ventricular tachyarrhythmia.    No profound bradycardia or pauses.      Lab Results:    Lab Results   Component Value Date     04/26/2018    K 4.4 04/26/2018     04/26/2018    CO2 26 04/26/2018    BUN 14 04/26/2018    CREATININE 0.80 04/26/2018    CALCIUM 10.6 (H) 04/26/2018     Lab Results   Component Value Date    CHOL 168 04/06/2017    TRIG 47 04/06/2017    HDL 62 04/06/2017     No results found for: BNP  Creatinine (mg/dL)   Date Value   04/26/2018 0.80   04/13/2018 0.68   04/06/2017 0.70   04/05/2017 0.70     LDL Calculated (mg/dL)   Date Value   04/06/2017 97   06/22/2011 116     Lab Results   Component Value Date    WBC 4.1 04/05/2017    WBC 3.8 (L) 12/05/2014    HGB 13.2 04/05/2017    HCT 38.9 04/05/2017    MCV 93 04/05/2017     04/05/2017               This note has been dictated using voice recognition software. Any grammatical or context distortions are unintentional and inherent to the software.      Kun Rodriguez M.D., F.A.C.C.  [facility]  [phone]

## 2021-06-21 NOTE — LETTER
Letter by Katelyn Macias NP at      Author: Katelyn Macias NP Service: -- Author Type: --    Filed:  Encounter Date: 4/6/2021 Status: (Other)         Katina Hoffman  [address] [place] [id number]             April 6, 2021         Dear Ms. Hoffman,    Below are the results from your recent visit:    Resulted Orders   DXA Bone Density Scan    Narrative    4/5/2021      RE: Katina Hoffman  YOB: 1960        Dear Kateyln Macias,    Patient Profile:  60 y.o. female, postmenopausal, is here for the follow up bone density   test.   History of fractures - Yes;  Ribs and Shoulder. Family history of   osteoporosis - Yes;  mother.  Family history of hip fracture: None.   Smoking history - No. Osteoporosis treatment past -  Yes;    Bisphosphonates. Osteoporosis treatment current - Yes;  Prolia.  Chronic   medical problems - None. High risk medications -  None.      Assessment:    1. The spine bone density L1-L4 with T-score -1.8.  2. Femoral bone densities show left femoral neck T- score -2.1 and right   femoral neck T-score -1.8.  3. Trabecular bone score indicates good trabecular bone architecture.      60 y.o. female with LOW BONE DENSITY (OSTEOPENIA) and MODERATE fracture   risk, adjusted for the TBS, with major osteoporotic fracture risk 13.1%   and hip fracture risk 1.8%.     Since the previous bone density dated January 30, 2020, there has been a   +3.8% change in the bone density of the spine.  Additionally there has   been no statistically significant % change in the hips bilaterally.  An   increase in/or stability is considered a positive response to treatment.    Since his baseline bone density in 2017, there has been an overall 9%   increase in bone density of the lumbar spine.  Additionally, there has   been an overall increase of 5.9% in the left total hip and 6.3% in the   right total hip.    Recommendations:  Appropriate calcium, vitamin D supplements, along with balance and weight  "  bearing exercise recommended.  Additionally, the transition from Prolia to   an oral antiresorptive agent can be considered with follow up bone density   scan in 1 to 2 years.      Bone densitometry was performed on your patient using our Active Storage iDXA   densitometer. The results are summarized and a copy of the actual scans   are included for your review. In conformity with the International Society   of Clinical Densitometry's most recent position statement for DXA   interpretation (2015), the diagnosis will be made on the lowest measured   T-score of the lumbar spine, femoral neck, total proximal femur or 33%   radius. Note the change in terminology for diagnostic classification from   OSTEOPENIA to LOW BONE MASS. All trending for sequential exams will be   done using multiple vertebrae or the total proximal femur. Fracture risk   is based on the WHO Fracture Risk Assessment Tool (FRAX). If additional   information is needed or if you would like to discuss the results, please   do not hesitate to call me.       Thank you for referring this patient to [facility]. We are happy to be of service in support of you and your   practice. If you have any questions or suggestions to improve our service,   please call me at [phone].     Sincerely,     LAKEISHA Coronado.  [facility]     The test results show that your current treatment is working, and I wanted to send you \"official\" notification.  You were too fast! ;) Please continue your current medication and plan. We recommend that you repeat the above test(s) in 2 years.    Please call with questions or contact us using [employer].    Sincerely,        Electronically signed by Katelyn Macias NP       "

## 2021-06-21 NOTE — LETTER
Letter by Padmini Cervantes MD at      Author: Padmini Cervantes MD Service: -- Author Type: --    Filed:  Encounter Date: 1/12/2021 Status: (Other)         Katina Hoffman  [address] [place] [id number]             January 12, 2021         Dear Ms. Hoffman,    Below are the results from your recent visit:    Resulted Orders   Comprehensive Metabolic Panel   Result Value Ref Range    Sodium 141 136 - 145 mmol/L    Potassium 4.8 3.5 - 5.0 mmol/L    Chloride 104 98 - 107 mmol/L    CO2 25 22 - 31 mmol/L    Anion Gap, Calculation 12 5 - 18 mmol/L    Glucose 84 70 - 125 mg/dL    BUN 20 8 - 22 mg/dL    Creatinine 0.75 0.60 - 1.10 mg/dL    GFR MDRD Af Amer >60 >60 mL/min/1.73m2    GFR MDRD Non Af Amer >60 >60 mL/min/1.73m2    Bilirubin, Total 1.1 (H) 0.0 - 1.0 mg/dL    Calcium 9.2 8.5 - 10.5 mg/dL    Protein, Total 6.6 6.0 - 8.0 g/dL    Albumin 3.9 3.5 - 5.0 g/dL    Alkaline Phosphatase 67 45 - 120 U/L    AST 34 0 - 40 U/L    ALT 20 0 - 45 U/L    Narrative    Fasting Glucose reference range is 70-99 mg/dL per  American Diabetes Association (ADA) guidelines.   HM2(CBC w/o Differential)   Result Value Ref Range    WBC 4.2 4.0 - 11.0 thou/uL    RBC 4.22 3.80 - 5.40 mill/uL    Hemoglobin 13.2 12.0 - 16.0 g/dL    Hematocrit 39.5 35.0 - 47.0 %    MCV 93 80 - 100 fL    MCH 31.3 27.0 - 34.0 pg    MCHC 33.5 32.0 - 36.0 g/dL    RDW 12.9 11.0 - 14.5 %    Platelets 241 140 - 440 thou/uL    MPV 7.2 7.0 - 10.0 fL   Thyroid Cascade   Result Value Ref Range    TSH 3.15 0.30 - 5.00 uIU/mL   Ferritin   Result Value Ref Range    Ferritin 11 10 - 130 ng/mL   COVID-19 Virus (Coronavirus) Antibody & Titer Reflex   Result Value Ref Range    COVID-19 Antibody Screen Positive       Comment:      Antibodies to COVID-19 detected, which may be due to past or current infection.    COVID-19 IgG Titer 1:3200       Comment:      Qualitative screen for total antibodies to COVID-19 (SARS-CoV-2) with  semi-quantitative measurement of IgG COVID-19  antibodies by endpoint titer.  COVID-19 antibodies may be elevated due to a past or current infection.  Negative results do not rule out COVID-19 infection.  Results from antibody  testing should not be used as the sole basis to diagnose or exclude SARS-CoV-2  infection or to inform infection status.  COVID-19 PCR test should be ordered  if current infection is suspected.  False positive results may occur in rare  cases due to cross-reacting antibodies.  This test was developed and its performance characteristics determined by the  [facility] ([facility]),  which is regulated under CLIA as qualified to perform high-complexity testing.  This test has not been reviewed by the FDA.  Testing performed by [facility], [facility], [address], [address], [place]  [address]   Amylase   Result Value Ref Range    Amylase 158 (H) 5 - 120 U/L   Lipase   Result Value Ref Range    Lipase 109 (H) 0 - 52 U/L       Here is a copy of your labs.  Your amylase and lipase are stable, and continue to be elevated.  Let me know if you are having abdominal discomfort and we will do an ultrasound.  Your ferritin levels suggest you may have a mild iron deficiency- increase dietary iron or take a supplement.  Finally your Covid antibodies are positive.    Please call with questions or contact us using [employer].    Sincerely,        Electronically signed by Padmini Cervantes MD

## 2021-06-22 NOTE — TELEPHONE ENCOUNTER
Abdoul w/[employer] calling. PA has been denied. Rational and appeal rights will be fax. Please watch for fax.  He is able to provide the information verbally as well.     Abdoul @ [phone]
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
## 2021-06-23 NOTE — TELEPHONE ENCOUNTER
Chrystal from [employer] called and need last ofv note and dexa result.   P# [phone], F# [phone]    Records faxed.     Will wait for respond.

## 2021-06-23 NOTE — TELEPHONE ENCOUNTER
Letter received in the mailed. PA for prolia denied.   Appeal started per Dr. Lee note what pt had failed.     The patient tried Fosamax and Actonel before and she had severe acid reflux.    Appeal faxed to [phone]

## 2021-06-23 NOTE — TELEPHONE ENCOUNTER
Abdoul called back. He Said he doesn't handle the faxing but will send a msg to the tech. To re-faxed the letter. Abdoul batres for further question we can call Emily @ [phone].   We will wait for the fax.

## 2021-06-23 NOTE — TELEPHONE ENCOUNTER
LVMTCB.     We have not received denied letter yet. Please let Abdoul know to re-fax to us again.

## 2021-06-23 NOTE — TELEPHONE ENCOUNTER
Pending prolia injection schedule for 02.21.2019.    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 She's not sure how it'll work with her insurance and her pharmacy benefits, but if it's an option she would like to self injected. She will look further in to it and will call with outcome.

## 2021-06-23 NOTE — PROGRESS NOTES
Assessment and Plan:     1. External hemorrhoids  Will treat with proctosol cream as needed.  Educated on its indications and side effects. Discussed keeping bowel movements soft.  She is to avoid straining with bowel movements.  If symptoms persist, will refer to colorectal specialist.   - hydrocortisone (PROCTOSOL HC) 2.5 % rectal cream; Apply to the affected area twice daily as needed.  Dispense: 28.35 g; Refill: 0    2. Neck pain  She continues to see orthopedics.  She takes Ketorolac when Ibuprofen does not provide assistance.  I discouraged the use of concurrent NSAID's.  She is to take this with food.    - ketorolac (TORADOL) 10 mg tablet; Take 1 tablet (10 mg total) by mouth every 6 (six) hours as needed.  Dispense: 30 tablet; Refill: 0    3. Paresthesia  She does not appear to have Bell's palsy and does not have symptoms typical of trigeminal neuralgia.  She denies facial pain.  Will monitor her symptoms over the next few days as her symptoms have slowly been improving.  She likely has some nerve irritation from the dental work that was recently performed.  Discussed concerning symptoms including worsening facial numbness, facial drooping, numbness and tingling of her extremities, muscle weakness, blurred vision and headache.  If these occur, suggest ER evaluation.  She is content with the plan.     Subjective:     Katina is a 58 y.o. female presenting to the clinic for multiple concerns today.  Last Wednesday, she was having dental work performed.  The dentist injected Novocaine into her lower gums and immediately, she felt numbness in her right cheek.  This extended to her right eyelid and around her right ear.  Patient felt as though her right eyelid was droopy.  She has asked multiple individuals if her right eyelid is droopy and they said no despite her having this sensation.  Patient states her symptoms are improving.  She does continue to experience mild numbness around her ear.  She is concerned she  may need some prednisone.  She denies any facial pain. Patient is also concerned of an external rectal hemorrhoid which has been prominent over the past month.  It has been increasing in size.  She has a burning sensation with bowel movements.  She has not noticed any blood in her stool or on the toilet paper.  She has been using Tucks.  She has a history of constipation and does occasionally strain to have bowel movements.  Lastly, patient has chronic neck and left shoulder pain.  She has been seeing orthopedics.  She has been diagnosed with osteoarthritis.  She typically takes ibuprofen for flares but was prescribed Toradol by the orthopedic specialist for when the ibuprofen does not work.  She requests a refill of this today.    Review of Systems: A complete 14 point review of systems was obtained and is negative or as stated in the history of present illness.    Social History     Socioeconomic History     Marital status:      Spouse name: Not on file     Number of children: Not on file     Years of education: Not on file     Highest education level: Not on file   Social Needs     Financial resource strain: Not on file     Food insecurity - worry: Not on file     Food insecurity - inability: Not on file     Transportation needs - medical: Not on file     Transportation needs - non-medical: Not on file   Occupational History     Not on file   Tobacco Use     Smoking status: Never Smoker     Smokeless tobacco: Never Used   Substance and Sexual Activity     Alcohol use: Yes     Alcohol/week: 1.2 oz     Types: 2 Glasses of wine per week     Drug use: No     Sexual activity: Not on file   Other Topics Concern     Not on file   Social History Narrative     Not on file       Active Ambulatory Problems     Diagnosis Date Noted     Abnormal Weight Loss      Chest Pain      Vitamin D Deficiency      Anxiety      Foot Pain (Soft Tissue)      Fatigue      Head External Swelling Occipital Left Side (___ Cm)       "Abdominal Pain In The Central Upper Belly (Epigastric)      Serum Enzyme Levels - Lipase Elevated      Dizziness - 15 seconds to 4 hoiurs - episodic - no provocatory maneuvers      Cerumen Impaction      Earache (Symptom)      Tendonitis Of The Achilles Tendon      Tendonitis      Exostosis - left anterolateral skull - no change 2005 ---> 2013 - perceived as enlarging on 12/5/14 12/05/2014     Right flank painv- onset about 11/5/14 - no injury 12/05/2014     Visual field cut 04/05/2017     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 04/05/2017     Insomnia      Sinus bradycardia      Osteoporosis 04/13/2018     Hypothyroid 04/13/2018     Resolved Ambulatory Problems     Diagnosis Date Noted     No Resolved Ambulatory Problems     Past Medical History:   Diagnosis Date     Abnormal Pap smear of cervix 11/21/2017     Exostosis - left anterolateral skull - no change 2005 ---> 2013 - perceived as enlarging on 12/5/14 12/5/2014       Family History   Problem Relation Age of Onset     Breast cancer Maternal Aunt 65     Breast cancer Paternal Aunt         Unsure of age       Objective:     BP 92/60   Pulse (!) 43   Ht 5' 2\" (1.575 m)   Wt 101 lb 8 oz (46 kg)   SpO2 97%   BMI 18.56 kg/m      Patient is alert, in no obvious distress.   Skin: Warm, dry.  No lesions or rashes.  Skin turgor rapid return.   HEENT:  Head normocephalic, atraumatic.  Eyes normal.  PERRL.  EOM's intact.  No nystagmus. Ears normal.  Nose patent, mucosa pink.  Oropharynx mucosa pink.  No lesions or tonsillar enlargement.   Neck: Supple, no lymphadenopathy.  Lungs:  Clear to auscultation. Respirations even and unlabored.  No wheezing or rales noted.   Heart:  Regular rate and rhythm.  No murmurs, S3, S4, gallops, or rubs.    Abdomen: Soft, nontender.  No organomegaly. Bowel sounds normoactive. No guarding or masses noted.   Rectal exam shows a small external rectal hemorrhoid which is non-thrombosed.   Musculoskeletal:  Full ROM of extremities including " her neck.  DTRs symmetrical, sensations intact.  No obvious deformity.  Muscle strength equal +5/5.   Neurological:  Cranial nerves 2-12 intact.  She is able to puff out of her cheeks, wrinkle her forehead, close her eyelids tight without difficulty.  Sensations in the face are intact and symmetrical.

## 2021-06-23 NOTE — TELEPHONE ENCOUNTER
Received letter from [employer]. Appeal approved for prolia.   From 1/23/2019-1/23/2021.  Appeal # [id number], case # [id number]. Paperwork sent to scan.     Please call pt to devin prolia injection.

## 2021-06-24 NOTE — TELEPHONE ENCOUNTER
Pt was in for prolia today, she is an RN at [employer] and would like to know if we can send in an RX to the [facility] pharmacy in our building so she can inject the medication herself every 6 months. I informed that I will ask the MD and call her at work at [phone] between 7-330.

## 2021-06-24 NOTE — PROGRESS NOTES
HPI: This patient presents to clinic today for cerumen removal. Has noticed some fullness of the ears and muffled hearing, but denies otalgia, otorrhea, vertigo, change in true hearing or tinnitus. Denies other symptoms.    Past medical history, surgical history, family history, social history, medications, and allergies have been reviewed and are documented above.     Review of Systems: a 10-system review was performed. Symptoms are noted in the HPI and on a scanned document in the computer chart.    Physical Examination:   GEN: no acute distress, alert and oriented  EYES: extraocular movements intact, pupils equal and round. Sclera clear.   EARS: bilateral cerumen impactions cleared under binocular microscopy using suction/loop/forceps. The tympanic membranes are noted to be intact and clear with no signs of infections, effusions, perforations, or retractions.  PULM: breathing comfortably on room air with no stertor or stridor. Chest expansion symmetric.  CARDS: no cyanosis or clubbing, normal carotid pulses    MEDICAL DECISION-MAKING: cerumen impactions cleared under binocular microscopy without difficulty. Hearing restored to baseline. Follow-up PRN.

## 2021-06-24 NOTE — PROGRESS NOTES

## 2021-06-25 NOTE — PROGRESS NOTES
Progress Notes by Kun Rodriguez MD at 8/14/2017 11:30 AM     Author: Kun Rodriguez MD Service: -- Author Type: Physician    Filed: 11/22/2017  6:47 PM Encounter Date: 8/14/2017 Status: Addendum    : Kun Rodriguez MD (Physician)    Related Notes: Original Note by Kun Rodriguez MD (Physician) filed at 8/14/2017 12:40 PM       [facility] Progress Note    Assessment:  1.  Possible TIA a number of months ago without recurrent symptoms.  She follows with neurology as well with a brain MRI that was negative.  Blood cultures ×2 were negative.  She had evidence for l Lambl's excrescence repeat echocardiogram appeared to be less evidence of this finding.  We are going to plan a repeat echocardiogram towards the end of the year November 2017 and she will continue on the daily aspirin.    2.  Intolerant to statin with an LDL cholesterol that was quite reasonable prior to initiation of statin LDL was 97.  Prior cardiologist recommended CT calcium scoring but this was not followed up or pursued.  This was discussed with her last time she continues to want to continue with aggressive risk factor modification which she does an excellent job.  She did have a carotid CTA to demonstrated no aneurysm or significant stenosis and no reported plaque recently.    3.  Workup of upper coagulable state as outlined in Dr. Fan note.  She is reportedly hetorzygous for MTHFR mutation.        Plan: As outlined above with plan follow-up in 6 months.    1. TIA (transient ischemic attack)  Echo Complete         An After Visit Summary was printed and given to the patient.    Subjective:    Katina Hoffman is a 57 y.o. female who returned for a planned  follow up visit.  She reports feeling well and she is back to her usual exercise routine.  I have reviewed follow-up from Dr. Fan note.  She has been feeling well and has had no specific cardiovascular complaints or concerns.  She denies chest pain, shortness of  breath, additional neurologic issues or events.  She is not on statin and she was intolerant to statin had been recommended by neurology.  Her LDL prior to initiation of statin was 97.    Review of Systems:   General: WNL  Eyes: WNL  Ears/Nose/Throat: WNL  Lungs: WNL  Heart: Irregular Heartbeat (palpitations)  Stomach: WNL  Bladder: WNL  Muscle/Joints: WNL  Skin: WNL  Nervous System: WNL  Mental Health: WNL     Blood: WNL      Problem List:    Patient Active Problem List   Diagnosis   ? Abnormal Weight Loss   ? Chest Pain   ? Vitamin D Deficiency   ? Anxiety   ? Foot Pain (Soft Tissue)   ? Fatigue   ? Head External Swelling Occipital Left Side (___ Cm)   ? Abdominal Pain In The Central Upper Belly (Epigastric)   ? Serum Enzyme Levels - Lipase Elevated   ? Dizziness - 15 seconds to 4 hoiurs - episodic - no provocatory maneuvers   ? Cerumen Impaction   ? Earache (Symptom)   ? Tendonitis Of The Achilles Tendon   ? Tendonitis   ? Exostosis - left anterolateral skull - no change 2005 ---> 2013 - perceived as enlarging on 12/5/14   ? Right flank painv- onset about 11/5/14 - no injury   ? Visual field cut   ? 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 Insomnia   ? Sinus bradycardia       Social History     Social History   ? Marital status:      Spouse name: N/A   ? Number of children: N/A   ? Years of education: N/A     Occupational History   ? Not on file.     Social History Main Topics   ? Smoking status: Never Smoker   ? Smokeless tobacco: Never Used   ? Alcohol use 1.2 oz/week     2 Glasses of wine per week   ? Drug use: No   ? Sexual activity: Not on file     Other Topics Concern   ? Not on file     Social History Narrative       Family History   Problem Relation Age of Onset   ? Breast cancer Maternal Aunt 65   ? Breast cancer Paternal Aunt        Current Outpatient Prescriptions   Medication Sig Dispense Refill   ? ascorbic acid-vitamin E-biotin (HAIR, SKIN, NAILS WITH BIOTIN) 7.5-7.5-1,250 mg-unit-mcg Chew  "Chew 2 capsules daily.     ? aspirin 325 MG EC tablet Take 1 tablet (325 mg total) by mouth daily.  0   ? cholecalciferol, vitamin D3, 1,000 unit Chew Chew 2,000 Units daily.     ? cyanocobalamin, vitamin B-12, 1,000 mcg Subl Place 1,000 mcg under the tongue daily.     ? multivit with min-folic acid 200 mcg Chew Chew 2 capsules daily.     ? naproxen-diphenhydramine (ALEVE PM) 220-25 mg Tab Take 1 tablet by mouth daily as needed.     ? atorvastatin (LIPITOR) 10 MG tablet Take 1 tablet (10 mg total) by mouth daily. 30 tablet 11     No current facility-administered medications for this visit.        Objective:     /68 (Patient Site: Right Arm, Patient Position: Sitting, Cuff Size: Adult Small)  Pulse (!) 48  Resp 16  Ht 5' 2\" (1.575 m)  Wt 101 lb 12.8 oz (46.2 kg)  BMI 18.62 kg/m2  101 lb 12.8 oz (46.2 kg)       Physical Exam:    GENERAL APPEARANCE: alert, no apparent distress  HEENT: no scleral icterus or xanthelasma  NECK: jugular venous pressure within normal limits  CHEST: symmetric, the lungs are clear to auscultation  CARDIOVASCULAR: regular rhythm without murmur, click, or gallop; no carotid bruits  Abdomen: No Organomegaly, masses, bruits, or tenderness. Bowels sounds are present      EXTREMITIES: no cyanosis, clubbing or edema    Cardiac Testing:     Echo Complete   Order# [id number]    Reading physician: Demetrius Jason MD   Ordering physician: Kun Rodriguez MD   Study date: 17         Patient Information      Patient Name MRN Sex              Age     Katina Hoffman [id number] Female 1960 (57 y.o.)       Indications      Dx: TIA (transient ischemic attack) [G45.9 (ICD-10-CM)]       Summary        When compared to the previous study dated 2017, no significant change    Left ventricle ejection fraction is normal. The calculated left ventricular ejection fraction is 55%.    No significant valvular abnormalities         Patient Name MRN Sex              Age      Katina Hoffman " [id number] Female 1960 (57 y.o.)       EKG Scan       Scan on: 17 12:11 PM by:       Interpretation Summary        Good overall exercise tolerance. Resting bradycardia with appropriate heart rate response to exercise.    The stress electrocardiogram is negative for inducible ischemic EKG changes. Tracing artifact at higher levels of exercise,no obvious ST segment changes to suggest ischemia    Left ventricle ejection fraction at rest is normal. Resting left ventricular systolic function estimated to be 55-60%    Definity contrast utilized    Post exercise systolic function becomes appropriately augmented without observed wall motion abnormality consistent with a negative stress echocardiogram.    Stress echocardiogram is negative for inducible ischemia.    Test sensitivity is diminished due to fairly abrupt return to lower heart rate post exercise       Patient Name MRN Sex              Age      Katina Hoffman [id number] Female 1960 (57 y.o.)       Indications      Cerebral Thrombosis, Embolism, Artery Occlusion       Summary        Left Ventricle: Normal size.The calculated left ventricular ejection fraction is 63%. This represents a normal ejection fraction. The left ventricular wall thickness is normal. E/e' 8 to 15, which is equivocal for estimating LV filling pressures.No thrombus. No mass.    Aortic Valve: The valve is tricuspid. Lambl's excrescences were visualized on the valve. Small vegetations are less likely. Mild regurgitation.    No shunts as documented by negative color flow doppler and saline contrast injection.    When compared to the previous study dated 7/15/2015, small frond like density attached to aortic valve are now noted.             Lab Results:    Lab Results   Component Value Date     2017    K 3.9 2017     (H) 2017    CO2 26 2017    BUN 11 2017    CREATININE 0.70 2017    CALCIUM 8.9 2017     Lab Results   Component  Value Date    CHOL 168 04/06/2017    TRIG 47 04/06/2017    HDL 62 04/06/2017     No results found for: BNP  Creatinine (mg/dL)   Date Value   04/06/2017 0.70   04/05/2017 0.70   12/05/2014 0.63   08/22/2013 0.79     LDL Calculated (mg/dL)   Date Value   04/06/2017 97   06/22/2011 116     Lab Results   Component Value Date    WBC 4.1 04/05/2017    WBC 3.8 (L) 12/05/2014    HGB 13.2 04/05/2017    HCT 38.9 04/05/2017    MCV 93 04/05/2017     04/05/2017               This note has been dictated using voice recognition software. Any grammatical or context distortions are unintentional and inherent to the software.      Kun Rodriguez M.D., F.A.C.C.  [facility], [place]  [phone]

## 2021-06-25 NOTE — PROGRESS NOTES
Katina Hoffman is a 61 y.o. female who is being evaluated via a billable video visit.      How would you like to obtain your AVS? MyChart.  If dropped from the video visit, the video invitation should be resent by: Text to cell phone: [phone]  Will anyone else be joining your video visit? No      Video Start Time: 1020    [facility]  ENDOCRINOLOGY    Osteoporosis Follow Up 6/2/2021    Katina Hoffman, 1960, [id number]          Reason for visit      1. Age-related osteoporosis without current pathological fracture        History     Katina Hoffman is a very pleasant 61 y.o. old female who presents for follow up.   SUMMARY:    Katina is contacted today in f/u for Osteoporosis. She reports no falls in the last year. Her last Dexa Scan showed: Since the previous bone density dated January 30, 2020, there has been a +3.8% change in the bone density of the spine.  Additionally there has been no statistically significant % change in the hips bilaterally.  An increase in/or stability is considered a positive response to treatment.  Since his baseline bone density in 2017, there has been an overall 9% increase in bone density of the lumbar spine.  Additionally, there has been an overall increase of 5.9% in the left total hip and 6.3% in the right total hip. So, she has had a great response to the Prolia, which she has been getting now for 3 years without difficulty. She remains quite active in doing Spinning Class and she uses a recumbent Eliptical daily. Her last Vit D level was 30, and Calcium level was 9.2.    Dexa Scan: 4/05/21  Last Prolia Injection: 5/24/21      Risk Factors     The following high- risk conditions have been ruled out: celiac disease, eating disorders, gastric bypass, hyperparathyroidism, inflammatory bowel disease, hyperthyroidism, rheumatoid arthritis, lupus, chronic kidney disease.     Katina Hoffman has the following risk factors: Age, Female gender, , Low BMI, Family history of  osteoporosis and Early menopause (<45)     She is not on high risk medications such as glucocorticoids, anti-coagulants, anti-convulsants, chemotherapy.    Patient deniesHysterectomy, Oophrectomy, Breast cancer and Family history of breast cancer.    Menopause was at age: early 40s.     Past Medical History     Patient Active Problem List   Diagnosis     Abnormal Weight Loss     Chest Pain     Vitamin D Deficiency     Foot Pain (Soft Tissue)     Fatigue     Head External Swelling Occipital Left Side (___ Cm)     Abdominal Pain In The Central Upper Belly (Epigastric)     Serum Enzyme Levels - Lipase Elevated     Dizziness - 15 seconds to 4 hoiurs - episodic - no provocatory maneuvers     Cerumen Impaction     Earache (Symptom)     Tendonitis Of The Achilles Tendon     Tendonitis     Exostosis - left anterolateral skull - no change 2005 ---> 2013 - perceived as enlarging on 12/5/14     Right flank painv- onset about 11/5/14 - no injury     Visual field cut     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    Social History      reports that she has never smoked. She has never used smokeless tobacco. She reports current alcohol use of about 2.0 standard drinks of alcohol per week. She reports that she does not use drugs.      Review of Systems     Patient denies current pain, limited mobility, fractures.   Remainder per HPI.      Vital Signs     There were no vitals taken for this visit.    Physical Exam     GENERAL:  Normal, NIRD  EYES:  Pupils equal, round and reactive to light; no proptosis, lid lag or  periorbital  edema.  NECK: No abnormalities noted  MUSCULOSKELETAL: No joint abnormalities, FROM in all four extremities. No kyphosis.   LUNGS:  No respiratory distress  ABDOMEN:Soft, non-tender, no masses or organomegaly  NEURO: .No tremors  SKIN:  No acanthosis nigricans or vitiligo        Assessment     1. Age-related osteoporosis without current pathological fracture        Plan       Katina will remain on the Prolia injections. She will restart the Ergocalciferol 40455 international unit(s) to get her Vit D level up again. F/u with me in 1 year.         Katelyn Macias   Endocrinology  6/2/2021  5:51 AM        Current Medications     Outpatient Medications Prior to Visit   Medication Sig Dispense Refill     acyclovir (ZOVIRAX) 200 MG capsule Take 1 capsule (200 mg total) by mouth daily. 30 capsule 6     aspirin 325 MG tablet Take 325 mg by mouth daily.       cyclobenzaprine (FLEXERIL) 5 MG tablet 5-10mg every 8 hours as needed for muscle spasm 30 tablet 0     ergocalciferol (ERGOCALCIFEROL) 1,250 mcg (50,000 unit) capsule Take 1 capsule (50,000 Units total) by mouth once a week. 12 capsule 1     ergocalciferol (ERGOCALCIFEROL) 1,250 mcg (50,000 unit) capsule Take 1 capsule (50,000 Units total) by mouth every 7 days. 12 capsule 1     hydrocortisone (PROCTOSOL HC) 2.5 % rectal cream Apply to the affected area twice daily as needed. 28.35 g 0     ketorolac (TORADOL) 10 mg tablet Take 1 tablet (10 mg total) by mouth every 6 (six) hours as needed. 30 tablet 0     lidocaine (LIDODERM) 5 % 12 hours on and 12 hours off (Patient taking differently: 12 hours on and 12 hours off prn) 15 patch 0     naproxen-diphenhydramine (ALEVE PM) 220-25 mg Tab Take 1 tablet by mouth daily as needed.       ondansetron (ZOFRAN-ODT) 4 MG disintegrating tablet DISSOLVE 1 TABLET(4 MG) ON THE TONGUE EVERY 8 HOURS AS NEEDED FOR NAUSEA 12 tablet 1     SUMAtriptan (IMITREX) 50 MG tablet        TiZANidine (ZANAFLEX) 2 MG capsule Take 1 capsule (2 mg total)  by mouth 3 (three) times a day. 20 capsule 0     triamcinolone (KENALOG) 0.1 % cream Apply 1 application topically daily as needed. 30 g 3     Facility-Administered Medications Prior to Visit   Medication Dose Route Frequency Provider Last Rate Last Admin     denosumab 60 mg (PROLIA 60 mg/ml)  60 mg Subcutaneous Q6 Months Katelyn Macias NP   60 mg at 08/28/19 1237     denosumab 60 mg (PROLIA 60 mg/ml)  60 mg Subcutaneous Q6 Months Katelyn Macias NP   60 mg at 05/24/21 1539     denosumab 60 mg (PROLIA 60 mg/ml)  60 mg Subcutaneous Q6 Months Katelyn Macias NP             Lab Results     TSH   Date Value Ref Range Status   01/05/2021 3.15 0.30 - 5.00 uIU/mL Final     PTH   Date Value Ref Range Status   04/13/2018 112 (H) 10 - 86 pg/mL Final     Calcium   Date Value Ref Range Status   01/05/2021 9.2 8.5 - 10.5 mg/dL Final     Phosphorus   Date Value Ref Range Status   04/13/2018 3.4 2.5 - 4.5 mg/dL Final           Imaging Results   Last DEXA scan:  Results for orders placed in visit on 01/18/21   DXA Bone Density Scan    Narrative 4/5/2021      RE: Katina Hoffman  YOB: 1960        Dear Katelyn Macias,    Patient Profile:  60 y.o. female, postmenopausal, is here for the follow up bone density   test.   History of fractures - Yes;  Ribs and Shoulder. Family history of   osteoporosis - Yes;  mother.  Family history of hip fracture: None.   Smoking history - No. Osteoporosis treatment past -  Yes;    Bisphosphonates. Osteoporosis treatment current - Yes;  Prolia.  Chronic   medical problems - None. High risk medications -  None.      Assessment:    1. The spine bone density L1-L4 with T-score -1.8.  2. Femoral bone densities show left femoral neck T- score -2.1 and right   femoral neck T-score -1.8.  3. Trabecular bone score indicates good trabecular bone architecture.      60 y.o. female with LOW BONE DENSITY (OSTEOPENIA) and MODERATE fracture   risk, adjusted for the TBS, with major osteoporotic  fracture risk 13.1%   and hip fracture risk 1.8%.     Since the previous bone density dated January 30, 2020, there has been a   +3.8% change in the bone density of the spine.  Additionally there has   been no statistically significant % change in the hips bilaterally.  An   increase in/or stability is considered a positive response to treatment.    Since his baseline bone density in 2017, there has been an overall 9%   increase in bone density of the lumbar spine.  Additionally, there has   been an overall increase of 5.9% in the left total hip and 6.3% in the   right total hip.    Recommendations:  Appropriate calcium, vitamin D supplements, along with balance and weight   bearing exercise recommended.  Additionally, the transition from Prolia to   an oral antiresorptive agent can be considered with follow up bone density   scan in 1 to 2 years.      Bone densitometry was performed on your patient using our HouseTab   densitometer. The results are summarized and a copy of the actual scans   are included for your review. In conformity with the International Society   of Clinical Densitometry's most recent position statement for DXA   interpretation (2015), the diagnosis will be made on the lowest measured   T-score of the lumbar spine, femoral neck, total proximal femur or 33%   radius. Note the change in terminology for diagnostic classification from   OSTEOPENIA to LOW BONE MASS. All trending for sequential exams will be   done using multiple vertebrae or the total proximal femur. Fracture risk   is based on the WHO Fracture Risk Assessment Tool (FRAX). If additional   information is needed or if you would like to discuss the results, please   do not hesitate to call me.       Thank you for referring this patient to [facility] Osteoporosis   Services. We are happy to be of service in support of you and your   practice. If you have any questions or suggestions to improve our service,   please call me  at [phone].     Sincerely,     Erika Stock M.D. [facility]  [facility] Osteoporosis [facility]       Video-Visit Details    Type of service:  Video Visit    Video End Time (time video stopped): 1040  Originating Location (pt. Location): Home    Distant Location (provider location):  [facility]     Platform used for Video Visit: Jose Ramon

## 2021-06-25 NOTE — PROGRESS NOTES
Progress Notes by Kun Rodriguez MD at 5/8/2017  4:10 PM     Author: Kun Rodriguez MD Service: -- Author Type: Physician    Filed: 5/8/2017  5:22 PM Encounter Date: 5/8/2017 Status: Signed    : Kun Rodriguez MD (Physician)       [facility] Progress Note    Assessment:  1.  Possible TIA.  No recurrence of symptoms.  She will be following up with Dr. Álvarez in the near future and encouraged to follow-up with her primary care physician.  We are going to plan follow-up echocardiography to further evaluate the small densities on the aortic valve.  Blood cultures were negative ×2.  Brain MRI was negative.  She does have some bradycardic tendency she had a Holter monitor July 2015 that demonstrated no significant    2.  Intolerant to statin.  She was started on statin during the recent hospitalization after discussion with neurology.  She states that she was not able to tolerate secondary to musculoskeletal discomfort.  She does have a significant family history of premature heart disease in both her mother and father in [place] previously suggested consideration of CT scan for calcium scoring.  This was not subsequently pursued may be reasonable to consider.  She had a carotid CTA that demonstrated no aneurysm or significant stenosis and no reported plaque.  Lipid results from recent hospitalization finds that her LDL was 97 higher to initiation of statin.        Plan: Echocardiogram and follow-up in 4-6 months    1. 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 Sinus bradycardia           An After Visit Summary was printed and given to the patient.    Subjective:    Katina Hoffman is a 57 y.o. female who returned for a planned  follow up visit.  She reports that she stopped the statin because of musculoskeletal discomfort that was worse on the right side including her right shoulder and right leg but since stopping symptoms are mostly  improved although she continues to have some right shoulder discomfort.  She has had some tingling in her right upper arm but has had no recurrent loss of vision although she reports some occasional blurry vision.  She has had no chest discomfort or significant lightheaded symptoms and is back to exercising regularly.  Matter secondary to possible TIA and subsequent echocardiogram that suggested normal left ventricular systolic function but with evidence for Lambl's excrescence on the aortic valve.  Difficult to determine whether this had any relationship to the event.  She also underwent stress echocardiography secondary to resting bradycardia that demonstrated good overall exercise tolerance with appropriate heart rate response to exercise.    Review of Systems:   General: Night Sweats  Eyes: WNL  Ears/Nose/Throat: Hearing Loss  Lungs: WNL  Heart: Arm Pain (palpitations)  Stomach: WNL  Bladder: WNL  Muscle/Joints: Muscle Weakness, Muscle Pain  Skin: WNL  Nervous System: Dizziness  Mental Health: WNL     Blood: WNL      Problem List:    Patient Active Problem List   Diagnosis   ? Abnormal Weight Loss   ? Chest Pain   ? Vitamin D Deficiency   ? Anxiety   ? Foot Pain (Soft Tissue)   ? Fatigue   ? Head External Swelling Occipital Left Side (___ Cm)   ? Abdominal Pain In The Central Upper Belly (Epigastric)   ? Serum Enzyme Levels - Lipase Elevated   ? Dizziness - 15 seconds to 4 hoiurs - episodic - no provocatory maneuvers   ? Cerumen Impaction   ? Earache (Symptom)   ? Tendonitis Of The Achilles Tendon   ? Tendonitis   ? Exostosis - left anterolateral skull - no change 2005 ---> 2013 - perceived as enlarging on 12/5/14   ? Right flank painv- onset about 11/5/14 - no injury   ? Visual field cut   ? 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 Insomnia   ? Sinus bradycardia       Social History     Social History   ? Marital status:      Spouse name: N/A   ? Number of children: N/A   ? Years of education: N/A  "    Occupational History   ? Not on file.     Social History Main Topics   ? Smoking status: Never Smoker   ? Smokeless tobacco: Never Used   ? Alcohol use 1.2 oz/week     2 Glasses of wine per week   ? Drug use: No   ? Sexual activity: Not on file     Other Topics Concern   ? Not on file     Social History Narrative       Family History   Problem Relation Age of Onset   ? Breast cancer Maternal Aunt 65   ? Breast cancer Paternal Aunt        Current Outpatient Prescriptions   Medication Sig Dispense Refill   ? ascorbic acid-vitamin E-biotin (HAIR, SKIN, NAILS WITH BIOTIN) 7.5-7.5-1,250 mg-unit-mcg Chew Chew 2 capsules daily.     ? aspirin 325 MG EC tablet Take 1 tablet (325 mg total) by mouth daily.  0   ? cholecalciferol, vitamin D3, 1,000 unit Chew Chew 2,000 Units daily.     ? cyanocobalamin, vitamin B-12, 1,000 mcg Subl Place 1,000 mcg under the tongue daily.     ? multivit with min-folic acid 200 mcg Chew Chew 2 capsules daily.     ? naproxen-diphenhydramine (ALEVE PM) 220-25 mg Tab Take 1 tablet by mouth daily as needed.     ? simvastatin (ZOCOR) 10 MG tablet Take 1 tablet (10 mg total) by mouth at bedtime. For stroke prevention. 30 tablet 3     No current facility-administered medications for this visit.        Objective:     /68 (Patient Site: Right Arm, Patient Position: Sitting, Cuff Size: Adult Regular)  Pulse (!) 48  Resp 16  Ht 5' 1\" (1.549 m)  Wt 104 lb 3.2 oz (47.3 kg)  BMI 19.69 kg/m2  104 lb 3.2 oz (47.3 kg)       Physical Exam:    GENERAL APPEARANCE: alert, no apparent distress  HEENT: no scleral icterus or xanthelasma  NECK: jugular venous pressure within normal limits  CHEST: symmetric, the lungs are clear to auscultation  CARDIOVASCULAR: regular rhythm without murmur, click, or gallop; no carotid bruits  Abdomen: No Organomegaly, masses, bruits, or tenderness. Bowels sounds are present      EXTREMITIES: no cyanosis, clubbing or edema, heel-to-shin within normal limits finger nose " finger within normal limits, no detectable strength difference    Cardiac Testing:  Patient Name MRN Sex              Age      Katina Hoffman [id number] Female 1960 (57 y.o.)       EKG Scan       Scan on: 17 12:11 PM by:       Interpretation Summary        Good overall exercise tolerance. Resting bradycardia with appropriate heart rate response to exercise.    The stress electrocardiogram is negative for inducible ischemic EKG changes. Tracing artifact at higher levels of exercise,no obvious ST segment changes to suggest ischemia    Left ventricle ejection fraction at rest is normal. Resting left ventricular systolic function estimated to be 55-60%    Definity contrast utilized    Post exercise systolic function becomes appropriately augmented without observed wall motion abnormality consistent with a negative stress echocardiogram.    Stress echocardiogram is negative for inducible ischemia.    Test sensitivity is diminished due to fairly abrupt return to lower heart rate post exercise     IMPRESSION:   CONCLUSION:   HEAD CT:   1. Noncontrast head CT demonstrates no hemorrhage, mass/mass effect or CT evidence of acute territorial infarct. MRI is more sensitive in the assessment of acute ischemia/infarction.     HEAD CTA:  1. Head CTA demonstrates no aneurysm or significant stenosis in the proximal intracranial vessels.     NECK CTA:  1. No hemodynamically significant stenosis of the cervical vasculature, based on NASCET criteria.  2. No evidence for vessel dissection.     Head CT results called to Dr. Banuelos at 0940 hours, and CTA results called to Dr. Banuelos at on 0955 hours on 2017  IMPRESSION:   CONCLUSION:  1. No acute intracranial finding. No evidence for recent ischemia, intracranial hemorrhage, or mass.  2. Unremarkable brain MRI for age.    Lab Results:    Lab Results   Component Value Date     2017    K 3.9 2017     (H) 2017    CO2 26 2017    BUN 11  04/06/2017    CREATININE 0.70 04/06/2017    CALCIUM 8.9 04/06/2017     Lab Results   Component Value Date    CHOL 168 04/06/2017    TRIG 47 04/06/2017    HDL 62 04/06/2017     No results found for: BNP  Creatinine (mg/dL)   Date Value   04/06/2017 0.70   04/05/2017 0.70   12/05/2014 0.63   08/22/2013 0.79     LDL Calculated (mg/dL)   Date Value   04/06/2017 97   06/22/2011 116     Lab Results   Component Value Date    WBC 4.1 04/05/2017    WBC 3.8 (L) 12/05/2014    HGB 13.2 04/05/2017    HCT 38.9 04/05/2017    MCV 93 04/05/2017     04/05/2017               This note has been dictated using voice recognition software. Any grammatical or context distortions are unintentional and inherent to the software.      Kun Rodriguez M.D., F.A.C.C.  [facility]  [phone]

## 2021-06-26 NOTE — PROGRESS NOTES
Progress Notes by Kun Rodriguez MD at 8/20/2018  8:10 AM     Author: Kun Rodriguez MD Service: -- Author Type: Physician    Filed: 8/20/2018  9:08 AM Encounter Date: 8/20/2018 Status: Signed    : Kun Rodriguez MD (Physician)       [facility] Progress Note    Assessment:  1.  Possible TIA as outlined in 2017 and an event last week where she had trouble seeing her several words secondary to blurriness for approximately 40 minutes.  She is encouraged to take her aspirin and I increased to 162 mg daily.  She is encouraged to talk to her neurologist today to discuss these findings.    2.  Atypical left arm discomfort.  She does have a negative stress echocardiogram in April 2017.  She does not have any exertional component.  She does state however that this occurs intermittently.  We are going to plan follow-up stress testing as well as a coronary CT calcium score.  She has family history of heart disease and prior recommendations were made by another cardiologist about coronary calcium scoring.  She is now willing to consider this option.  He did have a carotid CTA that demonstrated no aneurysm or significant stenosis or reported plaque.  She has been intolerant to statins.    3.  Intermittent dizziness with bradycardic tendency.  Event monitor is planned.  She is encouraged to keep her nutrition adequate.  Is encouraged to keep herself well-hydrated.  Fairly prominent bradycardia with heart rate in the 30s on today's examination and EKG.  She is not acutely dizzy.  3 of heart rates in the 40s.      Plan: Two-week patch monitor and asked her to contact me with any questions or concerns.  I did ask her to follow-up with both her primary care physician and Dr. James from neurology.    1. Dizziness  KUSH Hook-Up    ECG Clinic - Today   2. Family history of coronary artery disease  CT Cardiac Calcium Score   3. Pain of left upper arm  Echo Stress Exercise         An After Visit Summary was  printed and given to the patient.    Subjective:    Katina Hoffman is a 58 y.o. female who returned for a planned  follow up visit.  She reports to me today that she did have temporary blurring of her vision last week when she was at work where she had difficulty seeing letters at the end of the word.  She indicated that this lasted approximately 40 minutes.  She has had no recurrence of this event.  She also reports some intermittent arm pain that is nonexertional.  She did not to touch base with either her primary care physician or her neurologist with this symptom.  She continues to exercise vigorously and states that she biked 22 miles recently has had no difficulty with exercise-induced chest or arm discomfort.    She allso reports feeling lightheaded intermittently and some awareness of palpitations.  This was not associated with the visual changes mentioned last week.    As outlined she had a possible TIA back in 2017 and underwent multiple evaluation.  She had a brain MRI that was reported negative.  She had possible lamble's excrescence on the vertex valve with the most recent echocardiogram November 2017 not describing any obvious findings.    She does indicate that she could do a better job with keeping her regional intake up.  He is reportedly heterozygous for MT HFR mutation    Review of Systems:   General: Night Sweats  Eyes: Visual Distubance  Ears/Nose/Throat: Hearing Loss  Lungs: WNL  Heart: Arm Pain, Irregular Heartbeat  Stomach: Heartburn  Bladder: WNL  Muscle/Joints: WNL  Skin: WNL  Nervous System: Dizziness  Mental Health: WNL     Blood: WNL      Problem List:    Patient Active Problem List   Diagnosis   ? Abnormal Weight Loss   ? Chest Pain   ? Vitamin D Deficiency   ? Anxiety   ? Foot Pain (Soft Tissue)   ? Fatigue   ? Head External Swelling Occipital Left Side (___ Cm)   ? Abdominal Pain In The Central Upper Belly (Epigastric)   ? Serum Enzyme Levels - Lipase Elevated   ? Dizziness - 15  seconds to 4 hoiurs - episodic - no provocatory maneuvers   ? Cerumen Impaction   ? Earache (Symptom)   ? Tendonitis Of The Achilles Tendon   ? Tendonitis   ? Exostosis - left anterolateral skull - no change 2005 ---> 2013 - perceived as enlarging on 12/5/14   ? Right flank painv- onset about 11/5/14 - no injury   ? Visual field cut   ? 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 Insomnia   ? Sinus bradycardia   ? Osteoporosis   ? Hypothyroid       Social History     Social History   ? Marital status:      Spouse name: N/A   ? Number of children: N/A   ? Years of education: N/A     Occupational History   ? Not on file.     Social History Main Topics   ? Smoking status: Never Smoker   ? Smokeless tobacco: Never Used   ? Alcohol use 1.2 oz/week     2 Glasses of wine per week   ? Drug use: No   ? Sexual activity: Not on file     Other Topics Concern   ? Not on file     Social History Narrative       Family History   Problem Relation Age of Onset   ? Breast cancer Maternal Aunt 65   ? Breast cancer Paternal Aunt      Unsure of age       Current Outpatient Prescriptions   Medication Sig Dispense Refill   ? aspirin 81 MG EC tablet Take 81 mg by mouth daily.     ? conjugated estrogens (PREMARIN) vaginal cream 1/2 gm vaginally daily as needed 42.5 g 12   ? hydrocortisone-pramoxine (ANALPRAM-HC) 2.5-1 % rectal cream Insert into the rectum 3 (three) times a day. 28.35 g 3   ? ketorolac (TORADOL) 10 mg tablet Take 10 mg by mouth daily as needed.     ? naproxen-diphenhydramine (ALEVE PM) 220-25 mg Tab Take 1 tablet by mouth daily as needed.     ? triamcinolone (KENALOG) 0.1 % cream Apply 1 application topically daily as needed.  1   ? ascorbic acid-vitamin E-biotin (HAIR, SKIN, NAILS WITH BIOTIN) 7.5-7.5-1,250 mg-unit-mcg Chew Chew 2 capsules daily.     ? cholecalciferol, vitamin D3, 1,000 unit Chew Chew 2,000 Units daily.     ? cyanocobalamin, vitamin B-12, 1,000 mcg Subl Place 1,000 mcg under the tongue daily.     ?  "levothyroxine (SYNTHROID, LEVOTHROID) 25 MCG tablet Take 1 tablet (25 mcg total) by mouth daily. 90 tablet 1   ? multivit with min-folic acid 200 mcg Chew Chew 2 capsules daily.     ? risedronate (ACTONEL) 35 MG tablet Take 1 tablet (35 mg total) by mouth every 7 days. with water on empty stomach, nothing by mouth or lie down for next 30 minutes. 5 tablet 5     No current facility-administered medications for this visit.        Objective:     /68 (Patient Site: Right Arm, Patient Position: Sitting, Cuff Size: Adult Regular)  Pulse (!) 44  Resp 16  Ht 5' 2\" (1.575 m)  Wt 107 lb (48.5 kg)  BMI 19.57 kg/m2  107 lb (48.5 kg)       Physical Exam:    GENERAL APPEARANCE: alert, no apparent distress  HEENT: no scleral icterus or xanthelasma  NECK: jugular venous pressure within normal limits  CHEST: symmetric, the lungs are clear to auscultation  CARDIOVASCULAR: regular rhythm without murmur, click, or gallop; no carotid bruits mildly bradycardic  Abdomen: No Organomegaly, masses, bruits, or tenderness. Bowels sounds are present      EXTREMITIES: no cyanosis, clubbing or edema    Cardiac Testing:  Reviewed By      Brittany Gabriel RN on 2017  5:09 PM     Kun Rodriguez MD on 2017  6:48 PM       Echo Complete   Order# [id number]    Reading physician: Jose Villagomez MD (Ted)   Ordering physician: Kun Rodriguez MD   Study date: 17         Patient Information      Patient Name MRN Sex              Age     Katina Hoffman [id number] Female 1960 (58 y.o.)       Indications      Dx: TIA (transient ischemic attack) [G45.9 (ICD-10-CM)]       Summary        Left ventricle ejection fraction is normal. The calculated left ventricular ejection fraction is 58%.    Left Atrium: Normal size. No thrombus present. No mass present. No spontaneous echo contrast. There is no atrial septal defect.    No hemodynamically significant valvular heart abnormalities.    When compared to the previous " study dated 5/17/2017, no significant change.           Katina Hoffman [id number] Female 1960 (58 y.o.)       EKG Scan       Scan on: 4/6/17 12:11 PM by:       Interpretation Summary        Good overall exercise tolerance. Resting bradycardia with appropriate heart rate response to exercise.    The stress electrocardiogram is negative for inducible ischemic EKG changes. Tracing artifact at higher levels of exercise,no obvious ST segment changes to suggest ischemia    Left ventricle ejection fraction at rest is normal. Resting left ventricular systolic function estimated to be 55-60%    Definity contrast utilized    Post exercise systolic function becomes appropriately augmented without observed wall motion abnormality consistent with a negative stress echocardiogram.    Stress echocardiogram is negative for inducible ischemia.    Test sensitivity is diminished due to fairly abrupt return to lower heart rate post exercise           Study Result      [facility]  HEAD MRI WITHOUT AND WITH IV CONTRAST  6/27/2018 7:24 PM     INDICATION: Headaches. Loss of sense of smell.  TECHNIQUE: Head MRI without and with intravenous contrast.  CONTRAST: 3 mL Gadavist  COMPARISON:  None.     FINDINGS: No acute infarct/restricted diffusion. No intra-axial mass lesion, acute hemorrhage, or extra-axial fluid collections. Mild generalized cerebral and cerebellar atrophy. Few small foci of nonspecific T2/FLAIR hyperintense signal in the cerebral   white matter most consistent with mild chronic small vessel ischemic changes. No abnormal contrast enhancement identified.     The pituitary gland is unremarkable. The major intracranial vascular flow voids are maintained. The calvarium and skull base are unremarkable.  The orbits are unremarkable. The paranasal sinuses are clear. The mastoid air cells are clear.      IMPRESSION:   CONCLUSION:  1.  No acute infarcts, mass lesions or hemorrhage.  2.  Age-related changes as detailed  above.     Sinus bradycardia at 35 beats J-point elevation.    Lab Results:    Lab Results   Component Value Date     04/26/2018    K 4.4 04/26/2018     04/26/2018    CO2 26 04/26/2018    BUN 14 04/26/2018    CREATININE 0.80 04/26/2018    CALCIUM 10.6 (H) 04/26/2018     Lab Results   Component Value Date    CHOL 168 04/06/2017    TRIG 47 04/06/2017    HDL 62 04/06/2017     No results found for: BNP  Creatinine (mg/dL)   Date Value   04/26/2018 0.80   04/13/2018 0.68   04/06/2017 0.70   04/05/2017 0.70     LDL Calculated (mg/dL)   Date Value   04/06/2017 97   06/22/2011 116     Lab Results   Component Value Date    WBC 4.1 04/05/2017    WBC 3.8 (L) 12/05/2014    HGB 13.2 04/05/2017    HCT 38.9 04/05/2017    MCV 93 04/05/2017     04/05/2017               This note has been dictated using voice recognition software. Any grammatical or context distortions are unintentional and inherent to the software.      Kun Rodriguez M.D., F.A.C.C.  [facility]  [phone]

## 2021-06-27 ENCOUNTER — HEALTH MAINTENANCE LETTER (OUTPATIENT)
Age: 61
End: 2021-06-27

## 2021-07-03 NOTE — ADDENDUM NOTE
Addendum Note by Ramandeep Smith CMA at 4/16/2018  2:06 PM     Author: Ramandeep Smith CMA Service: -- Author Type: Certified Medical Assistant    Filed: 4/16/2018  2:06 PM Encounter Date: 4/13/2018 Status: Signed    : Ramandeep Smith CMA (Certified Medical Assistant)    Addended by: RAMANDEEP SMITH on: 4/16/2018 02:06 PM        Modules accepted: Orders

## 2021-07-03 NOTE — ADDENDUM NOTE
Addendum Note by Ramandeep Smith CMA at 4/16/2018  1:32 PM     Author: Ramandeep Smith CMA Service: -- Author Type: Certified Medical Assistant    Filed: 4/16/2018  1:32 PM Encounter Date: 4/13/2018 Status: Signed    : Ramandeep Smith CMA (Certified Medical Assistant)    Addended by: RAMANDEEP SMITH on: 4/16/2018 01:32 PM        Modules accepted: Orders

## 2021-07-03 NOTE — ADDENDUM NOTE
Addendum Note by Ramandeep Smith CMA at 4/18/2018  7:24 AM     Author: Ramandeep Smith CMA Service: -- Author Type: Certified Medical Assistant    Filed: 4/18/2018  7:24 AM Encounter Date: 4/13/2018 Status: Signed    : Ramandeep Smith CMA (Certified Medical Assistant)    Addended by: RAMANDEEP SMITH on: 4/18/2018 07:24 AM        Modules accepted: Orders

## 2021-07-13 ENCOUNTER — RECORDS - HEALTHEAST (OUTPATIENT)
Dept: ADMINISTRATIVE | Facility: CLINIC | Age: 61
End: 2021-07-13

## 2021-07-20 ENCOUNTER — TRANSCRIBE ORDERS (OUTPATIENT)
Dept: ENDOCRINOLOGY | Facility: CLINIC | Age: 61
End: 2021-07-20

## 2021-07-20 DIAGNOSIS — M81.0 AGE-RELATED OSTEOPOROSIS WITHOUT CURRENT PATHOLOGICAL FRACTURE: Primary | ICD-10-CM

## 2021-07-20 DIAGNOSIS — T50.905S UNSPECIFIED ADVERSE EFFECT OF DRUG OR MEDICAMENT, SEQUELA: ICD-10-CM

## 2021-07-20 NOTE — PROGRESS NOTES
As part of the required manual data conversion process for integration, this encounter was created to document a CAM (Clinic Administered Medication) order. This information was copied from the [facility] patient's chart to the [facility] patient chart.     Adina Vera PharmD  July 20, 2021

## 2021-07-21 ENCOUNTER — RECORDS - HEALTHEAST (OUTPATIENT)
Dept: ADMINISTRATIVE | Facility: CLINIC | Age: 61
End: 2021-07-21

## 2021-07-22 ENCOUNTER — RECORDS - HEALTHEAST (OUTPATIENT)
Dept: SCHEDULING | Facility: CLINIC | Age: 61
End: 2021-07-22

## 2021-07-22 DIAGNOSIS — Z12.31 OTHER SCREENING MAMMOGRAM: ICD-10-CM

## 2021-08-04 ENCOUNTER — TELEPHONE (OUTPATIENT)
Dept: ENDOCRINOLOGY | Facility: CLINIC | Age: 61
End: 2021-08-04

## 2021-08-04 NOTE — TELEPHONE ENCOUNTER
Patient has her follow up appointment scheduled for 06.21.2022, and wants to make sure there are orders in her chart for labs. She works at [employer] and will plan to have labs done some time in May 2022    No call back necessary as long as lab orders are placed in her chart.

## 2021-08-06 ENCOUNTER — MYC MEDICAL ADVICE (OUTPATIENT)
Dept: ENDOCRINOLOGY | Facility: CLINIC | Age: 61
End: 2021-08-06

## 2021-08-06 DIAGNOSIS — M81.0 AGE-RELATED OSTEOPOROSIS WITHOUT CURRENT PATHOLOGICAL FRACTURE: Primary | ICD-10-CM

## 2021-08-06 DIAGNOSIS — M81.0 OSTEOPOROSIS WITHOUT CURRENT PATHOLOGICAL FRACTURE, UNSPECIFIED OSTEOPOROSIS TYPE: Primary | ICD-10-CM

## 2021-08-10 ENCOUNTER — MYC MEDICAL ADVICE (OUTPATIENT)
Dept: ENDOCRINOLOGY | Facility: CLINIC | Age: 61
End: 2021-08-10

## 2021-09-01 ENCOUNTER — MYC MEDICAL ADVICE (OUTPATIENT)
Dept: FAMILY MEDICINE | Facility: CLINIC | Age: 61
End: 2021-09-01

## 2021-09-01 DIAGNOSIS — R74.8 ELEVATED LIPASE: Primary | ICD-10-CM

## 2021-09-01 DIAGNOSIS — Z86.16 HISTORY OF COVID-19: ICD-10-CM

## 2021-09-02 ENCOUNTER — LAB (OUTPATIENT)
Dept: LAB | Facility: HOSPITAL | Age: 61
End: 2021-09-02
Payer: COMMERCIAL

## 2021-09-02 DIAGNOSIS — Z86.16 HISTORY OF COVID-19: ICD-10-CM

## 2021-09-02 PROCEDURE — 36415 COLL VENOUS BLD VENIPUNCTURE: CPT

## 2021-09-02 PROCEDURE — 86769 SARS-COV-2 COVID-19 ANTIBODY: CPT

## 2021-09-03 LAB — SARS-COV-2 AB SERPL QL IA: POSITIVE

## 2021-09-10 DIAGNOSIS — Z86.16 HISTORY OF COVID-19: Primary | ICD-10-CM

## 2021-09-13 ENCOUNTER — LAB (OUTPATIENT)
Dept: LAB | Facility: HOSPITAL | Age: 61
End: 2021-09-13
Attending: FAMILY MEDICINE
Payer: COMMERCIAL

## 2021-09-13 ENCOUNTER — HOSPITAL ENCOUNTER (OUTPATIENT)
Dept: CT IMAGING | Facility: HOSPITAL | Age: 61
End: 2021-09-13
Attending: FAMILY MEDICINE
Payer: COMMERCIAL

## 2021-09-13 DIAGNOSIS — R74.8 ELEVATED LIPASE: ICD-10-CM

## 2021-09-13 DIAGNOSIS — Z86.16 HISTORY OF COVID-19: ICD-10-CM

## 2021-09-13 PROCEDURE — 250N000011 HC RX IP 250 OP 636: Performed by: FAMILY MEDICINE

## 2021-09-13 PROCEDURE — 86769 SARS-COV-2 COVID-19 ANTIBODY: CPT

## 2021-09-13 PROCEDURE — 36415 COLL VENOUS BLD VENIPUNCTURE: CPT

## 2021-09-13 PROCEDURE — 74177 CT ABD & PELVIS W/CONTRAST: CPT

## 2021-09-13 RX ORDER — IOPAMIDOL 755 MG/ML
100 INJECTION, SOLUTION INTRAVASCULAR ONCE
Status: COMPLETED | OUTPATIENT
Start: 2021-09-13 | End: 2021-09-13

## 2021-09-13 RX ADMIN — IOPAMIDOL 100 ML: 755 INJECTION, SOLUTION INTRAVENOUS at 21:09

## 2021-09-15 LAB
SARS-COV-2 AB SERPL IA-ACNC: >250 U/ML
SARS-COV-2 AB SERPL QL IA: POSITIVE

## 2021-09-28 ENCOUNTER — HOSPITAL ENCOUNTER (OUTPATIENT)
Dept: RADIOLOGY | Facility: HOSPITAL | Age: 61
Discharge: HOME | End: 2021-09-28
Attending: PHYSICIAN ASSISTANT | Admitting: PHYSICIAN ASSISTANT
Payer: COMMERCIAL

## 2021-09-28 DIAGNOSIS — M79.674 PAIN OF TOE OF RIGHT FOOT: Primary | ICD-10-CM

## 2021-09-28 PROCEDURE — 73660 X-RAY EXAM OF TOE(S): CPT | Mod: RT

## 2021-10-01 ENCOUNTER — TELEPHONE (OUTPATIENT)
Dept: FAMILY MEDICINE | Facility: CLINIC | Age: 61
End: 2021-10-01

## 2021-10-01 NOTE — TELEPHONE ENCOUNTER
Patient is wanting to have labs done prior to appointment on 10/11 so they can be review at the appointment.     Patient stated that she needs for sure lipase, amylase, and LFT orders.    Please place if appropriate.

## 2021-10-04 DIAGNOSIS — R74.8 ELEVATED LIPASE: ICD-10-CM

## 2021-10-04 DIAGNOSIS — E55.9 VITAMIN D DEFICIENCY: ICD-10-CM

## 2021-10-04 DIAGNOSIS — M81.0 AGE-RELATED OSTEOPOROSIS WITHOUT CURRENT PATHOLOGICAL FRACTURE: Primary | ICD-10-CM

## 2021-10-11 ENCOUNTER — LAB (OUTPATIENT)
Dept: LAB | Facility: HOSPITAL | Age: 61
End: 2021-10-11
Payer: COMMERCIAL

## 2021-10-11 ENCOUNTER — VIRTUAL VISIT (OUTPATIENT)
Dept: FAMILY MEDICINE | Facility: CLINIC | Age: 61
End: 2021-10-11
Payer: COMMERCIAL

## 2021-10-11 DIAGNOSIS — M81.0 AGE-RELATED OSTEOPOROSIS WITHOUT CURRENT PATHOLOGICAL FRACTURE: ICD-10-CM

## 2021-10-11 DIAGNOSIS — B00.1 HERPES LABIALIS: ICD-10-CM

## 2021-10-11 DIAGNOSIS — M79.676 PAIN OF TOE, UNSPECIFIED LATERALITY: ICD-10-CM

## 2021-10-11 DIAGNOSIS — R74.8 ELEVATED LIPASE: ICD-10-CM

## 2021-10-11 DIAGNOSIS — E55.9 VITAMIN D DEFICIENCY: ICD-10-CM

## 2021-10-11 DIAGNOSIS — R10.13 ABDOMINAL PAIN, EPIGASTRIC: Primary | ICD-10-CM

## 2021-10-11 LAB
ALBUMIN SERPL-MCNC: 3.9 G/DL (ref 3.5–5)
ALP SERPL-CCNC: 72 U/L (ref 45–120)
ALT SERPL W P-5'-P-CCNC: 16 U/L (ref 0–45)
AMYLASE SERPL-CCNC: 223 U/L (ref 5–120)
ANION GAP SERPL CALCULATED.3IONS-SCNC: 7 MMOL/L (ref 5–18)
AST SERPL W P-5'-P-CCNC: 28 U/L (ref 0–40)
BILIRUB SERPL-MCNC: 1.4 MG/DL (ref 0–1)
BUN SERPL-MCNC: 16 MG/DL (ref 8–22)
CALCIUM SERPL-MCNC: 9.9 MG/DL (ref 8.5–10.5)
CHLORIDE BLD-SCNC: 105 MMOL/L (ref 98–107)
CO2 SERPL-SCNC: 30 MMOL/L (ref 22–31)
CREAT SERPL-MCNC: 0.8 MG/DL (ref 0.6–1.1)
DEPRECATED CALCIDIOL+CALCIFEROL SERPL-MC: 21 UG/L (ref 30–80)
ERYTHROCYTE [DISTWIDTH] IN BLOOD BY AUTOMATED COUNT: 13.1 % (ref 10–15)
GFR SERPL CREATININE-BSD FRML MDRD: 81 ML/MIN/1.73M2
GLUCOSE BLD-MCNC: 97 MG/DL (ref 70–125)
HCT VFR BLD AUTO: 41.2 % (ref 35–47)
HGB BLD-MCNC: 13.1 G/DL (ref 11.7–15.7)
LIPASE SERPL-CCNC: 116 U/L (ref 0–52)
MCH RBC QN AUTO: 30.9 PG (ref 26.5–33)
MCHC RBC AUTO-ENTMCNC: 31.8 G/DL (ref 31.5–36.5)
MCV RBC AUTO: 97 FL (ref 78–100)
PLATELET # BLD AUTO: 218 10E3/UL (ref 150–450)
POTASSIUM BLD-SCNC: 4 MMOL/L (ref 3.5–5)
PROT SERPL-MCNC: 6.7 G/DL (ref 6–8)
RBC # BLD AUTO: 4.24 10E6/UL (ref 3.8–5.2)
SODIUM SERPL-SCNC: 142 MMOL/L (ref 136–145)
WBC # BLD AUTO: 5.1 10E3/UL (ref 4–11)

## 2021-10-11 PROCEDURE — 36415 COLL VENOUS BLD VENIPUNCTURE: CPT

## 2021-10-11 PROCEDURE — 99213 OFFICE O/P EST LOW 20 MIN: CPT | Mod: 95 | Performed by: FAMILY MEDICINE

## 2021-10-11 PROCEDURE — 82150 ASSAY OF AMYLASE: CPT

## 2021-10-11 PROCEDURE — 82306 VITAMIN D 25 HYDROXY: CPT

## 2021-10-11 PROCEDURE — 85014 HEMATOCRIT: CPT

## 2021-10-11 PROCEDURE — 82040 ASSAY OF SERUM ALBUMIN: CPT

## 2021-10-11 PROCEDURE — 83690 ASSAY OF LIPASE: CPT

## 2021-10-11 RX ORDER — OMEPRAZOLE 40 MG/1
40 CAPSULE, DELAYED RELEASE ORAL DAILY
Qty: 30 CAPSULE | Refills: 3 | Status: SHIPPED | OUTPATIENT
Start: 2021-10-11 | End: 2022-07-05

## 2021-10-11 RX ORDER — ACYCLOVIR 200 MG/1
400 CAPSULE ORAL EVERY 12 HOURS
Qty: 120 CAPSULE | Refills: 3 | Status: SHIPPED | OUTPATIENT
Start: 2021-10-11

## 2021-10-11 RX ORDER — KETOROLAC TROMETHAMINE 10 MG/1
10 TABLET, FILM COATED ORAL EVERY 6 HOURS PRN
Qty: 30 TABLET | Refills: 0 | Status: SHIPPED | OUTPATIENT
Start: 2021-10-11 | End: 2022-07-01

## 2021-10-11 RX ORDER — ERGOCALCIFEROL 1.25 MG/1
50000 CAPSULE, LIQUID FILLED ORAL WEEKLY
Qty: 12 CAPSULE | Refills: 0 | Status: SHIPPED | OUTPATIENT
Start: 2021-10-11 | End: 2022-07-05

## 2021-10-11 NOTE — PROGRESS NOTES
Adilene is a 61 year old who is being evaluated via a billable video visit.      How would you like to obtain your AVS? MyChart  If the video visit is dropped, the invitation should be resent by: Text to cell phone: [phone]  Will anyone else be joining your video visit? No      Video Start Time: 0402    Assessment & Plan     Abdominal Pain In The Central Upper Belly (Epigastric)  - omeprazole (PRILOSEC) 40 MG DR capsule  Dispense: 30 capsule; Refill: 3  - Lipase  - Amylase  - Hepatic panel (Albumin, ALT, AST, Bili, Alk Phos, TP)  - Vitamin D Deficiency    Pain of toe, unspecified laterality  - ketorolac (TORADOL) 10 MG tablet  Dispense: 30 tablet; Refill: 0    Vitamin D deficiency  - vitamin D2 (ERGOCALCIFEROL) 54790 units (1250 mcg) capsule  Dispense: 12 capsule; Refill: 0    Herpes labialis  - acyclovir (ZOVIRAX) 200 MG capsule  Dispense: 120 capsule; Refill: 3    Padmini Cervantes MD  [facility]   Adilene is a 61 year old who presents for the following health issues     HPI   Follow up labs given history of elevated lipase.  No increase in symptoms, weight is stable.  Eating well.    Pain in toe, uses toradol periodically and would like this refilled.      Review of Systems   Constitutional, HEENT, cardiovascular, pulmonary, gi and gu systems are negative, except as otherwise noted.      Objective           Vitals:  No vitals were obtained today due to virtual visit.    Physical Exam   GENERAL: Healthy, alert and no distress  EYES: Eyes grossly normal to inspection.  No discharge or erythema, or obvious scleral/conjunctival abnormalities.  RESP: No audible wheeze, cough, or visible cyanosis.  No visible retractions or increased work of breathing.    SKIN: Visible skin clear. No significant rash, abnormal pigmentation or lesions.  NEURO: Cranial nerves grossly intact.  Mentation and speech appropriate for age.  PSYCH: Mentation appears normal, affect normal/bright, judgement and  insight intact, normal speech and appearance well-groomed.          Video-Visit Details    Type of service:  Video Visit    Video End Time:4:16 PM    Originating Location (pt. Location): Home    Distant Location (provider location):  [facility]     Platform used for Video Visit: [employer]

## 2021-10-17 ENCOUNTER — HEALTH MAINTENANCE LETTER (OUTPATIENT)
Age: 61
End: 2021-10-17

## 2021-12-01 ENCOUNTER — ALLIED HEALTH/NURSE VISIT (OUTPATIENT)
Dept: ENDOCRINOLOGY | Facility: CLINIC | Age: 61
End: 2021-12-01
Payer: COMMERCIAL

## 2021-12-01 DIAGNOSIS — M81.0 AGE-RELATED OSTEOPOROSIS WITHOUT CURRENT PATHOLOGICAL FRACTURE: Primary | ICD-10-CM

## 2021-12-01 PROCEDURE — 96372 THER/PROPH/DIAG INJ SC/IM: CPT | Performed by: PHARMACIST

## 2021-12-01 PROCEDURE — 99207 PR NO CHARGE NURSE ONLY: CPT

## 2021-12-01 NOTE — PROGRESS NOTES
"Prolia Injection Phone Screen      Screening questions have been asked 2-3 days prior to administration visit for Prolia. If any questions are answered with \"Yes,\" this phone encounter were will routed to ordering provider for further evaluation.     1.  When was the last injection?  05/24/21    2.  Has insurance for this injection been verified?  Yes    3.  Did you experience any new onset achiness or rashes that lasted for over a month with your previous Prolia injection?   No    4.  Do you have a fever over 101?F or a new deep cough that is unusual for you today? No    5.  Have you started any new medications in the last 6 months that you were told could affect your immune system? These may have been prescribed by oncologist, transplant, rheumatology, or dermatology.   No    6.  In the last 6 months have you have gastric bypass or parathyroid surgery?   No    7.  Do you plan dental work requiring drilling into the bone such as implants/extractions or oral surgery in the next 2-3 months?   No    8. Do you have new insurance since the last injection? No    Patient informed if symptoms discussed above present prior to their administration appointment, they are to notify clinic immediately.     Karolina REDMOND RN          The following steps were completed to comply with the REMS program for Prolia:  1. Ordering provider has previously reviewed information in the Medication Guide and Patient Counseling Chart, including the serious risks of Prolia  and the symptoms of each risk and have been advised to seek prompt medical attention if they have signs or symptoms of any of the serious risks.  2. Provided each patient a copy of the Medication Guide and Patient Brochure.  See MAR for administration details.   Indication: Prolia  (denosumab) is a prescription medicine used to treat osteoporosis in patients who:   Are at high risk for fracture, meaning patients who have had a fracture related to osteoporosis, or who " have multiple risk factors for fracture; Cannot use another osteoporosis medicine or other osteoporosis medicines did not work well.   The timeline for early/late injections would be 4 weeks early and any time after the 6 month sadiq. If a patient receives their injection late, then the subsequent injection would be 6 months from the date that they actually received the injection    Have the screening questions been asked prior to this administration? Yes    The following medication was given:     MEDICATION: Denosumab (Prolia) 60 mg/ml SOLN  ROUTE: SQ  SITE: Arm - Left  DOSE: 60mg  LOT #: [id number]  :  Apruve  EXPIRATION DATE:  04/24

## 2021-12-28 ENCOUNTER — MYC MEDICAL ADVICE (OUTPATIENT)
Dept: FAMILY MEDICINE | Facility: CLINIC | Age: 61
End: 2021-12-28
Payer: COMMERCIAL

## 2021-12-28 DIAGNOSIS — Z86.16 HISTORY OF COVID-19: Primary | ICD-10-CM

## 2021-12-29 ENCOUNTER — LAB (OUTPATIENT)
Dept: LAB | Facility: HOSPITAL | Age: 61
End: 2021-12-29
Payer: COMMERCIAL

## 2021-12-29 DIAGNOSIS — Z86.16 HISTORY OF COVID-19: ICD-10-CM

## 2021-12-29 PROCEDURE — 86769 SARS-COV-2 COVID-19 ANTIBODY: CPT

## 2021-12-29 PROCEDURE — 36415 COLL VENOUS BLD VENIPUNCTURE: CPT

## 2021-12-30 LAB
SARS-COV-2 AB SERPL IA-ACNC: >250 U/ML
SARS-COV-2 AB SERPL QL IA: POSITIVE

## 2022-01-14 ENCOUNTER — LAB REQUISITION (OUTPATIENT)
Dept: LAB | Facility: HOSPITAL | Age: 62
End: 2022-01-14

## 2022-01-14 PROCEDURE — U0005 INFEC AGEN DETEC AMPLI PROBE: HCPCS | Performed by: INTERNAL MEDICINE

## 2022-01-15 LAB — SARS-COV-2 RNA RESP QL NAA+PROBE: NEGATIVE

## 2022-01-26 ENCOUNTER — ANCILLARY PROCEDURE (OUTPATIENT)
Dept: MAMMOGRAPHY | Facility: CLINIC | Age: 62
End: 2022-01-26
Attending: FAMILY MEDICINE
Payer: COMMERCIAL

## 2022-01-26 DIAGNOSIS — Z12.31 VISIT FOR SCREENING MAMMOGRAM: ICD-10-CM

## 2022-01-26 PROCEDURE — 77067 SCR MAMMO BI INCL CAD: CPT

## 2022-02-24 ENCOUNTER — LAB (OUTPATIENT)
Dept: LAB | Facility: HOSPITAL | Age: 62
End: 2022-02-24
Payer: COMMERCIAL

## 2022-02-24 ENCOUNTER — TELEPHONE (OUTPATIENT)
Dept: FAMILY MEDICINE | Facility: CLINIC | Age: 62
End: 2022-02-24
Payer: COMMERCIAL

## 2022-02-24 DIAGNOSIS — N39.0 URINARY TRACT INFECTION WITHOUT HEMATURIA, SITE UNSPECIFIED: Primary | ICD-10-CM

## 2022-02-24 DIAGNOSIS — R30.0 DYSURIA: ICD-10-CM

## 2022-02-24 DIAGNOSIS — R30.0 DYSURIA: Primary | ICD-10-CM

## 2022-02-24 LAB
ALBUMIN UR-MCNC: ABNORMAL G/DL
APPEARANCE UR: CLEAR
BILIRUB UR QL STRIP: ABNORMAL
COLOR UR AUTO: ABNORMAL
GLUCOSE UR STRIP-MCNC: NEGATIVE MG/DL
HGB UR QL STRIP: NEGATIVE
KETONES UR STRIP-MCNC: NEGATIVE MG/DL
LEUKOCYTE ESTERASE UR QL STRIP: NEGATIVE
MUCOUS THREADS #/AREA URNS LPF: PRESENT /LPF
NITRATE UR QL: ABNORMAL
PH UR STRIP: 5 [PH] (ref 5–7)
RBC URINE: 1 /HPF
SP GR UR STRIP: 1.02 (ref 1–1.03)
UROBILINOGEN UR STRIP-MCNC: <2 MG/DL
WBC URINE: 132 /HPF

## 2022-02-24 PROCEDURE — 81001 URINALYSIS AUTO W/SCOPE: CPT

## 2022-02-24 PROCEDURE — 87086 URINE CULTURE/COLONY COUNT: CPT

## 2022-02-24 RX ORDER — NITROFURANTOIN 25; 75 MG/1; MG/1
100 CAPSULE ORAL 2 TIMES DAILY
Qty: 14 CAPSULE | Refills: 0 | Status: SHIPPED | OUTPATIENT
Start: 2022-02-24 | End: 2022-03-03

## 2022-02-24 NOTE — TELEPHONE ENCOUNTER
Spoke with patient.    Updated pharmacy to [facility].  Please send any antibiotics such as Macrobid to this pharmacy if needed as they will deliver to her while she is at work. Thank you

## 2022-02-24 NOTE — TELEPHONE ENCOUNTER
Patient called back.  Is at work at [employer].    Would like order placed.  I will go talk to dr burger and call her back if okay.

## 2022-02-24 NOTE — TELEPHONE ENCOUNTER
Patient complaining of urinary frequency and pressure, would like to have urine checked for UTI. Is currently working at [employer] and is wondering if Dr Cervantes could place a order for a UA/UC. If infection is present would like Macrobid called to pharmacy. Call patient at work # with questions or when order is placed.

## 2022-02-26 LAB — BACTERIA UR CULT: NORMAL

## 2022-04-20 ENCOUNTER — OFFICE VISIT (OUTPATIENT)
Dept: OTOLARYNGOLOGY | Facility: CLINIC | Age: 62
End: 2022-04-20
Payer: COMMERCIAL

## 2022-04-20 DIAGNOSIS — H61.23 EXCESSIVE CERUMEN IN BOTH EAR CANALS: Primary | ICD-10-CM

## 2022-04-20 PROCEDURE — 99207 PR NO CHARGE LOS: CPT | Performed by: OTOLARYNGOLOGY

## 2022-04-20 PROCEDURE — 69210 REMOVE IMPACTED EAR WAX UNI: CPT | Performed by: OTOLARYNGOLOGY

## 2022-04-20 NOTE — PROGRESS NOTES
HPI: This patient presents to clinic today for cerumen removal. Has noticed some fullness of the ears and muffled hearing, but denies otalgia, otorrhea, vertigo, change in true hearing or tinnitus. Denies other symptoms.    Past medical history, surgical history, family history, social history, medications, and allergies have been reviewed and are documented above.     Review of Systems: a 10-system review was performed. Symptoms are noted in the HPI and on a scanned document in the computer chart.    Physical Examination:   GEN: no acute distress, alert and oriented  EYES: extraocular movements intact, pupils equal and round. Sclera clear.   EARS: bilateral excess cerumen cleared under binocular microscopy using a forceps. The tympanic membranes are noted to be intact and clear with no signs of infections, effusions, perforations, or retractions.  PULM: breathing comfortably on room air with no stertor or stridor. Chest expansion symmetric.  CARDS: no cyanosis or clubbing, normal carotid pulses    MEDICAL DECISION-MAKING: excess cerumen cleared from both ears under binocular microscopy without difficulty. Hearing restored to baseline. Follow-up PRN.

## 2022-04-20 NOTE — LETTER
4/20/2022         RE: Katina Hoffman  [address] [place] [id number]        Dear Colleague,    Thank you for referring your patient, Katina Hoffman, to the [facility]. Please see a copy of my visit note below.    HPI: This patient presents to clinic today for cerumen removal. Has noticed some fullness of the ears and muffled hearing, but denies otalgia, otorrhea, vertigo, change in true hearing or tinnitus. Denies other symptoms.    Past medical history, surgical history, family history, social history, medications, and allergies have been reviewed and are documented above.     Review of Systems: a 10-system review was performed. Symptoms are noted in the HPI and on a scanned document in the computer chart.    Physical Examination:   GEN: no acute distress, alert and oriented  EYES: extraocular movements intact, pupils equal and round. Sclera clear.   EARS: bilateral excess cerumen cleared under binocular microscopy using a forceps. The tympanic membranes are noted to be intact and clear with no signs of infections, effusions, perforations, or retractions.  PULM: breathing comfortably on room air with no stertor or stridor. Chest expansion symmetric.  CARDS: no cyanosis or clubbing, normal carotid pulses    MEDICAL DECISION-MAKING: excess cerumen cleared from both ears under binocular microscopy without difficulty. Hearing restored to baseline. Follow-up PRN.        Again, thank you for allowing me to participate in the care of your patient.        Sincerely,        Maureen Cespedes MD

## 2022-04-28 ENCOUNTER — TELEPHONE (OUTPATIENT)
Dept: ENDOCRINOLOGY | Facility: CLINIC | Age: 62
End: 2022-04-28
Payer: COMMERCIAL

## 2022-04-28 DIAGNOSIS — M81.0 AGE-RELATED OSTEOPOROSIS WITHOUT CURRENT PATHOLOGICAL FRACTURE: Primary | ICD-10-CM

## 2022-04-28 DIAGNOSIS — T50.905S UNSPECIFIED ADVERSE EFFECT OF DRUG OR MEDICAMENT, SEQUELA: ICD-10-CM

## 2022-04-29 ENCOUNTER — TRANSFERRED RECORDS (OUTPATIENT)
Dept: HEALTH INFORMATION MANAGEMENT | Facility: CLINIC | Age: 62
End: 2022-04-29
Payer: COMMERCIAL

## 2022-05-09 ENCOUNTER — TRANSFERRED RECORDS (OUTPATIENT)
Dept: HEALTH INFORMATION MANAGEMENT | Facility: CLINIC | Age: 62
End: 2022-05-09
Payer: COMMERCIAL

## 2022-05-31 ENCOUNTER — LAB (OUTPATIENT)
Dept: LAB | Facility: HOSPITAL | Age: 62
End: 2022-05-31
Payer: COMMERCIAL

## 2022-05-31 ENCOUNTER — MYC MEDICAL ADVICE (OUTPATIENT)
Dept: FAMILY MEDICINE | Facility: CLINIC | Age: 62
End: 2022-05-31

## 2022-05-31 DIAGNOSIS — R10.13 ABDOMINAL PAIN, EPIGASTRIC: ICD-10-CM

## 2022-05-31 DIAGNOSIS — Z20.822 EXPOSURE TO 2019 NOVEL CORONAVIRUS: Primary | ICD-10-CM

## 2022-05-31 DIAGNOSIS — M81.0 AGE-RELATED OSTEOPOROSIS WITHOUT CURRENT PATHOLOGICAL FRACTURE: ICD-10-CM

## 2022-05-31 DIAGNOSIS — Z20.822 EXPOSURE TO 2019 NOVEL CORONAVIRUS: ICD-10-CM

## 2022-05-31 LAB
ALBUMIN SERPL-MCNC: 4.1 G/DL (ref 3.5–5)
ALP SERPL-CCNC: 62 U/L (ref 45–120)
ALT SERPL W P-5'-P-CCNC: 18 U/L (ref 0–45)
AMYLASE SERPL-CCNC: 156 U/L (ref 5–120)
AST SERPL W P-5'-P-CCNC: 28 U/L (ref 0–40)
BILIRUB DIRECT SERPL-MCNC: 0.4 MG/DL
BILIRUB SERPL-MCNC: 1.3 MG/DL (ref 0–1)
CALCIUM SERPL-MCNC: 9.4 MG/DL (ref 8.5–10.5)
DEPRECATED CALCIDIOL+CALCIFEROL SERPL-MC: 26 UG/L (ref 20–75)
LIPASE SERPL-CCNC: 111 U/L (ref 0–52)
PROT SERPL-MCNC: 6.9 G/DL (ref 6–8)

## 2022-05-31 PROCEDURE — 80076 HEPATIC FUNCTION PANEL: CPT

## 2022-05-31 PROCEDURE — 86769 SARS-COV-2 COVID-19 ANTIBODY: CPT

## 2022-05-31 PROCEDURE — 82306 VITAMIN D 25 HYDROXY: CPT

## 2022-05-31 PROCEDURE — 82150 ASSAY OF AMYLASE: CPT

## 2022-05-31 PROCEDURE — 83690 ASSAY OF LIPASE: CPT

## 2022-05-31 PROCEDURE — 36415 COLL VENOUS BLD VENIPUNCTURE: CPT

## 2022-05-31 PROCEDURE — 82310 ASSAY OF CALCIUM: CPT

## 2022-05-31 NOTE — TELEPHONE ENCOUNTER
Routing to PCP to address lab orders.    Patient requesting a covid-19 spike RBD LESLYE and a titer reflex.    Thanks,  Adonay DOBSON RN

## 2022-06-01 ENCOUNTER — ALLIED HEALTH/NURSE VISIT (OUTPATIENT)
Dept: ENDOCRINOLOGY | Facility: CLINIC | Age: 62
End: 2022-06-01
Payer: COMMERCIAL

## 2022-06-01 DIAGNOSIS — M81.0 OSTEOPOROSIS: Primary | ICD-10-CM

## 2022-06-01 PROCEDURE — 96372 THER/PROPH/DIAG INJ SC/IM: CPT | Performed by: NURSE PRACTITIONER

## 2022-06-01 PROCEDURE — 99207 PR NO CHARGE NURSE ONLY: CPT

## 2022-06-01 NOTE — PROGRESS NOTES
"Prolia Injection Phone Screen      Screening questions have been asked 2-3 days prior to administration visit for Prolia. If any questions are answered with \"Yes,\" this phone encounter were will routed to ordering provider for further evaluation.     1.  When was the last injection?  12/01/2021    2.  Has insurance for this injection been verified?  Yes    3.  Did you experience any new onset achiness or rashes that lasted for over a month with your previous Prolia injection?   No    4.  Do you have a fever over 101?F or a new deep cough that is unusual for you today? No    5.  Have you started any new medications in the last 6 months that you were told could affect your immune system? These may have been prescribed by oncologist, transplant, rheumatology, or dermatology.   No    6.  In the last 6 months have you have gastric bypass or parathyroid surgery?   No    7.  Do you plan dental work requiring drilling into the bone such as implants/extractions or oral surgery in the next 2-3 months?   No    8. Do you have new insurance since the last injection?    Patient informed if symptoms discussed above present prior to their administration appointment, they are to notify clinic immediately.     Jailene Pablo RN          The following steps were completed to comply with the REMS program for Prolia:  1. Ordering provider has previously reviewed information in the Medication Guide and Patient Counseling Chart, including the serious risks of Prolia  and the symptoms of each risk and have been advised to seek prompt medical attention if they have signs or symptoms of any of the serious risks.  2. Provided each patient a copy of the Medication Guide and Patient Brochure.  See MAR for administration details.   Indication: Prolia  (denosumab) is a prescription medicine used to treat osteoporosis in patients who:   Are at high risk for fracture, meaning patients who have had a fracture related to osteoporosis, or who have " multiple risk factors for fracture; Cannot use another osteoporosis medicine or other osteoporosis medicines did not work well.   The timeline for early/late injections would be 4 weeks early and any time after the 6 month sadiq. If a patient receives their injection late, then the subsequent injection would be 6 months from the date that they actually received the injection    Have the screening questions been asked prior to this administration? Yes      The following medication was given:     MEDICATION: Denosumab (Prolia) 60 mg/ml SOLN  ROUTE: SQ  SITE: Arm - Left  DOSE: 60 mg  LOT #: [id number]  :  Veracode  EXPIRATION DATE:  07/2024  NDC#: See MAR

## 2022-06-02 LAB
SARS-COV-2 AB SERPL IA-ACNC: >250 U/ML
SARS-COV-2 AB SERPL QL IA: POSITIVE

## 2022-06-28 NOTE — PROGRESS NOTES
Adilene is a 62 year old who is being evaluated via a billable video visit.      How would you like to obtain your AVS? MyChart  If the video visit is dropped, the invitation should be resent by: Text to cell phone: [phone]  Will anyone else be joining your video visit? [facility]  ENDOCRINOLOGY    Osteoporosis Follow Up 7/6/2022    Katina Hoffman, 1960, [id number]          Reason for visit      1. Osteoporosis without current pathological fracture, unspecified osteoporosis type        History     Katina Hoffman is a very pleasant 62 year old old female who presents for follow up.   SUMMARY:    Katina is contacted today in f/u for Osteoporosis. She continues on Prolia injections without difficulties. She reports no falls in the last year. Her last Dexa Scan showed: Since the previous bone density dated January 30, 2020, there has been a +3.8% change in the bone density of the spine.  Additionally there has been no statistically significant % change in the hips bilaterally.  An increase in/or stability is considered a positive response to treatment.  Since her baseline bone density in 2017, there has been an overall 9% increase in bone density of the lumbar spine.  Additionally, there has been an overall increase of 5.9% in the left total hip and 6.3% in the right total hip. Her most recent Calcium level was 9.3 and Vit D level was 26. She remains active and using her Eliptical.       Risk Factors     The following high- risk conditions have been ruled out: celiac disease, eating disorders, gastric bypass, hyperparathyroidism, inflammatory bowel disease, hyperthyroidism, rheumatoid arthritis, lupus, chronic kidney disease.     Katina Hoffman has the following risk factors: Age, Female gender, , Low BMI, Family history of osteoporosis and Early menopause (<45)     She is not on high risk medications such as glucocorticoids, anti-coagulants, anti-convulsants, chemotherapy.    Patient  deniesHysterectomy, Oophrectomy, Breast cancer and Family history of breast cancer.    Menopause was at age: early 40s.     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    Social History      reports that she has never smoked. She has never used smokeless tobacco. She reports current alcohol use of about 2.0 standard drinks of alcohol per week. She reports that she does not use drugs.      Review of Systems     Patient denies current pain, limited mobility, fractures.   Remainder per HPI.    Answers for HPI/ROS submitted by the patient on 6/28/2022  General Symptoms: No  Skin Symptoms: No  HENT Symptoms: No  EYE SYMPTOMS: No  HEART SYMPTOMS: No  LUNG SYMPTOMS: No  INTESTINAL SYMPTOMS: No  URINARY SYMPTOMS: No  GYNECOLOGIC SYMPTOMS: No  BREAST SYMPTOMS: No  SKELETAL SYMPTOMS: No  BLOOD SYMPTOMS: No  NERVOUS SYSTEM SYMPTOMS: No  MENTAL HEALTH SYMPTOMS: No      Vital Signs     There were no vitals taken for this visit.    Physical Exam     Constitutional:  Well developed, Well nourished  HENT:  Normocephalic,   Neck: normal in appearance  Eyes:  PERRL, Conjunctiva pink  Respiratory:  No respiratory distress  Skin: No acanthosis nigricans, lipoatrophy or lipodystrophy  Neurologic:  Alert & oriented x 3, nonfocal  Psychiatric:  Affect, Mood, Insight  appropriate        Assessment     1. Osteoporosis without current pathological fracture, unspecified osteoporosis type        Plan     Adilene is due for another Dexa scan in January. She will remain on the Prolia injections and will f/u with me in 1 year.         Katelyn Macias NP  [facility]  7/6/2022  6:28 AM      Current Medications     Outpatient Medications Prior to Visit   Medication Sig Dispense Refill     acyclovir (ZOVIRAX) 200 MG capsule Take 2 capsules (400 mg) by mouth every 12 hours 120 capsule 3     hydrocortisone (PROCTOSOL HC) 2.5 % rectal cream [HYDROCORTISONE (PROCTOSOL HC) 2.5 % RECTAL CREAM] Apply to the affected area twice daily as needed. 28.35 g 0     ketorolac (TORADOL) 10 MG tablet Take 1 tablet (10 mg) by mouth every 6 hours as needed for moderate pain 30 tablet 0     naproxen-diphenhydramine (ALEVE PM) 220-25 mg Tab [NAPROXEN-DIPHENHYDRAMINE (ALEVE PM) 220-25 MG TAB] Take 1 tablet by mouth daily as needed.       ondansetron (ZOFRAN-ODT) 4 MG disintegrating tablet [ONDANSETRON (ZOFRAN-ODT) 4 MG DISINTEGRATING TABLET] DISSOLVE 1 TABLET(4 MG) ON THE TONGUE EVERY 8 HOURS AS NEEDED FOR NAUSEA 12 tablet 1     SUMAtriptan (IMITREX) 50 MG tablet [SUMATRIPTAN (IMITREX) 50 MG TABLET]        aspirin 325 MG tablet [ASPIRIN 325 MG TABLET] Take 325 mg by mouth daily.       cyclobenzaprine (FLEXERIL) 5 MG tablet [CYCLOBENZAPRINE (FLEXERIL) 5 MG TABLET] 5-10mg every 8 hours as needed for muscle spasm 30 tablet 0     ergocalciferol (ERGOCALCIFEROL) 1,250 mcg (50,000 unit) capsule [ERGOCALCIFEROL (ERGOCALCIFEROL) 1,250 MCG (50,000 UNIT) CAPSULE] Take 1 capsule (50,000 Units total) by mouth once a week. 12 capsule 1     ketorolac (TORADOL) 10 mg tablet [KETOROLAC (TORADOL) 10 MG TABLET] Take 1 tablet (10 mg total) by mouth every 6 (six) hours as needed. 30 tablet 0     lidocaine (LIDODERM) 5 % [LIDOCAINE (LIDODERM) 5 %] 12 hours on and 12 hours off 15 patch 0     omeprazole (PRILOSEC) 20 MG DR capsule         omeprazole (PRILOSEC) 40 MG DR capsule Take 1 capsule (40 mg) by mouth daily 30 capsule 3     TiZANidine (ZANAFLEX) 2 MG capsule [TIZANIDINE (ZANAFLEX) 2 MG CAPSULE] Take 1 capsule (2 mg total) by mouth 3 (three) times a day. 20 capsule 0     triamcinolone (KENALOG) 0.1 % cream [TRIAMCINOLONE (KENALOG) 0.1 % CREAM] Apply 1 application topically daily as needed. 30 g 3     vitamin D2 (ERGOCALCIFEROL) 44112 units (1250 mcg) capsule Take 1 capsule (50,000 Units) by mouth once a week 12 capsule 0     Facility-Administered Medications Prior to Visit   Medication Dose Route Frequency Provider Last Rate Last Admin     denosumab (PROLIA) injection 60 mg  60 mg Subcutaneous Q6 Months Adina Vera, PharmD   60 mg at 12/01/21 1550         Lab Results     TSH   Date Value Ref Range Status   01/05/2021 3.15 0.30 - 5.00 uIU/mL Final     Phosphorus   Date Value Ref Range Status   04/13/2018 3.4 2.5 - 4.5 mg/dL Final           Imaging Results   Last DEXA scan:  No valid procedures specified.      Study Result    Narrative & Impression   4/5/2021        RE: Katina Hoffman  YOB: 1960           Dear Katelyn Macias,     Patient Profile:  60 y.o. female, postmenopausal, is here for the follow up bone density test.   History of fractures - Yes;  Ribs and Shoulder. Family history of osteoporosis - Yes;  mother.  Family history of hip fracture: None. Smoking history - No. Osteoporosis treatment past -  Yes;  Bisphosphonates. Osteoporosis treatment current - Yes;    Prolia.  Chronic medical problems - None. High risk medications -  None.        Assessment:     1. The spine bone density L1-L4 with T-score -1.8.  2. Femoral bone densities show left femoral neck T- score -2.1 and right femoral neck T-score -1.8.  3. Trabecular bone score indicates good trabecular bone architecture.        60 y.o. female with LOW BONE DENSITY (OSTEOPENIA) and MODERATE fracture risk, adjusted for the TBS, with major osteoporotic fracture risk  13.1% and hip fracture risk 1.8%.      Since the previous bone density dated January 30, 2020, there has been a +3.8% change in the bone density of the spine.  Additionally there has been no statistically significant % change in the hips bilaterally.  An increase in/or stability is   considered a positive response to treatment.  Since his baseline bone density in 2017, there has been an overall 9% increase in bone density of the lumbar spine.  Additionally, there has been an overall increase of 5.9% in the left total hip and 6.3% in   the right total hip.     Recommendations:  Appropriate calcium, vitamin D supplements, along with balance and weight bearing exercise recommended.  Additionally, the transition from Prolia to an oral antiresorptive agent can be considered with follow up bone density scan in 1 to 2 years.        Bone densitometry was performed on your patient using our iPling densitometer. The results are summarized and a copy of the actual scans are included for your review. In conformity with the International Society of Clinical Densitometry's most   recent position statement for DXA interpretation (2015), the diagnosis will be made on the lowest measured T-score of the lumbar spine, femoral neck, total proximal femur or 33% radius. Note the change in terminology for diagnostic classification from   OSTEOPENIA to LOW BONE MASS. All trending for sequential exams will be done using multiple vertebrae or the total proximal femur. Fracture risk is based on the WHO Fracture Risk Assessment Tool (FRAX). If additional information is needed or if you would   like to discuss the results, please do not hesitate to call me.         Thank you for referring this patient to [facility]. We are happy to be of service in support of you and your practice. If you have any questions or suggestions to improve our service, please call me at [phone].      Sincerely,      Erika  LAKEISHA Stock  [facility]         Video-Visit Details    Video Start Time:1400    Type of service:  Video Visit    Video End Time:1420    Originating Location (pt. Location): Home    Distant Location (provider location):  [facility]     Platform used for Video Visit: ActimagineWell    Date of last OV: 6/01/21  Reason for Visit: Osteoporosis    Dexa Scan: 4/05/21  Last Prolia Injection: 6/01/22

## 2022-07-01 DIAGNOSIS — M79.676 PAIN OF TOE, UNSPECIFIED LATERALITY: ICD-10-CM

## 2022-07-01 RX ORDER — KETOROLAC TROMETHAMINE 10 MG/1
10 TABLET, FILM COATED ORAL EVERY 6 HOURS PRN
Qty: 30 TABLET | Refills: 0 | Status: SHIPPED | OUTPATIENT
Start: 2022-07-01 | End: 2022-10-18

## 2022-07-01 NOTE — TELEPHONE ENCOUNTER
Pending Prescriptions:                       Disp   Refills    ketorolac (TORADOL) 10 MG tablet          30 tab*0            Sig: Take 1 tablet (10 mg) by mouth every 6 hours as           needed for moderate pain    Pt is calling requesting a refill of her Toradol for her headaches. Pt states that they have been really bad on and off all week. Adilene recently had some dental work done and thinks that is contributing. Please send refill to pharmacy on file if appropriate otherwise contact patient to discuss further.

## 2022-07-05 ENCOUNTER — VIRTUAL VISIT (OUTPATIENT)
Dept: ENDOCRINOLOGY | Facility: CLINIC | Age: 62
End: 2022-07-05
Payer: COMMERCIAL

## 2022-07-05 DIAGNOSIS — M81.0 OSTEOPOROSIS WITHOUT CURRENT PATHOLOGICAL FRACTURE, UNSPECIFIED OSTEOPOROSIS TYPE: Primary | ICD-10-CM

## 2022-07-05 PROCEDURE — 99214 OFFICE O/P EST MOD 30 MIN: CPT | Mod: 95 | Performed by: NURSE PRACTITIONER

## 2022-07-05 NOTE — Clinical Note
"    7/5/2022         RE: Katina Hoffman  [address] [place] [id number]        Dear Colleague,    Thank you for referring your patient, Katina Hoffman, to the [facility]. Please see a copy of my visit note below.    Adilene is a 62 year old who is being evaluated via a billable video visit.      How would you like to obtain your AVS? MyChart  If the video visit is dropped, the invitation should be resent by: Text to cell phone: [phone]  Will anyone else be joining your video visit? No  {If patient encounters technical issues they should call [phone] :[id number]}      Video-Visit Details    Video Start Time: {video visit start/end time for provider to select:830967}    Type of service:  Video Visit    Video End Time:{video visit start/end time for provider to select:401077}    Originating Location (pt. Location): {video visit patient location:810283::\"Home\"}    Distant Location (provider location):  [facility]     Platform used for Video Visit: {Virtual Visit Platforms:233703::\"ElectroCore\"}    Date of last OV: 6/01/21  Reason for Visit: Osteoporosis    Dexa Scan: 4/05/21  Last Prolia Injection: 6/01/22    Answers for HPI/ROS submitted by the patient on 6/28/2022  General Symptoms: No  Skin Symptoms: No  HENT Symptoms: No  EYE SYMPTOMS: No  HEART SYMPTOMS: No  LUNG SYMPTOMS: No  INTESTINAL SYMPTOMS: No  URINARY SYMPTOMS: No  GYNECOLOGIC SYMPTOMS: No  BREAST SYMPTOMS: No  SKELETAL SYMPTOMS: No  BLOOD SYMPTOMS: No  NERVOUS SYSTEM SYMPTOMS: No  MENTAL HEALTH SYMPTOMS: No          Again, thank you for allowing me to participate in the care of your patient.        Sincerely,        Katelyn Macias NP  "

## 2022-07-24 ENCOUNTER — HEALTH MAINTENANCE LETTER (OUTPATIENT)
Age: 62
End: 2022-07-24

## 2022-09-30 ENCOUNTER — MYC MEDICAL ADVICE (OUTPATIENT)
Dept: FAMILY MEDICINE | Facility: CLINIC | Age: 62
End: 2022-09-30

## 2022-09-30 DIAGNOSIS — Z12.11 COLON CANCER SCREENING: Primary | ICD-10-CM

## 2022-09-30 DIAGNOSIS — Z91.030 BEE STING ALLERGY: ICD-10-CM

## 2022-10-02 ENCOUNTER — HEALTH MAINTENANCE LETTER (OUTPATIENT)
Age: 62
End: 2022-10-02

## 2022-10-03 RX ORDER — EPINEPHRINE 0.3 MG/.3ML
0.3 INJECTION SUBCUTANEOUS PRN
Qty: 2 EACH | Refills: 1 | Status: SHIPPED | OUTPATIENT
Start: 2022-10-03

## 2022-10-16 ENCOUNTER — MYC MEDICAL ADVICE (OUTPATIENT)
Dept: FAMILY MEDICINE | Facility: CLINIC | Age: 62
End: 2022-10-16

## 2022-10-16 DIAGNOSIS — Z20.822 EXPOSURE TO 2019 NOVEL CORONAVIRUS: Primary | ICD-10-CM

## 2022-10-16 DIAGNOSIS — M79.676 PAIN OF TOE, UNSPECIFIED LATERALITY: ICD-10-CM

## 2022-10-18 RX ORDER — KETOROLAC TROMETHAMINE 10 MG/1
10 TABLET, FILM COATED ORAL EVERY 6 HOURS PRN
Qty: 30 TABLET | Refills: 1 | Status: SHIPPED | OUTPATIENT
Start: 2022-10-18

## 2022-10-20 ENCOUNTER — LAB (OUTPATIENT)
Dept: LAB | Facility: HOSPITAL | Age: 62
End: 2022-10-20
Payer: COMMERCIAL

## 2022-10-20 DIAGNOSIS — Z20.822 EXPOSURE TO 2019 NOVEL CORONAVIRUS: ICD-10-CM

## 2022-10-20 PROCEDURE — 86769 SARS-COV-2 COVID-19 ANTIBODY: CPT

## 2022-10-20 PROCEDURE — 36415 COLL VENOUS BLD VENIPUNCTURE: CPT

## 2022-10-21 LAB
NONINV COLON CA DNA+OCC BLD SCRN STL QL: NEGATIVE
SARS-COV-2 AB SERPL IA-ACNC: >250 U/ML
SARS-COV-2 AB SERPL QL IA: POSITIVE

## 2022-11-23 ENCOUNTER — LAB REQUISITION (OUTPATIENT)
Dept: LAB | Facility: CLINIC | Age: 62
End: 2022-11-23

## 2022-11-23 DIAGNOSIS — Z13.1 ENCOUNTER FOR SCREENING FOR DIABETES MELLITUS: ICD-10-CM

## 2022-11-23 DIAGNOSIS — Z13.220 ENCOUNTER FOR SCREENING FOR LIPOID DISORDERS: ICD-10-CM

## 2022-11-23 DIAGNOSIS — Z01.419 ENCOUNTER FOR GYNECOLOGICAL EXAMINATION (GENERAL) (ROUTINE) WITHOUT ABNORMAL FINDINGS: ICD-10-CM

## 2022-11-23 PROCEDURE — 83036 HEMOGLOBIN GLYCOSYLATED A1C: CPT | Performed by: OBSTETRICS & GYNECOLOGY

## 2022-11-23 PROCEDURE — G0124 SCREEN C/V THIN LAYER BY MD: HCPCS | Performed by: PATHOLOGY

## 2022-11-23 PROCEDURE — G0145 SCR C/V CYTO,THINLAYER,RESCR: HCPCS | Performed by: OBSTETRICS & GYNECOLOGY

## 2022-11-23 PROCEDURE — 80061 LIPID PANEL: CPT | Performed by: OBSTETRICS & GYNECOLOGY

## 2022-11-23 PROCEDURE — 87624 HPV HI-RISK TYP POOLED RSLT: CPT | Performed by: OBSTETRICS & GYNECOLOGY

## 2022-11-24 LAB
CHOLEST SERPL-MCNC: 222 MG/DL
HBA1C MFR BLD: 5.6 %
HDLC SERPL-MCNC: 98 MG/DL
LDLC SERPL CALC-MCNC: 114 MG/DL
NONHDLC SERPL-MCNC: 124 MG/DL
TRIGL SERPL-MCNC: 48 MG/DL

## 2022-11-29 LAB
BKR LAB AP GYN ADEQUACY: ABNORMAL
BKR LAB AP GYN INTERPRETATION: ABNORMAL
BKR LAB AP HPV REFLEX: ABNORMAL
BKR LAB AP LMP: ABNORMAL
BKR LAB AP PREVIOUS ABNL DX: ABNORMAL
BKR LAB AP PREVIOUS ABNORMAL: ABNORMAL
PATH REPORT.COMMENTS IMP SPEC: ABNORMAL
PATH REPORT.COMMENTS IMP SPEC: ABNORMAL
PATH REPORT.RELEVANT HX SPEC: ABNORMAL

## 2022-11-30 LAB
HUMAN PAPILLOMA VIRUS 16 DNA: NEGATIVE
HUMAN PAPILLOMA VIRUS 18 DNA: POSITIVE
HUMAN PAPILLOMA VIRUS FINAL DIAGNOSIS: ABNORMAL
HUMAN PAPILLOMA VIRUS OTHER HR: POSITIVE

## 2022-12-07 ENCOUNTER — ALLIED HEALTH/NURSE VISIT (OUTPATIENT)
Dept: ENDOCRINOLOGY | Facility: CLINIC | Age: 62
End: 2022-12-07
Payer: COMMERCIAL

## 2022-12-07 DIAGNOSIS — T50.905S UNSPECIFIED ADVERSE EFFECT OF DRUG OR MEDICAMENT, SEQUELA: ICD-10-CM

## 2022-12-07 DIAGNOSIS — M81.0 OSTEOPOROSIS WITHOUT CURRENT PATHOLOGICAL FRACTURE, UNSPECIFIED OSTEOPOROSIS TYPE: Primary | ICD-10-CM

## 2022-12-07 PROCEDURE — 99207 PR NO CHARGE NURSE ONLY: CPT

## 2022-12-07 PROCEDURE — 96372 THER/PROPH/DIAG INJ SC/IM: CPT | Performed by: PHARMACIST

## 2022-12-07 NOTE — PROGRESS NOTES
"Prolia Injection Phone Screen      Screening questions have been asked 2-3 days prior to administration visit for Prolia. If any questions are answered with \"Yes,\" this phone encounter were will routed to ordering provider for further evaluation.     1.  When was the last injection?  06/01/22    2.  Has insurance for this injection been verified?  Yes    3.  Did you experience any new onset achiness or rashes that lasted for over a month with your previous Prolia injection?   No    4.  Do you have a fever over 101?F or a new deep cough that is unusual for you today? No    5.  Have you started any new medications in the last 6 months that you were told could affect your immune system? These may have been prescribed by oncologist, transplant, rheumatology, or dermatology.   No    6.  In the last 6 months have you have gastric bypass or parathyroid surgery?   No    7.  Do you plan dental work requiring drilling into the bone such as implants/extractions or oral surgery in the next 2-3 months?   No    8. Do you have new insurance since the last injection?    Patient informed if symptoms discussed above present prior to their administration appointment, they are to notify clinic immediately.     Karolina REDMOND RN          The following steps were completed to comply with the REMS program for Prolia:  1. Ordering provider has previously reviewed information in the Medication Guide and Patient Counseling Chart, including the serious risks of Prolia  and the symptoms of each risk and have been advised to seek prompt medical attention if they have signs or symptoms of any of the serious risks.  2. Provided each patient a copy of the Medication Guide and Patient Brochure.  See MAR for administration details.   Indication: Prolia  (denosumab) is a prescription medicine used to treat osteoporosis in patients who:   Are at high risk for fracture, meaning patients who have had a fracture related to osteoporosis, or who have " multiple risk factors for fracture; Cannot use another osteoporosis medicine or other osteoporosis medicines did not work well.   The timeline for early/late injections would be 4 weeks early and any time after the 6 month sadiq. If a patient receives their injection late, then the subsequent injection would be 6 months from the date that they actually received the injection    Have the screening questions been asked prior to this administration? Yes    The following medication was given:     MEDICATION: Denosumab (Prolia) 60 mg/ml SOLN  ROUTE: SQ  SITE: Arm - Left  DOSE: 60 mg   LOT #: [id number]  :  Heyy  EXPIRATION DATE:  03/31/24

## 2022-12-27 ENCOUNTER — LAB REQUISITION (OUTPATIENT)
Dept: LAB | Facility: CLINIC | Age: 62
End: 2022-12-27

## 2022-12-27 DIAGNOSIS — R87.610 ATYPICAL SQUAMOUS CELLS OF UNDETERMINED SIGNIFICANCE ON CYTOLOGIC SMEAR OF CERVIX (ASC-US): ICD-10-CM

## 2022-12-27 PROCEDURE — 88305 TISSUE EXAM BY PATHOLOGIST: CPT | Performed by: PATHOLOGY

## 2022-12-30 LAB
PATH REPORT.COMMENTS IMP SPEC: NORMAL
PATH REPORT.COMMENTS IMP SPEC: NORMAL
PATH REPORT.FINAL DX SPEC: NORMAL
PATH REPORT.GROSS SPEC: NORMAL
PATH REPORT.MICROSCOPIC SPEC OTHER STN: NORMAL
PATH REPORT.MICROSCOPIC SPEC OTHER STN: NORMAL
PATH REPORT.RELEVANT HX SPEC: NORMAL
PHOTO IMAGE: NORMAL

## 2023-02-03 ENCOUNTER — ANCILLARY PROCEDURE (OUTPATIENT)
Dept: MAMMOGRAPHY | Facility: CLINIC | Age: 63
End: 2023-02-03
Attending: FAMILY MEDICINE
Payer: COMMERCIAL

## 2023-02-03 DIAGNOSIS — Z12.31 VISIT FOR SCREENING MAMMOGRAM: ICD-10-CM

## 2023-02-03 PROCEDURE — 77067 SCR MAMMO BI INCL CAD: CPT

## 2023-03-17 ENCOUNTER — TRANSFERRED RECORDS (OUTPATIENT)
Dept: HEALTH INFORMATION MANAGEMENT | Facility: CLINIC | Age: 63
End: 2023-03-17

## 2023-04-04 ENCOUNTER — LAB (OUTPATIENT)
Dept: LAB | Facility: HOSPITAL | Age: 63
End: 2023-04-04
Payer: COMMERCIAL

## 2023-04-04 DIAGNOSIS — M81.0 OSTEOPOROSIS WITHOUT CURRENT PATHOLOGICAL FRACTURE, UNSPECIFIED OSTEOPOROSIS TYPE: ICD-10-CM

## 2023-04-04 LAB — CALCIUM SERPL-MCNC: 9.7 MG/DL (ref 8.8–10.2)

## 2023-04-04 PROCEDURE — 82310 ASSAY OF CALCIUM: CPT

## 2023-04-04 PROCEDURE — 82306 VITAMIN D 25 HYDROXY: CPT

## 2023-04-04 PROCEDURE — 36415 COLL VENOUS BLD VENIPUNCTURE: CPT

## 2023-04-05 LAB — DEPRECATED CALCIDIOL+CALCIFEROL SERPL-MC: 21 UG/L (ref 20–75)

## 2023-04-07 ENCOUNTER — HOSPITAL ENCOUNTER (OUTPATIENT)
Dept: BONE DENSITY | Facility: HOSPITAL | Age: 63
Discharge: HOME | End: 2023-04-07
Attending: NURSE PRACTITIONER | Admitting: NURSE PRACTITIONER
Payer: COMMERCIAL

## 2023-04-07 DIAGNOSIS — M81.0 OSTEOPOROSIS WITHOUT CURRENT PATHOLOGICAL FRACTURE, UNSPECIFIED OSTEOPOROSIS TYPE: ICD-10-CM

## 2023-04-07 PROCEDURE — 77080 DXA BONE DENSITY AXIAL: CPT

## 2023-05-17 ENCOUNTER — TELEPHONE (OUTPATIENT)
Dept: ENDOCRINOLOGY | Facility: CLINIC | Age: 63
End: 2023-05-17
Payer: COMMERCIAL

## 2023-05-17 DIAGNOSIS — M81.0 AGE-RELATED OSTEOPOROSIS WITHOUT CURRENT PATHOLOGICAL FRACTURE: Primary | ICD-10-CM

## 2023-05-17 DIAGNOSIS — Z92.29 PERSONAL HISTORY OF DRUG THERAPY: ICD-10-CM

## 2023-05-31 ENCOUNTER — LAB (OUTPATIENT)
Dept: LAB | Facility: HOSPITAL | Age: 63
End: 2023-05-31
Payer: COMMERCIAL

## 2023-05-31 DIAGNOSIS — Z92.29 PERSONAL HISTORY OF DRUG THERAPY: ICD-10-CM

## 2023-05-31 DIAGNOSIS — M81.0 AGE-RELATED OSTEOPOROSIS WITHOUT CURRENT PATHOLOGICAL FRACTURE: ICD-10-CM

## 2023-05-31 LAB
CALCIUM SERPL-MCNC: 9.4 MG/DL (ref 8.8–10.2)
CREAT SERPL-MCNC: 0.61 MG/DL (ref 0.51–0.95)
GFR SERPL CREATININE-BSD FRML MDRD: >90 ML/MIN/1.73M2

## 2023-05-31 PROCEDURE — 82565 ASSAY OF CREATININE: CPT

## 2023-05-31 PROCEDURE — 36415 COLL VENOUS BLD VENIPUNCTURE: CPT

## 2023-05-31 PROCEDURE — 82310 ASSAY OF CALCIUM: CPT

## 2023-06-07 ENCOUNTER — ALLIED HEALTH/NURSE VISIT (OUTPATIENT)
Dept: ENDOCRINOLOGY | Facility: CLINIC | Age: 63
End: 2023-06-07
Payer: COMMERCIAL

## 2023-06-07 DIAGNOSIS — M81.0 OSTEOPOROSIS: Primary | ICD-10-CM

## 2023-06-07 PROCEDURE — 99207 PR NO CHARGE NURSE ONLY: CPT

## 2023-06-07 PROCEDURE — 96372 THER/PROPH/DIAG INJ SC/IM: CPT | Performed by: NURSE PRACTITIONER

## 2023-06-07 NOTE — PROGRESS NOTES
"Prolia Injection Phone Screen      Screening questions have been asked 2-3 days prior to administration visit for Prolia. If any questions are answered with \"Yes,\" this phone encounter were will routed to ordering provider for further evaluation.     1.  When was the last injection?  12/7/22    2.  Has insurance for this injection been verified?  Yes    3.  Did you experience any new onset achiness or rashes that lasted for over a month with your previous Prolia injection?   No    4.  Do you have a fever over 101?F or a new deep cough that is unusual for you today? No    5.  Have you started any new medications in the last 6 months that you were told could affect your immune system? These may have been prescribed by oncologist, transplant, rheumatology, or dermatology.   No    6.  In the last 6 months have you have gastric bypass or parathyroid surgery?   No    7.  Do you plan dental work requiring drilling into the bone such as implants/extractions or oral surgery in the next 2-3 months?   No    8. Do you have new insurance since the last injection?    Patient informed if symptoms discussed above present prior to their administration appointment, they are to notify clinic immediately.     Jailene Pablo RN          The following steps were completed to comply with the REMS program for Prolia:  1. Ordering provider has previously reviewed information in the Medication Guide and Patient Counseling Chart, including the serious risks of Prolia  and the symptoms of each risk and have been advised to seek prompt medical attention if they have signs or symptoms of any of the serious risks.  2. Provided each patient a copy of the Medication Guide and Patient Brochure.  See MAR for administration details.   Indication: Prolia  (denosumab) is a prescription medicine used to treat osteoporosis in patients who:   Are at high risk for fracture, meaning patients who have had a fracture related to osteoporosis, or who have " multiple risk factors for fracture; Cannot use another osteoporosis medicine or other osteoporosis medicines did not work well.   The timeline for early/late injections would be 4 weeks early and any time after the 6 month sadiq. If a patient receives their injection late, then the subsequent injection would be 6 months from the date that they actually received the injection    Have the screening questions been asked prior to this administration? Yes      Clinic Administered Medication Documentation      Prolia Documentation    Indication: Prolia  (denosumab) is a prescription medicine used to treat osteoporosis in patients who:     Are at high risk for fracture, meaning patients who have had a fracture related to osteoporosis, or who have multiple risk factors for fracture.    Cannot use another osteoporosis medicine or other osteoporosis medicines did not work well.    The timeline for early/late injections would be 4 weeks early and any time after the 6 month sadiq. If a patient receives their injection late, then the subsequent injection would be 6 months from the date that they actually received the injection.    When was the last injection?  22  Was the last injection at least 6 months ago? Yes  Has the prior authorization been completed?  Yes  Is there an active order (written within the past 365 days, with administrations remaining, not ) in the chart?  Yes  Patient denies any dental work involving the bone (e.g. tooth extraction or dental implants) in the past 4 weeks?  Yes  Patient denies plans for any dental work involving the bone (e.g. tooth extraction or dental implants) in the next 4 weeks? Yes    The following steps were completed to comply with the REMS program for Prolia:    Reviewed information in the Medication Guide and Patient Counseling Chart, including the serious risks of Prolia  and the symptoms of each risk.    Advised patient to seek prompt medical attention if they have signs  or symptoms of any of the serious risks.    Provided each patient a copy of the Medication Guide and Patient Brochure.      Prior to injection, verified patient identity using patient's name and date of birth. Medication was administered. Please see MAR and medication order for additional information. Patient instructed to remain in clinic for 15 minutes and report any adverse reaction to staff immediately.    Vial/Syringe: Syringe  Was this medication supplied by the patient? No  Verified that the patient has refills remaining in their prescription.

## 2023-06-16 ENCOUNTER — TRANSFERRED RECORDS (OUTPATIENT)
Dept: HEALTH INFORMATION MANAGEMENT | Facility: CLINIC | Age: 63
End: 2023-06-16
Payer: COMMERCIAL

## 2023-06-30 ENCOUNTER — TRANSFERRED RECORDS (OUTPATIENT)
Dept: HEALTH INFORMATION MANAGEMENT | Facility: CLINIC | Age: 63
End: 2023-06-30
Payer: COMMERCIAL

## 2023-07-10 ASSESSMENT — ENCOUNTER SYMPTOMS
NIGHT SWEATS: 1
DECREASED LIBIDO: 0
DIZZINESS: 0
WEAKNESS: 0
EYE WATERING: 0
WEIGHT LOSS: 0
LOSS OF CONSCIOUSNESS: 0
POOR WOUND HEALING: 0
SKIN CHANGES: 0
BLOATING: 1
TINGLING: 1
HOT FLASHES: 0
JAUNDICE: 0
HEADACHES: 0
FATIGUE: 1
VOMITING: 0
HEARTBURN: 1
SEIZURES: 0
INCREASED ENERGY: 0
DECREASED APPETITE: 0
BLOOD IN STOOL: 0
PARALYSIS: 0
TREMORS: 0
CHILLS: 0
BOWEL INCONTINENCE: 0
WEIGHT GAIN: 0
DISTURBANCES IN COORDINATION: 0
RECTAL PAIN: 0
EYE IRRITATION: 1
NAUSEA: 0
NUMBNESS: 1
ALTERED TEMPERATURE REGULATION: 1
CONSTIPATION: 1
EYE REDNESS: 0
DOUBLE VISION: 0
MEMORY LOSS: 0
NAIL CHANGES: 0
SPEECH CHANGE: 0
POLYDIPSIA: 0
DIARRHEA: 0
FEVER: 0
EYE PAIN: 0
HALLUCINATIONS: 0
ABDOMINAL PAIN: 1
POLYPHAGIA: 0

## 2023-07-11 ENCOUNTER — VIRTUAL VISIT (OUTPATIENT)
Dept: ENDOCRINOLOGY | Facility: CLINIC | Age: 63
End: 2023-07-11
Payer: COMMERCIAL

## 2023-07-11 DIAGNOSIS — M81.8 OTHER OSTEOPOROSIS WITHOUT CURRENT PATHOLOGICAL FRACTURE: Primary | ICD-10-CM

## 2023-07-11 DIAGNOSIS — R63.5 WEIGHT GAIN: ICD-10-CM

## 2023-07-11 DIAGNOSIS — E55.9 VITAMIN D DEFICIENCY: ICD-10-CM

## 2023-07-11 PROCEDURE — 99214 OFFICE O/P EST MOD 30 MIN: CPT | Mod: VID | Performed by: NURSE PRACTITIONER

## 2023-07-11 NOTE — LETTER
7/11/2023         RE: Katina Hoffman  [address] [place] [id number]        Dear Colleague,    Thank you for referring your patient, Katina Hoffman, to the [facility]. Please see a copy of my visit note below.      [facility]  ENDOCRINOLOGY    Osteoporosis Follow Up 7/11/2023    Katina Hoffman, 1960, [id number]          Reason for visit      1. Other osteoporosis without current pathological fracture    2. Vitamin D deficiency    3. Weight gain        History     Katina Hoffman is a very pleasant 63 year old old female who presents for follow up.   SUMMARY:     Katina is contacted today in f/u for Osteoporosis. She continues on Prolia injections without difficulties. She reports no falls in the last year. Her last Dexa Scan showed: INTERVAL CHANGE -There has been a 2.4% increase in lumbar spine BMD. -There has also been a 1.7% increase in hip BMD.  Her most recent Calcium level was 9.4 and Vit D level was 21.  She remains active and using her Eliptical.     Vit D level noted in above. She is not currently taking any in supplementation. This is very low normal.    Pt is reporting wide fluctuations in her weight without change in her dietary practice. She is also having temperature regulation problems, predominant being hot flashes.      Risk Factors     The following high- risk conditions have been ruled out: celiac disease, eating disorders, gastric bypass, hyperparathyroidism, inflammatory bowel disease, hyperthyroidism, rheumatoid arthritis, lupus, chronic kidney disease.     Katina Hoffman has the following risk factors: Age, Female gender, , Low BMI, Family history of osteoporosis and Early menopause (<45)     She is not on high risk medications such as glucocorticoids, anti-coagulants, anti-convulsants, chemotherapy.    Patient deniesHysterectomy, Oophrectomy, Breast cancer and Family history of breast cancer.    Menopause was at age: early 40s.     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    Social History      reports that she has never smoked. She has never used smokeless tobacco. She reports current alcohol use of about 2.0 standard drinks of alcohol per week. She reports that she does not use drugs.      Review of Systems     Patient denies current pain, limited mobility, fractures.   Remainder per HPI.      Vital Signs     There were no vitals taken for this visit.    Physical Exam     Constitutional:  Well developed, Well nourished  HENT:  Normocephalic,   Neck: normal in appearance  Eyes:  PERRL, Conjunctiva pink  Respiratory:  No respiratory distress  Skin: No acanthosis nigricans, lipoatrophy or lipodystrophy  Neurologic:  Alert & oriented x 3, nonfocal  Psychiatric:  Affect, Mood, Insight appropriate        Assessment     1. Other osteoporosis without current pathological fracture    2. Vitamin D deficiency    3. Weight gain        Plan     Pt will remain on the Prolia injections and f/u with me in 1 year.     Recommend taking 2000 international unit(s) Vit D daily.    Will check her Thyroid labs to see if there is some indication of why she is having weight and temp regulation difficulties.      Katelyn Macias NP   Endocrinology  7/11/2023  10:09 AM      Current Medications     Outpatient  Medications Prior to Visit   Medication Sig Dispense Refill     acyclovir (ZOVIRAX) 200 MG capsule Take 2 capsules (400 mg) by mouth every 12 hours 120 capsule 3     EPINEPHrine (ANY BX GENERIC EQUIV) 0.3 MG/0.3ML injection 2-pack Inject 0.3 mLs (0.3 mg) into the muscle as needed for anaphylaxis May repeat one time in 5-15 minutes if response to initial dose is inadequate. 2 each 1     hydrocortisone (PROCTOSOL HC) 2.5 % rectal cream [HYDROCORTISONE (PROCTOSOL HC) 2.5 % RECTAL CREAM] Apply to the affected area twice daily as needed. 28.35 g 0     ketorolac (TORADOL) 10 MG tablet Take 1 tablet (10 mg) by mouth every 6 hours as needed for moderate pain 30 tablet 1     naproxen-diphenhydramine (ALEVE PM) 220-25 mg Tab [NAPROXEN-DIPHENHYDRAMINE (ALEVE PM) 220-25 MG TAB] Take 1 tablet by mouth daily as needed.       ondansetron (ZOFRAN-ODT) 4 MG disintegrating tablet [ONDANSETRON (ZOFRAN-ODT) 4 MG DISINTEGRATING TABLET] DISSOLVE 1 TABLET(4 MG) ON THE TONGUE EVERY 8 HOURS AS NEEDED FOR NAUSEA 12 tablet 1     SUMAtriptan (IMITREX) 50 MG tablet [SUMATRIPTAN (IMITREX) 50 MG TABLET]        Facility-Administered Medications Prior to Visit   Medication Dose Route Frequency Provider Last Rate Last Admin     denosumab (PROLIA) injection 60 mg  60 mg Subcutaneous Q6 Months Katelyn Macias NP   60 mg at 06/07/23 1529     denosumab (PROLIA) injection 60 mg  60 mg Subcutaneous Q6 Months Adina Vera PharmD   60 mg at 12/07/22 1521         Lab Results     TSH   Date Value Ref Range Status   01/05/2021 3.15 0.30 - 5.00 uIU/mL Final     Phosphorus   Date Value Ref Range Status   04/13/2018 3.4 2.5 - 4.5 mg/dL Final           Imaging Results   Last DEXA scan:  No valid procedures specified.    Study Result    Narrative & Impression   EXAM: DX HIP/PELVIS/SPINE  LOCATION: [facility]  DATE/TIME: 4/7/2023 10:09 AM     INDICATION: Osteoporosis without current pathological fracture, unspecified osteoporosis  type.  DEMOGRAPHICS: Age- 62 years. Gender- Female.  COMPARISON: DEXA from 04/05/2021.  TECHNIQUE: Dual-energy x-ray absorptiometry (DXA) performed with routine technique. Trabecular bone score (TBS) analysis performed.     FINDINGS:     DXA RESULTS  -Lumbar Spine: L1-L4: BMD: 0.987 g/cm2. T-score: -1.6. Z-score: -0.3.  -RIGHT Hip Total: BMD: 0.838 g/cm2. T-score: -1.3. Z-score: -0.4.  -RIGHT Hip Femoral neck: BMD: 0.790 g/cm2. T-score: -1.8. Z-score: -0.5.  -LEFT Hip Total: BMD: 0.765 g/cm2. T-score: -1.9. Z-score: -1.0.  -LEFT Hip Femoral neck: BMD: 0.732 g/cm2. T-score: -2.2. Z-score: -0.9.        WHO T-SCORE CRITERIA  -Normal: T score at or above -1 SD  -Osteopenia: T score between -1 and -2.5 SD  -Osteoporosis: T score at or below -2.5 SD     The World Health Organization (WHO) criteria is applicable to perimenopausal females, postmenopausal females, and men aged 50 years or older.     TBS RESULTS  -Lumbar Spine L1-L4: TBS T-score: -1.3.TBS Z-score: 0.5.     The TBS is a DXA derived measurement for fracture risk assessment, and reflects the structural condition of the bone microarchitecture. It can be used to adjust WHO Fracture Risk Assessment Tool (FRAX) probability of fracture in postmenopausal women and   older men. The calculated probabilities of fracture have been shown to be more accurate when computed with the TBS.     INTERVAL CHANGE  -There has been a 2.4% increase in lumbar spine BMD.  -There has also been a 1.7% increase in hip BMD.         FRACTURE RISK  -FRAX Results: The 10 year probability of major osteoporotic fracture is 8.8%, and of hip fracture is 1.5%, based on the left femoral neck BMD.  -TBS-adjusted FRAX Results: The 10 year probability of major osteoporotic fracture is 8.3%, and of hip fracture is 1.2%.     RECOMMENDATIONS  Consider treatment if major osteoporotic fracture score is greater than or equal to 20%, and if the hip fracture score is greater than or equal to 3%.                 "                                                      IMPRESSION: Low bone density (OSTEOPENIA). T score meets the WHO criteria for low bone density (osteopenia) at one or more measured sites. The risk of osteoporotic fracture increases approximately two-fold for each standard deviation decrease in T-score.         Virtual Visit Details    Type of service:  Video Visit     Originating Location (pt. Location): {video visit patient location:954584::\"Home\"}  {PROVIDER LOCATION On-site should be selected for visits conducted from your clinic location or adjoining [facility] hospital, academic office, or other nearby [facility] building. Off-site should be selected for all other provider locations, including home:[address]}  Distant Location (provider location):  {virtual location provider:005642}  Platform used for Video Visit: {Virtual Visit Platforms:664072::\"TwitChat\"}       Answers for HPI/ROS submitted by the patient on 7/10/2023  General Symptoms: Yes  Skin Symptoms: Yes  HENT Symptoms: No  EYE SYMPTOMS: Yes  HEART SYMPTOMS: No  LUNG SYMPTOMS: No  INTESTINAL SYMPTOMS: Yes  URINARY SYMPTOMS: No  GYNECOLOGIC SYMPTOMS: Yes  BREAST SYMPTOMS: No  SKELETAL SYMPTOMS: No  BLOOD SYMPTOMS: No  NERVOUS SYSTEM SYMPTOMS: Yes  MENTAL HEALTH SYMPTOMS: No  Fever: No  Loss of appetite: No  Weight loss: No  Weight gain: No  Fatigue: Yes  Night sweats: Yes  Chills: No  Increased stress: No  Excessive hunger: No  Excessive thirst: No  Feeling hot or cold when others believe the temperature is normal: Yes  Loss of height: No  Post-operative complications: No  Surgical site pain: No  Hallucinations: No  Change in or Loss of Energy: No  Hyperactivity: No  Confusion: No  Changes in hair: Yes  Changes in moles/birth marks: No  Itching: Yes  Rashes: No  Changes in nails: No  Acne: No  Hair in places you don't want it: No  Change in facial hair: No  Warts: No  Non-healing sores: No  Scarring: No  Flaking of skin: No  Color changes of hands/feet in cold : " Yes  Sun sensitivity: No  Skin thickening: No  Eye pain: No  Vision loss: No  Dry eyes: Yes  Watery eyes: No  Eye bulging: No  Double vision: No  Flashing of lights: No  Spots: No  Floaters: No  Redness: No  Crossed eyes: No  Tunnel Vision: No  Yellowing of eyes: No  Eye irritation: Yes  Heart burn or indigestion: Yes  Nausea: No  Vomiting: No  Abdominal pain: Yes  Bloating: Yes  Constipation: Yes  Diarrhea: No  Blood in stool: No  Black stools: No  Rectal or Anal pain: No  Fecal incontinence: No  Yellowing of skin or eyes: No  Vomit with blood: No  Change in stools: No  Bleeding or spotting between periods: No  Heavy or painful periods: No  Irregular periods: No  Vaginal discharge: Yes  Hot flashes: No  Vaginal dryness: No  Genital ulcers: No  Reduced libido: No  Painful intercourse: No  Difficulty with sexual arousal: No  Post-menopausal bleeding: No  Trouble with coordination: No  Dizziness or trouble with balance: No  Fainting or black-out spells: No  Memory loss: No  Headache: No  Seizures: No  Speech problems: No  Tingling: Yes  Tremor: No  Weakness: No  Difficulty walking: No  Paralysis: No  Numbness: Yes          Again, thank you for allowing me to participate in the care of your patient.        Sincerely,        Katelyn Macias NP

## 2023-07-11 NOTE — PROGRESS NOTES
[facility]  ENDOCRINOLOGY    Osteoporosis Follow Up 7/11/2023    Katina Hoffman, 1960, [id number]          Reason for visit      1. Other osteoporosis without current pathological fracture    2. Vitamin D deficiency    3. Weight gain        History     Katina Hoffman is a very pleasant 63 year old old female who presents for follow up.   SUMMARY:     Katina is contacted today in f/u for Osteoporosis. She continues on Prolia injections without difficulties. She reports no falls in the last year. Her last Dexa Scan showed: INTERVAL CHANGE -There has been a 2.4% increase in lumbar spine BMD. -There has also been a 1.7% increase in hip BMD.  Her most recent Calcium level was 9.4 and Vit D level was 21.  She remains active and using her Eliptical.     Vit D level noted in above. She is not currently taking any in supplementation. This is very low normal.    Pt is reporting wide fluctuations in her weight without change in her dietary practice. She is also having temperature regulation problems, predominant being hot flashes.      Risk Factors     The following high- risk conditions have been ruled out: celiac disease, eating disorders, gastric bypass, hyperparathyroidism, inflammatory bowel disease, hyperthyroidism, rheumatoid arthritis, lupus, chronic kidney disease.     Katina Hoffman has the following risk factors: Age, Female gender, , Low BMI, Family history of osteoporosis and Early menopause (<45)     She is not on high risk medications such as glucocorticoids, anti-coagulants, anti-convulsants, chemotherapy.    Patient deniesHysterectomy, Oophrectomy, Breast cancer and Family history of breast cancer.    Menopause was at age: early 40s.     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    Social History      reports that she has never smoked. She has never used smokeless tobacco. She reports current alcohol use of about 2.0 standard drinks of alcohol per week. She reports that she does not use drugs.      Review of Systems     Patient denies current pain, limited mobility, fractures.   Remainder per HPI.    Answers for HPI/ROS submitted by the patient on 7/10/2023  General Symptoms: Yes  Skin Symptoms: Yes  HENT Symptoms: No  EYE SYMPTOMS: Yes  HEART SYMPTOMS: No  LUNG SYMPTOMS: No  INTESTINAL SYMPTOMS: Yes  URINARY SYMPTOMS: No  GYNECOLOGIC SYMPTOMS: Yes  BREAST SYMPTOMS: No  SKELETAL SYMPTOMS: No  BLOOD SYMPTOMS: No  NERVOUS SYSTEM SYMPTOMS: Yes  MENTAL HEALTH SYMPTOMS: No  Fever: No  Loss of appetite: No  Weight loss: No  Weight gain: No  Fatigue: Yes  Night sweats: Yes  Chills: No  Increased stress: No  Excessive hunger: No  Excessive thirst: No  Feeling hot or cold when others believe the temperature is normal: Yes  Loss of height: No  Post-operative complications: No  Surgical site pain: No  Hallucinations: No  Change in or Loss of Energy: No  Hyperactivity: No  Confusion: No  Changes in hair: Yes  Changes in moles/birth marks: No  Itching: Yes  Rashes: No  Changes in nails: No  Acne: No  Hair in places you don't want it: No  Change in facial hair: No  Warts: No  Non-healing sores: No  Scarring: No  Flaking of skin: No  Color changes of hands/feet in cold : Yes  Sun sensitivity: No  Skin thickening: No  Eye pain: No  Vision loss:  No  Dry eyes: Yes  Watery eyes: No  Eye bulging: No  Double vision: No  Flashing of lights: No  Spots: No  Floaters: No  Redness: No  Crossed eyes: No  Tunnel Vision: No  Yellowing of eyes: No  Eye irritation: Yes  Heart burn or indigestion: Yes  Nausea: No  Vomiting: No  Abdominal pain: Yes  Bloating: Yes  Constipation: Yes  Diarrhea: No  Blood in stool: No  Black stools: No  Rectal or Anal pain: No  Fecal incontinence: No  Yellowing of skin or eyes: No  Vomit with blood: No  Change in stools: No  Bleeding or spotting between periods: No  Heavy or painful periods: No  Irregular periods: No  Vaginal discharge: Yes  Hot flashes: No  Vaginal dryness: No  Genital ulcers: No  Reduced libido: No  Painful intercourse: No  Difficulty with sexual arousal: No  Post-menopausal bleeding: No  Trouble with coordination: No  Dizziness or trouble with balance: No  Fainting or black-out spells: No  Memory loss: No  Headache: No  Seizures: No  Speech problems: No  Tingling: Yes  Tremor: No  Weakness: No  Difficulty walking: No  Paralysis: No  Numbness: Yes        Vital Signs     There were no vitals taken for this visit.    Physical Exam     Constitutional:  Well developed, Well nourished  HENT:  Normocephalic,   Neck: normal in appearance  Eyes:  PERRL, Conjunctiva pink  Respiratory:  No respiratory distress  Skin: No acanthosis nigricans, lipoatrophy or lipodystrophy  Neurologic:  Alert & oriented x 3, nonfocal  Psychiatric:  Affect, Mood, Insight appropriate        Assessment     1. Other osteoporosis without current pathological fracture    2. Vitamin D deficiency    3. Weight gain        Plan     Pt will remain on the Prolia injections and f/u with me in 1 year.     Recommend taking 2000 international unit(s) Vit D daily.    Will check her Thyroid labs to see if there is some indication of why she is having weight and temp regulation difficulties.      Katelyn Macias NP   Endocrinology  7/11/2023  10:09 AM      Current  Medications     Outpatient Medications Prior to Visit   Medication Sig Dispense Refill     acyclovir (ZOVIRAX) 200 MG capsule Take 2 capsules (400 mg) by mouth every 12 hours 120 capsule 3     EPINEPHrine (ANY BX GENERIC EQUIV) 0.3 MG/0.3ML injection 2-pack Inject 0.3 mLs (0.3 mg) into the muscle as needed for anaphylaxis May repeat one time in 5-15 minutes if response to initial dose is inadequate. 2 each 1     hydrocortisone (PROCTOSOL HC) 2.5 % rectal cream [HYDROCORTISONE (PROCTOSOL HC) 2.5 % RECTAL CREAM] Apply to the affected area twice daily as needed. 28.35 g 0     ketorolac (TORADOL) 10 MG tablet Take 1 tablet (10 mg) by mouth every 6 hours as needed for moderate pain 30 tablet 1     naproxen-diphenhydramine (ALEVE PM) 220-25 mg Tab [NAPROXEN-DIPHENHYDRAMINE (ALEVE PM) 220-25 MG TAB] Take 1 tablet by mouth daily as needed.       ondansetron (ZOFRAN-ODT) 4 MG disintegrating tablet [ONDANSETRON (ZOFRAN-ODT) 4 MG DISINTEGRATING TABLET] DISSOLVE 1 TABLET(4 MG) ON THE TONGUE EVERY 8 HOURS AS NEEDED FOR NAUSEA 12 tablet 1     SUMAtriptan (IMITREX) 50 MG tablet [SUMATRIPTAN (IMITREX) 50 MG TABLET]        Facility-Administered Medications Prior to Visit   Medication Dose Route Frequency Provider Last Rate Last Admin     denosumab (PROLIA) injection 60 mg  60 mg Subcutaneous Q6 Months Katelyn Macias NP   60 mg at 06/07/23 1529     denosumab (PROLIA) injection 60 mg  60 mg Subcutaneous Q6 Months Adina Vera PharmD   60 mg at 12/07/22 1521         Lab Results     TSH   Date Value Ref Range Status   01/05/2021 3.15 0.30 - 5.00 uIU/mL Final     Phosphorus   Date Value Ref Range Status   04/13/2018 3.4 2.5 - 4.5 mg/dL Final           Imaging Results   Last DEXA scan:  No valid procedures specified.    Study Result    Narrative & Impression   EXAM: DX HIP/PELVIS/SPINE  LOCATION: [facility]  DATE/TIME: 4/7/2023 10:09 AM     INDICATION: Osteoporosis without current pathological fracture,  unspecified osteoporosis type.  DEMOGRAPHICS: Age- 62 years. Gender- Female.  COMPARISON: DEXA from 04/05/2021.  TECHNIQUE: Dual-energy x-ray absorptiometry (DXA) performed with routine technique. Trabecular bone score (TBS) analysis performed.     FINDINGS:     DXA RESULTS  -Lumbar Spine: L1-L4: BMD: 0.987 g/cm2. T-score: -1.6. Z-score: -0.3.  -RIGHT Hip Total: BMD: 0.838 g/cm2. T-score: -1.3. Z-score: -0.4.  -RIGHT Hip Femoral neck: BMD: 0.790 g/cm2. T-score: -1.8. Z-score: -0.5.  -LEFT Hip Total: BMD: 0.765 g/cm2. T-score: -1.9. Z-score: -1.0.  -LEFT Hip Femoral neck: BMD: 0.732 g/cm2. T-score: -2.2. Z-score: -0.9.        WHO T-SCORE CRITERIA  -Normal: T score at or above -1 SD  -Osteopenia: T score between -1 and -2.5 SD  -Osteoporosis: T score at or below -2.5 SD     The World Health Organization (WHO) criteria is applicable to perimenopausal females, postmenopausal females, and men aged 50 years or older.     TBS RESULTS  -Lumbar Spine L1-L4: TBS T-score: -1.3.TBS Z-score: 0.5.     The TBS is a DXA derived measurement for fracture risk assessment, and reflects the structural condition of the bone microarchitecture. It can be used to adjust WHO Fracture Risk Assessment Tool (FRAX) probability of fracture in postmenopausal women and   older men. The calculated probabilities of fracture have been shown to be more accurate when computed with the TBS.     INTERVAL CHANGE  -There has been a 2.4% increase in lumbar spine BMD.  -There has also been a 1.7% increase in hip BMD.         FRACTURE RISK  -FRAX Results: The 10 year probability of major osteoporotic fracture is 8.8%, and of hip fracture is 1.5%, based on the left femoral neck BMD.  -TBS-adjusted FRAX Results: The 10 year probability of major osteoporotic fracture is 8.3%, and of hip fracture is 1.2%.     RECOMMENDATIONS  Consider treatment if major osteoporotic fracture score is greater than or equal to 20%, and if the hip fracture score is greater than or  equal to 3%.                                                                      IMPRESSION: Low bone density (OSTEOPENIA). T score meets the WHO criteria for low bone density (osteopenia) at one or more measured sites. The risk of osteoporotic fracture increases approximately two-fold for each standard deviation decrease in T-score.         Virtual Visit Details    Type of service:  Video Visit     Originating Location (pt. Location): Home    Distant Location (provider location):  On-site  Platform used for Video Visit: PeymanWell

## 2023-08-01 ENCOUNTER — LAB (OUTPATIENT)
Dept: LAB | Facility: HOSPITAL | Age: 63
End: 2023-08-01
Payer: COMMERCIAL

## 2023-08-01 DIAGNOSIS — R63.5 WEIGHT GAIN: ICD-10-CM

## 2023-08-01 DIAGNOSIS — M81.8 OTHER OSTEOPOROSIS WITHOUT CURRENT PATHOLOGICAL FRACTURE: ICD-10-CM

## 2023-08-01 LAB
CALCIUM SERPL-MCNC: 9.9 MG/DL (ref 8.8–10.2)
CREAT SERPL-MCNC: 0.69 MG/DL (ref 0.51–0.95)
DEPRECATED CALCIDIOL+CALCIFEROL SERPL-MC: 29 UG/L (ref 20–75)
GFR SERPL CREATININE-BSD FRML MDRD: >90 ML/MIN/1.73M2
T4 FREE SERPL-MCNC: 1.09 NG/DL (ref 0.9–1.7)
TSH SERPL DL<=0.005 MIU/L-ACNC: 4.73 UIU/ML (ref 0.3–4.2)

## 2023-08-01 PROCEDURE — 82565 ASSAY OF CREATININE: CPT

## 2023-08-01 PROCEDURE — 84439 ASSAY OF FREE THYROXINE: CPT

## 2023-08-01 PROCEDURE — 82310 ASSAY OF CALCIUM: CPT

## 2023-08-01 PROCEDURE — 84443 ASSAY THYROID STIM HORMONE: CPT

## 2023-08-01 PROCEDURE — 82306 VITAMIN D 25 HYDROXY: CPT

## 2023-08-01 PROCEDURE — 36415 COLL VENOUS BLD VENIPUNCTURE: CPT

## 2023-11-22 ENCOUNTER — TELEPHONE (OUTPATIENT)
Dept: ENDOCRINOLOGY | Facility: CLINIC | Age: 63
End: 2023-11-22
Payer: COMMERCIAL

## 2023-11-28 ENCOUNTER — LAB (OUTPATIENT)
Dept: LAB | Facility: HOSPITAL | Age: 63
End: 2023-11-28
Payer: COMMERCIAL

## 2023-11-28 DIAGNOSIS — M81.0 OSTEOPOROSIS: ICD-10-CM

## 2023-11-28 LAB
CALCIUM SERPL-MCNC: 9.5 MG/DL (ref 8.8–10.2)
VIT D+METAB SERPL-MCNC: 27 NG/ML (ref 20–50)

## 2023-11-28 PROCEDURE — 82310 ASSAY OF CALCIUM: CPT

## 2023-11-28 PROCEDURE — 82306 VITAMIN D 25 HYDROXY: CPT

## 2023-11-28 PROCEDURE — 36415 COLL VENOUS BLD VENIPUNCTURE: CPT

## 2023-12-06 ENCOUNTER — TELEPHONE (OUTPATIENT)
Dept: ENDOCRINOLOGY | Facility: CLINIC | Age: 63
End: 2023-12-06
Payer: COMMERCIAL

## 2023-12-06 ASSESSMENT — ENCOUNTER SYMPTOMS: HEARTBURN: 1

## 2023-12-06 NOTE — TELEPHONE ENCOUNTER
[facility] Call Center    Phone Message    May a detailed message be left on voicemail: yes     Reason for Call: Other: Patient is scheduled for a Prolia injection on 12/13.  However, she is also having a root canal that date.  Patient would like to know if she should reschedule her Prolia.  Please return call.     Action Taken: Other: endo    Travel Screening: Not Applicable

## 2023-12-06 NOTE — TELEPHONE ENCOUNTER
Spoke to pt and informed that as long as they do no do an extraction or implant pt can still have injection.

## 2023-12-11 ENCOUNTER — LAB REQUISITION (OUTPATIENT)
Dept: LAB | Facility: CLINIC | Age: 63
End: 2023-12-11
Payer: COMMERCIAL

## 2023-12-11 DIAGNOSIS — Z12.4 ENCOUNTER FOR SCREENING FOR MALIGNANT NEOPLASM OF CERVIX: ICD-10-CM

## 2023-12-11 DIAGNOSIS — Z11.3 ENCOUNTER FOR SCREENING FOR INFECTIONS WITH A PREDOMINANTLY SEXUAL MODE OF TRANSMISSION: ICD-10-CM

## 2023-12-11 PROCEDURE — 87624 HPV HI-RISK TYP POOLED RSLT: CPT | Mod: ORL | Performed by: OBSTETRICS & GYNECOLOGY

## 2023-12-11 PROCEDURE — G0145 SCR C/V CYTO,THINLAYER,RESCR: HCPCS | Mod: ORL | Performed by: OBSTETRICS & GYNECOLOGY

## 2023-12-11 PROCEDURE — 87491 CHLMYD TRACH DNA AMP PROBE: CPT | Mod: ORL | Performed by: OBSTETRICS & GYNECOLOGY

## 2023-12-11 PROCEDURE — 86803 HEPATITIS C AB TEST: CPT | Mod: ORL | Performed by: OBSTETRICS & GYNECOLOGY

## 2023-12-11 PROCEDURE — 87340 HEPATITIS B SURFACE AG IA: CPT | Mod: ORL | Performed by: OBSTETRICS & GYNECOLOGY

## 2023-12-11 PROCEDURE — 87591 N.GONORRHOEAE DNA AMP PROB: CPT | Mod: ORL | Performed by: OBSTETRICS & GYNECOLOGY

## 2023-12-11 PROCEDURE — 87389 HIV-1 AG W/HIV-1&-2 AB AG IA: CPT | Mod: ORL | Performed by: OBSTETRICS & GYNECOLOGY

## 2023-12-11 PROCEDURE — 86780 TREPONEMA PALLIDUM: CPT | Mod: ORL | Performed by: OBSTETRICS & GYNECOLOGY

## 2023-12-12 LAB
C TRACH DNA SPEC QL PROBE+SIG AMP: NEGATIVE
HBV SURFACE AG SERPL QL IA: NONREACTIVE
HCV AB SERPL QL IA: NONREACTIVE
HCV AB SERPL QL IA: NONREACTIVE
HIV 1+2 AB+HIV1 P24 AG SERPL QL IA: NONREACTIVE
N GONORRHOEA DNA SPEC QL NAA+PROBE: NEGATIVE
T PALLIDUM AB SER QL: NONREACTIVE

## 2023-12-13 ENCOUNTER — ALLIED HEALTH/NURSE VISIT (OUTPATIENT)
Dept: ENDOCRINOLOGY | Facility: CLINIC | Age: 63
End: 2023-12-13
Payer: COMMERCIAL

## 2023-12-13 DIAGNOSIS — M81.8 OTHER OSTEOPOROSIS WITHOUT CURRENT PATHOLOGICAL FRACTURE: Primary | ICD-10-CM

## 2023-12-13 PROCEDURE — 96372 THER/PROPH/DIAG INJ SC/IM: CPT | Performed by: NURSE PRACTITIONER

## 2023-12-13 PROCEDURE — 99207 PR NO CHARGE NURSE ONLY: CPT

## 2023-12-13 NOTE — PROGRESS NOTES
"Prolia Injection Phone Screen      Screening questions have been asked 2-3 days prior to administration visit for Prolia. If any questions are answered with \"Yes,\" this phone encounter were will routed to ordering provider for further evaluation.     1.  When was the last injection?  6/7/23    2.  Has insurance for this injection been verified?  Yes    3.  Did you experience any new onset achiness or rashes that lasted for over a month with your previous Prolia injection?   No    4.  Do you have a fever over 101?F or a new deep cough that is unusual for you today? No    5.  Have you started any new medications in the last 6 months that you were told could affect your immune system? These may have been prescribed by oncologist, transplant, rheumatology, or dermatology.   No    6.  In the last 6 months have you have gastric bypass or parathyroid surgery?   No    7.  Do you plan dental work requiring drilling into the bone such as implants/extractions or oral surgery in the next 2-3 months?   No    8. Do you have new insurance since the last injection?    Patient informed if symptoms discussed above present prior to their administration appointment, they are to notify clinic immediately.     Jailene Pablo RN          The following steps were completed to comply with the REMS program for Prolia:  1. Ordering provider has previously reviewed information in the Medication Guide and Patient Counseling Chart, including the serious risks of Prolia  and the symptoms of each risk and have been advised to seek prompt medical attention if they have signs or symptoms of any of the serious risks.  2. Provided each patient a copy of the Medication Guide and Patient Brochure.  See MAR for administration details.   Indication: Prolia  (denosumab) is a prescription medicine used to treat osteoporosis in patients who:   Are at high risk for fracture, meaning patients who have had a fracture related to osteoporosis, or who have " multiple risk factors for fracture; Cannot use another osteoporosis medicine or other osteoporosis medicines did not work well.   The timeline for early/late injections would be 4 weeks early and any time after the 6 month sadiq. If a patient receives their injection late, then the subsequent injection would be 6 months from the date that they actually received the injection    Have the screening questions been asked prior to this administration? Yes    Clinic Administered Medication Documentation      Prolia Documentation    Indication: Prolia  (denosumab) is a prescription medicine used to treat osteoporosis in patients who:   Are at high risk for fracture, meaning patients who have had a fracture related to osteoporosis, or who have multiple risk factors for fracture.  Cannot use another osteoporosis medicine or other osteoporosis medicines did not work well.  The timeline for early/late injections would be 4 weeks early and any time after the 6 month sadiq. If a patient receives their injection late, then the subsequent injection would be 6 months from the date that they actually received the injection.    When was the last injection?  23  Was the last injection at least 6 months ago? Yes  Has the prior authorization been completed?  Yes  Is there an active order (written within the past 365 days, with administrations remaining, not ) in the chart?  Yes  Patient denies any dental work involving the bone (e.g. tooth extraction or dental implants) in the past 4 weeks?  Yes  Patient denies plans for any dental work involving the bone (e.g. tooth extraction or dental implants) in the next 4 weeks? Yes    The following steps were completed to comply with the REMS program for Prolia:  Reviewed information in the Medication Guide and Patient Counseling Chart, including the serious risks of Prolia  and the symptoms of each risk.  Advised patient to seek prompt medical attention if they have signs or symptoms of  any of the serious risks.  Provided each patient a copy of the Medication Guide and Patient Brochure.    Prior to injection, verified patient identity using patient's name and date of birth. Medication was administered. Please see MAR and medication order for additional information. Patient instructed to remain in clinic for 15 minutes and report any adverse reaction to staff immediately.    Vial/Syringe: Syringe  Was this medication supplied by the patient? No  Verified that the patient has refills remaining in their prescription.

## 2023-12-14 LAB
BKR LAB AP GYN ADEQUACY: NORMAL
BKR LAB AP GYN INTERPRETATION: NORMAL
BKR LAB AP HPV REFLEX: NORMAL
BKR LAB AP LMP: NORMAL
BKR LAB AP PREVIOUS ABNL DX: NORMAL
BKR LAB AP PREVIOUS ABNORMAL: NORMAL
PATH REPORT.COMMENTS IMP SPEC: NORMAL
PATH REPORT.COMMENTS IMP SPEC: NORMAL
PATH REPORT.RELEVANT HX SPEC: NORMAL

## 2024-02-05 ENCOUNTER — ANCILLARY PROCEDURE (OUTPATIENT)
Dept: MAMMOGRAPHY | Facility: CLINIC | Age: 64
End: 2024-02-05
Attending: FAMILY MEDICINE
Payer: COMMERCIAL

## 2024-02-05 DIAGNOSIS — Z12.31 SCREENING MAMMOGRAM, ENCOUNTER FOR: ICD-10-CM

## 2024-02-05 PROCEDURE — 77063 BREAST TOMOSYNTHESIS BI: CPT

## 2024-05-28 ENCOUNTER — TELEPHONE (OUTPATIENT)
Dept: FAMILY MEDICINE | Facility: CLINIC | Age: 64
End: 2024-05-28
Payer: COMMERCIAL

## 2024-06-05 ENCOUNTER — TELEPHONE (OUTPATIENT)
Dept: ENDOCRINOLOGY | Facility: CLINIC | Age: 64
End: 2024-06-05

## 2024-06-05 DIAGNOSIS — M81.8 OTHER OSTEOPOROSIS WITHOUT CURRENT PATHOLOGICAL FRACTURE: Primary | ICD-10-CM

## 2024-06-05 DIAGNOSIS — Z92.29 PERSONAL HISTORY OF DRUG THERAPY: ICD-10-CM

## 2024-06-05 NOTE — TELEPHONE ENCOUNTER
6/5- tcb x1   Olanzapine Counseling- I discussed with the patient the common side effects of olanzapine including but are not limited to: lack of energy, dry mouth, increased appetite, sleepiness, tremor, constipation, dizziness, changes in behavior, or restlessness.  Explained that teenagers are more likely to experience headaches, abdominal pain, pain in the arms or legs, tiredness, and sleepiness.  Serious side effects include but are not limited: increased risk of death in elderly patients who are confused, have memory loss, or dementia-related psychosis; hyperglycemia; increased cholesterol and triglycerides; and weight gain.

## 2024-06-24 ENCOUNTER — LAB (OUTPATIENT)
Dept: LAB | Facility: HOSPITAL | Age: 64
End: 2024-06-24
Payer: COMMERCIAL

## 2024-06-24 DIAGNOSIS — M81.8 OTHER OSTEOPOROSIS WITHOUT CURRENT PATHOLOGICAL FRACTURE: ICD-10-CM

## 2024-06-24 DIAGNOSIS — Z92.29 PERSONAL HISTORY OF DRUG THERAPY: ICD-10-CM

## 2024-06-24 LAB — CALCIUM SERPL-MCNC: 9.8 MG/DL (ref 8.8–10.2)

## 2024-06-24 PROCEDURE — 36415 COLL VENOUS BLD VENIPUNCTURE: CPT

## 2024-06-24 PROCEDURE — 82310 ASSAY OF CALCIUM: CPT

## 2024-06-26 ENCOUNTER — ALLIED HEALTH/NURSE VISIT (OUTPATIENT)
Dept: ENDOCRINOLOGY | Facility: CLINIC | Age: 64
End: 2024-06-26
Payer: COMMERCIAL

## 2024-06-26 DIAGNOSIS — M81.8 OTHER OSTEOPOROSIS WITHOUT CURRENT PATHOLOGICAL FRACTURE: Primary | ICD-10-CM

## 2024-06-26 PROCEDURE — 99207 PR NO CHARGE NURSE ONLY: CPT

## 2024-06-26 PROCEDURE — 96372 THER/PROPH/DIAG INJ SC/IM: CPT | Performed by: NURSE PRACTITIONER

## 2024-06-26 PROCEDURE — 99207 PR NO CHARGE NURSE ONLY: CPT | Performed by: NURSE PRACTITIONER

## 2024-06-26 NOTE — PROGRESS NOTES
Clinic Administered Medication Documentation      Prolia Documentation    Indication: Prolia  (denosumab) is a prescription medicine used to treat osteoporosis in patients who:   Are at high risk for fracture, meaning patients who have had a fracture related to osteoporosis, or who have multiple risk factors for fracture.  Cannot use another osteoporosis medicine or other osteoporosis medicines did not work well.  The timeline for early/late injections would be 4 weeks early and any time after the 6 month sadiq. If a patient receives their injection late, then the subsequent injection would be 6 months from the date that they actually received the injection.    When was the last injection?  2023  Was the last injection at least 6 months ago? Yes  Has the prior authorization been completed?  Yes  Is there an active order (written within the past 365 days, with administrations remaining, not ) in the chart?  Yes   GFR Estimate   Date Value Ref Range Status   2023 >90 >60 mL/min/1.73m2 Final   2021 >60 >60 mL/min/1.73m2 Final     Has patient had a GFR within the last 12 months? Yes   Is GFR under 30, or patient has a diagnosis of CKD4 or CKD5? No   Patient denies gastric bypass or parathyroid surgery in past 6 months? Yes - patient denies.   Patient denies dental work in the past two months involving drilling into the bone, such as implants/extractions, oral surgery or a tooth extraction that has not healed yet?  Yes  Patient denies plans for an emergency tooth extraction within the next week? Yes    The following steps were completed to comply with the REMS program for Prolia:  Reviewed information in the Medication Guide, including the serious risks of Prolia  and the symptoms of each risk.  Advised patient to seek prompt medical attention if they have signs or symptoms of any of the serious risks.  Provided each patient a copy of the Medication Guide and Patient Guide.    Prior to injection,  verified patient identity using patient's name and date of birth. Medication was administered. Please see MAR and medication order for additional information. Patient instructed to remain in clinic for 15 minutes and report any adverse reaction to staff immediately.    Vial/Syringe: Syringe  Was this medication supplied by the patient? No  Verified that the patient has refills remaining in their prescription.

## 2024-07-16 ENCOUNTER — VIRTUAL VISIT (OUTPATIENT)
Dept: ENDOCRINOLOGY | Facility: CLINIC | Age: 64
End: 2024-07-16
Payer: COMMERCIAL

## 2024-07-16 DIAGNOSIS — M81.8 OTHER OSTEOPOROSIS WITHOUT CURRENT PATHOLOGICAL FRACTURE: Primary | ICD-10-CM

## 2024-07-16 PROBLEM — E03.9 HYPOTHYROID: Status: RESOLVED | Noted: 2018-04-13 | Resolved: 2024-07-16

## 2024-07-16 PROBLEM — H53.40 VISUAL FIELD CUT: Status: RESOLVED | Noted: 2017-04-05 | Resolved: 2024-07-16

## 2024-07-16 PROCEDURE — 99214 OFFICE O/P EST MOD 30 MIN: CPT | Mod: 95 | Performed by: NURSE PRACTITIONER

## 2024-07-16 NOTE — Clinical Note
"7/16/2024      Katina Hoffman  [address] [place] [id number]      Dear Colleague,    Thank you for referring your patient, Katina Hoffman, to the [facility]. Please see a copy of my visit note below.                                                                Virtual Visit Details    Type of service:  Video Visit     Originating Location (pt. Location): {video visit patient location:133385::\"Home\"}  {PROVIDER LOCATION On-site should be selected for visits conducted from your clinic location or adjoining [facility] hospital, academic office, or other nearby [facility] building. Off-site should be selected for all other provider locations, including home:[id number]}  Distant Location (provider location):  {virtual location provider:[id number]}  Platform used for Video Visit: {Virtual Visit Platforms:[id number]::\"FanKave\"}        Again, thank you for allowing me to participate in the care of your patient.        Sincerely,        Katelyn Macias NP  "

## 2024-07-16 NOTE — PROGRESS NOTES
[facility]  ENDOCRINOLOGY    Osteoporosis Follow Up 7/16/2024    Katina Hoffman, 1960, [id number]          Reason for visit      1. Other osteoporosis without current pathological fracture        History     Katina Hoffman is a very pleasant 64 year old old female who presents for follow up.   SUMMARY:    Adilene is seen today in follow-up for Osteoporosis. She continues on Prolia injections without difficulties. She has had no falls in the last year. Her most recent A1c was INTERVAL CHANGE-There has been a 2.4% increase in lumbar spine BMD. -There has also been a 1.7% increase in hip BMD. Her current Vit D level is 27 and low normal. Calcium level is 9.8 and good.  She remains quite active, Biking, doing Elliptical, and machine weights.       Risk Factors     The following high- risk conditions have been ruled out: celiac disease, eating disorders, gastric bypass, hyperparathyroidism, inflammatory bowel disease, hyperthyroidism, rheumatoid arthritis, lupus, chronic kidney disease.     Katina Hoffman has the following risk factors: Age, Female gender, , Low BMI, Family history of osteoporosis and Early menopause (<45)     She is not on high risk medications such as glucocorticoids, anti-coagulants, anti-convulsants, chemotherapy.    Patient deniesHysterectomy, Oophrectomy, Breast cancer and Family history of breast cancer.    Menopause was at age: early 40s.     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    Social History      reports that she has never smoked. She has never used smokeless tobacco. She reports current alcohol use of about 2.0 standard drinks of alcohol per week. She reports that she does not use drugs.      Review of Systems     Patient denies current pain, limited mobility, fractures.   Remainder per HPI.      Vital Signs     There were no vitals taken for this visit.    Physical Exam     Constitutional:  Well developed, Well nourished  HENT:  Normocephalic,   Neck: normal in appearance  Eyes:  PERRL, Conjunctiva pink  Respiratory:  No respiratory distress  Skin: No acanthosis nigricans, lipoatrophy or lipodystrophy  Neurologic:  Alert & oriented x 3, nonfocal  Psychiatric:  Affect, Mood, Insight appropriate      Assessment     1. Other osteoporosis without current pathological fracture        Plan     Adilene will continue on the Prolia injections. I recommended that she start taking a Vit D supplement of 2000 international unit(s) or 50 mcg daily. Follow-up with me in 1 year.         Katelyn Macias NP   Endocrinology  7/16/2024  10:08 AM    Current Medications     Outpatient Medications Prior to Visit   Medication Sig Dispense Refill    acyclovir (ZOVIRAX) 200 MG capsule Take 2 capsules (400 mg) by mouth every 12 hours 120 capsule 3    EPINEPHrine (ANY BX GENERIC EQUIV) 0.3 MG/0.3ML injection 2-pack Inject 0.3 mLs (0.3 mg) into the muscle as needed for anaphylaxis May repeat one time in 5-15 minutes if response to initial dose is inadequate. 2 each 1    hydrocortisone (PROCTOSOL HC) 2.5 % rectal cream [HYDROCORTISONE (PROCTOSOL HC) 2.5 % RECTAL CREAM] Apply to the affected area twice daily as needed. 28.35 g 0    ketorolac (TORADOL) 10 MG tablet Take 1 tablet (10 mg) by mouth every 6 hours as needed for moderate pain 30 tablet 1    naproxen-diphenhydramine (ALEVE PM)  220-25 mg Tab [NAPROXEN-DIPHENHYDRAMINE (ALEVE PM) 220-25 MG TAB] Take 1 tablet by mouth daily as needed.      ondansetron (ZOFRAN-ODT) 4 MG disintegrating tablet [ONDANSETRON (ZOFRAN-ODT) 4 MG DISINTEGRATING TABLET] DISSOLVE 1 TABLET(4 MG) ON THE TONGUE EVERY 8 HOURS AS NEEDED FOR NAUSEA 12 tablet 1    SUMAtriptan (IMITREX) 50 MG tablet [SUMATRIPTAN (IMITREX) 50 MG TABLET]        Facility-Administered Medications Prior to Visit   Medication Dose Route Frequency Provider Last Rate Last Admin    denosumab (PROLIA) injection 60 mg  60 mg Subcutaneous Q6 Months Kaehr, Katelyn, NP   60 mg at 06/26/24 1545    denosumab (PROLIA) injection 60 mg  60 mg Subcutaneous Q6 Months Colleen Katelyn, NP   60 mg at 12/13/23 1538         Lab Results     TSH   Date Value Ref Range Status   08/01/2023 4.73 (H) 0.30 - 4.20 uIU/mL Final   01/05/2021 3.15 0.30 - 5.00 uIU/mL Final     Phosphorus   Date Value Ref Range Status   04/13/2018 3.4 2.5 - 4.5 mg/dL Final           Imaging Results   Last DEXA scan:  No valid procedures specified.      Study Result    Narrative & Impression   EXAM: DX HIP/PELVIS/SPINE  LOCATION: [facility]  DATE/TIME: 4/7/2023 10:09 AM     INDICATION: Osteoporosis without current pathological fracture, unspecified osteoporosis type.  DEMOGRAPHICS: Age- 62 years. Gender- Female.  COMPARISON: DEXA from 04/05/2021.  TECHNIQUE: Dual-energy x-ray absorptiometry (DXA) performed with routine technique. Trabecular bone score (TBS) analysis performed.     FINDINGS:     DXA RESULTS  -Lumbar Spine: L1-L4: BMD: 0.987 g/cm2. T-score: -1.6. Z-score: -0.3.  -RIGHT Hip Total: BMD: 0.838 g/cm2. T-score: -1.3. Z-score: -0.4.  -RIGHT Hip Femoral neck: BMD: 0.790 g/cm2. T-score: -1.8. Z-score: -0.5.  -LEFT Hip Total: BMD: 0.765 g/cm2. T-score: -1.9. Z-score: -1.0.  -LEFT Hip Femoral neck: BMD: 0.732 g/cm2. T-score: -2.2. Z-score: -0.9.        WHO T-SCORE CRITERIA  -Normal: T score at or above -1 SD  -Osteopenia:  T score between -1 and -2.5 SD  -Osteoporosis: T score at or below -2.5 SD     The World Health Organization (WHO) criteria is applicable to perimenopausal females, postmenopausal females, and men aged 50 years or older.     TBS RESULTS  -Lumbar Spine L1-L4: TBS T-score: -1.3.TBS Z-score: 0.5.     The TBS is a DXA derived measurement for fracture risk assessment, and reflects the structural condition of the bone microarchitecture. It can be used to adjust WHO Fracture Risk Assessment Tool (FRAX) probability of fracture in postmenopausal women and   older men. The calculated probabilities of fracture have been shown to be more accurate when computed with the TBS.     INTERVAL CHANGE  -There has been a 2.4% increase in lumbar spine BMD.  -There has also been a 1.7% increase in hip BMD.         FRACTURE RISK  -FRAX Results: The 10 year probability of major osteoporotic fracture is 8.8%, and of hip fracture is 1.5%, based on the left femoral neck BMD.  -TBS-adjusted FRAX Results: The 10 year probability of major osteoporotic fracture is 8.3%, and of hip fracture is 1.2%.     RECOMMENDATIONS  Consider treatment if major osteoporotic fracture score is greater than or equal to 20%, and if the hip fracture score is greater than or equal to 3%.                                                                      IMPRESSION: Low bone density (OSTEOPENIA). T score meets the WHO criteria for low bone density (osteopenia) at one or more measured sites. The risk of osteoporotic fracture increases approximately two-fold for each standard deviation decrease in T-score.            Visit Start Time:1000  Visit Stop Time: 1020        Virtual Visit Details    Type of service:  Video Visit     Originating Location (pt. Location): Home    Distant Location (provider location):  On-site  Platform used for Video Visit: [email or web address]

## 2024-08-07 ENCOUNTER — OFFICE VISIT (OUTPATIENT)
Dept: FAMILY MEDICINE | Facility: CLINIC | Age: 64
End: 2024-08-07
Payer: COMMERCIAL

## 2024-08-07 ENCOUNTER — APPOINTMENT (OUTPATIENT)
Dept: RADIOLOGY | Facility: HOSPITAL | Age: 64
End: 2024-08-07
Attending: EMERGENCY MEDICINE
Payer: COMMERCIAL

## 2024-08-07 ENCOUNTER — HOSPITAL ENCOUNTER (EMERGENCY)
Facility: HOSPITAL | Age: 64
Discharge: HOME | End: 2024-08-07
Attending: EMERGENCY MEDICINE | Admitting: EMERGENCY MEDICINE
Payer: COMMERCIAL

## 2024-08-07 VITALS
OXYGEN SATURATION: 100 % | RESPIRATION RATE: 18 BRPM | SYSTOLIC BLOOD PRESSURE: 144 MMHG | DIASTOLIC BLOOD PRESSURE: 82 MMHG | BODY MASS INDEX: 18.19 KG/M2 | HEART RATE: 44 BPM | TEMPERATURE: 98.9 F | WEIGHT: 99.44 LBS

## 2024-08-07 VITALS
RESPIRATION RATE: 14 BRPM | HEIGHT: 61 IN | BODY MASS INDEX: 18.5 KG/M2 | HEART RATE: 51 BPM | WEIGHT: 98 LBS | DIASTOLIC BLOOD PRESSURE: 75 MMHG | OXYGEN SATURATION: 95 % | TEMPERATURE: 98 F | SYSTOLIC BLOOD PRESSURE: 123 MMHG

## 2024-08-07 DIAGNOSIS — R42 DIZZINESS: Primary | ICD-10-CM

## 2024-08-07 DIAGNOSIS — R00.1 SINUS BRADYCARDIA: ICD-10-CM

## 2024-08-07 DIAGNOSIS — R03.0 ELEVATED BLOOD PRESSURE READING WITHOUT DIAGNOSIS OF HYPERTENSION: ICD-10-CM

## 2024-08-07 LAB
ALBUMIN SERPL BCG-MCNC: 4.1 G/DL (ref 3.5–5.2)
ALP SERPL-CCNC: 66 U/L (ref 40–150)
ALT SERPL W P-5'-P-CCNC: 15 U/L (ref 0–50)
ANION GAP SERPL CALCULATED.3IONS-SCNC: 9 MMOL/L (ref 7–15)
AST SERPL W P-5'-P-CCNC: 24 U/L (ref 0–45)
BASOPHILS # BLD AUTO: 0 10E3/UL (ref 0–0.2)
BASOPHILS NFR BLD AUTO: 1 %
BILIRUB DIRECT SERPL-MCNC: <0.2 MG/DL (ref 0–0.3)
BILIRUB SERPL-MCNC: 0.6 MG/DL
BUN SERPL-MCNC: 14.9 MG/DL (ref 8–23)
CALCIUM SERPL-MCNC: 9 MG/DL (ref 8.8–10.4)
CHLORIDE SERPL-SCNC: 103 MMOL/L (ref 98–107)
CREAT SERPL-MCNC: 0.74 MG/DL (ref 0.51–0.95)
EGFRCR SERPLBLD CKD-EPI 2021: >90 ML/MIN/1.73M2
EOSINOPHIL # BLD AUTO: 0.2 10E3/UL (ref 0–0.7)
EOSINOPHIL NFR BLD AUTO: 5 %
ERYTHROCYTE [DISTWIDTH] IN BLOOD BY AUTOMATED COUNT: 13.2 % (ref 10–15)
GLUCOSE SERPL-MCNC: 88 MG/DL (ref 70–99)
HCO3 SERPL-SCNC: 28 MMOL/L (ref 22–29)
HCT VFR BLD AUTO: 39.5 % (ref 35–47)
HGB BLD-MCNC: 12.8 G/DL (ref 11.7–15.7)
HOLD SPECIMEN: NORMAL
HOLD SPECIMEN: NORMAL
IMM GRANULOCYTES # BLD: 0 10E3/UL
IMM GRANULOCYTES NFR BLD: 0 %
LIPASE SERPL-CCNC: 134 U/L (ref 13–60)
LYMPHOCYTES # BLD AUTO: 1.4 10E3/UL (ref 0.8–5.3)
LYMPHOCYTES NFR BLD AUTO: 31 %
MCH RBC QN AUTO: 30.8 PG (ref 26.5–33)
MCHC RBC AUTO-ENTMCNC: 32.4 G/DL (ref 31.5–36.5)
MCV RBC AUTO: 95 FL (ref 78–100)
MONOCYTES # BLD AUTO: 0.4 10E3/UL (ref 0–1.3)
MONOCYTES NFR BLD AUTO: 9 %
NEUTROPHILS # BLD AUTO: 2.4 10E3/UL (ref 1.6–8.3)
NEUTROPHILS NFR BLD AUTO: 55 %
NRBC # BLD AUTO: 0 10E3/UL
NRBC BLD AUTO-RTO: 0 /100
PLATELET # BLD AUTO: 217 10E3/UL (ref 150–450)
POTASSIUM SERPL-SCNC: 4.2 MMOL/L (ref 3.4–5.3)
PROT SERPL-MCNC: 6.5 G/DL (ref 6.4–8.3)
RBC # BLD AUTO: 4.16 10E6/UL (ref 3.8–5.2)
SODIUM SERPL-SCNC: 140 MMOL/L (ref 135–145)
T4 FREE SERPL-MCNC: 0.94 NG/DL (ref 0.9–1.7)
TROPONIN T SERPL HS-MCNC: 7 NG/L
TROPONIN T SERPL HS-MCNC: 7 NG/L
TSH SERPL DL<=0.005 MIU/L-ACNC: 5.54 UIU/ML (ref 0.3–4.2)
WBC # BLD AUTO: 4.4 10E3/UL (ref 4–11)

## 2024-08-07 PROCEDURE — 99214 OFFICE O/P EST MOD 30 MIN: CPT | Performed by: PHYSICIAN ASSISTANT

## 2024-08-07 PROCEDURE — 80053 COMPREHEN METABOLIC PANEL: CPT | Performed by: EMERGENCY MEDICINE

## 2024-08-07 PROCEDURE — 85025 COMPLETE CBC W/AUTO DIFF WBC: CPT | Performed by: EMERGENCY MEDICINE

## 2024-08-07 PROCEDURE — 36415 COLL VENOUS BLD VENIPUNCTURE: CPT | Performed by: EMERGENCY MEDICINE

## 2024-08-07 PROCEDURE — 93005 ELECTROCARDIOGRAM TRACING: CPT

## 2024-08-07 PROCEDURE — 93005 ELECTROCARDIOGRAM TRACING: CPT | Performed by: EMERGENCY MEDICINE

## 2024-08-07 PROCEDURE — 84439 ASSAY OF FREE THYROXINE: CPT | Performed by: EMERGENCY MEDICINE

## 2024-08-07 PROCEDURE — 84484 ASSAY OF TROPONIN QUANT: CPT | Performed by: EMERGENCY MEDICINE

## 2024-08-07 PROCEDURE — 93005 ELECTROCARDIOGRAM TRACING: CPT | Performed by: STUDENT IN AN ORGANIZED HEALTH CARE EDUCATION/TRAINING PROGRAM

## 2024-08-07 PROCEDURE — 71046 X-RAY EXAM CHEST 2 VIEWS: CPT

## 2024-08-07 PROCEDURE — 93005 ELECTROCARDIOGRAM TRACING: CPT | Performed by: PHYSICIAN ASSISTANT

## 2024-08-07 PROCEDURE — 99285 EMERGENCY DEPT VISIT HI MDM: CPT | Mod: 25

## 2024-08-07 PROCEDURE — 83690 ASSAY OF LIPASE: CPT | Performed by: EMERGENCY MEDICINE

## 2024-08-07 PROCEDURE — 84443 ASSAY THYROID STIM HORMONE: CPT | Performed by: EMERGENCY MEDICINE

## 2024-08-07 ASSESSMENT — ACTIVITIES OF DAILY LIVING (ADL)
ADLS_ACUITY_SCORE: 35
ADLS_ACUITY_SCORE: 33
ADLS_ACUITY_SCORE: 35

## 2024-08-07 ASSESSMENT — COLUMBIA-SUICIDE SEVERITY RATING SCALE - C-SSRS
6. HAVE YOU EVER DONE ANYTHING, STARTED TO DO ANYTHING, OR PREPARED TO DO ANYTHING TO END YOUR LIFE?: NO
1. IN THE PAST MONTH, HAVE YOU WISHED YOU WERE DEAD OR WISHED YOU COULD GO TO SLEEP AND NOT WAKE UP?: NO
2. HAVE YOU ACTUALLY HAD ANY THOUGHTS OF KILLING YOURSELF IN THE PAST MONTH?: NO

## 2024-08-07 ASSESSMENT — PAIN SCALES - GENERAL: PAINLEVEL: NO PAIN (0)

## 2024-08-07 NOTE — PROGRESS NOTES
"Patient presents with:  Dizziness: On and off all day- low pulse around 44 bpm- pt never has high BP, Bp was around 145  Foggy and dizziness       Clinical Decision Making:  Patient was noted to have bradycardia and was symptomatic with dizziness.  EKG was performed and did not show abnormality other than the bradycardia.  It was compared to previous EKGs.  Patient is sent to next higher level of care because she has bradycardia symptomatic with dizziness, fogginess and elevated blood pressure with systolic blood pressure of 144.  Patient states that this is high for her.  There was no note of cardiovascular risk factors for the patient.  However patient be sent to the emergency room to have repeat EKG, troponin to rule out any acute coronary syndrome since she is symptomatic with elevated blood pressure, bradycardia and dizziness.       ICD-10-CM    1. Dizziness  R42 EKG 12-lead, tracing only      2. Elevated blood pressure reading without diagnosis of hypertension  R03.0       3. Sinus bradycardia  R00.1           Patient Instructions   Present to the ER for evaluation treatment of your angina        HPI:  Katina Hoffman is a 64 year old female who presents today for 1 day acute onset of dizziness.  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  She did not notice change of her symptoms with lying sitting to standing with orthostatic syncope.  However she states that she has been dizzy and feels foggy and confused.  Has not had overt syncope presyncope vision changes headache chest arm or jaw pain.  She has had diaphoresis associated with her dizziness.  Has had nausea but no overt vomiting.  No shortness of breath or dyspnea on exertion.  Has had heart palpitations today as well.  This morning the symptoms started when she got up at 4 AM to micturate.  She was dizzy and felt \"foggy mentally\".  This has continued throughout " the day.    History obtained from chart review and the patient.    Cardiovascular risk factors    Unmodifiable:  Female. 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  2014-12: Exostosis - left anterolateral skull - no change 2005 --->   2013 - perceived as enlarging on 12/5/14 2014-12: Right flank painv- onset about 11/5/14 - no injury  2012-11: Reflux esophagitis  2012-11: Gastritis and gastroduodenitis  2011-08: Heartburn  2011-08: Nausea without vomiting  2011-08: Constipation  Abnormal Weight Loss  Chest Pain  Vitamin D Deficiency  Foot Pain (Soft Tissue)  Fatigue  Head External Swelling Occipital Left Side (___ Cm)  Abdominal Pain In The Central Upper Belly (Epigastric)  Serum Enzyme Levels - Lipase Elevated  Dizziness - 15 seconds to 4 hoiurs - episodic - no provocatory   maneuvers  Cerumen Impaction  Earache (Symptom)  Tendonitis Of The Achilles Tendon  Tendonitis  Insomnia  Sinus bradycardia      Past Medical History:   Diagnosis Date    Abnormal Pap smear of cervix 11/21/2017    Exostosis 12/5/2014       Social History     Tobacco Use    Smoking status: Never    Smokeless tobacco: Never   Substance Use Topics    Alcohol use: Yes     Alcohol/week: 2.0 standard drinks of alcohol     Comment: Alcoholic Drinks/day: social drink        Review of Systems  As above in HPI otherwise negative.    Vitals:    08/07/24 1541   BP: (!) 144/82   BP Location: Right arm   Patient Position: Sitting   Cuff Size: Adult Small   Pulse: (!) 44   Resp: 18   Temp: 98.9  F (37.2  C)   TempSrc: Oral    SpO2: 100%   Weight: 45.1 kg (99 lb 7 oz)       General: Patient is resting comfortably no acute distress is afebrile  HEENT: Head is normocephalic atraumatic   eyes are PERRL EOMI sclera anicteric   TMs are clear bilaterally  Throat is clear and no exudate  No cervical lymphadenopathy present  LUNGS: Clear to auscultation bilaterally  HEART: Regular rate and rhythm  Skin: Without rash non-diaphoretic    Physical Exam      Labs:  Results for orders placed or performed in visit on 08/07/24   EKG 12-lead, tracing only     Status: None (Preliminary result)   Result Value Ref Range    Systolic Blood Pressure  mmHg    Diastolic Blood Pressure  mmHg    Ventricular Rate 45 BPM    Atrial Rate 45 BPM    MT Interval 108 ms    QRS Duration 80 ms     ms    QTc 385 ms    P Axis 37 degrees    R AXIS 76 degrees    T Axis 56 degrees    Interpretation ECG       Sinus bradycardia with short MT  Voltage criteria for left ventricular hypertrophy  Abnormal ECG  When compared with ECG of 20-Aug-2018 08:50,  No significant change was found         At the end of the encounter, I discussed results, diagnosis, medications. Discussed red flags for immediate return to clinic/ER, as well as indications for follow up if no improvement. Patient understood and agreed to plan. Patient was stable for discharge.

## 2024-08-07 NOTE — ED PROVIDER NOTES
EMERGENCY DEPARTMENT ENCOUNTER      NAME: Katina Hoffman  AGE: 64 year old female  YOB: 1960  MRN: [id number]  EVALUATION DATE & TIME: 2024  4:45 PM    PCP: Padmini Cervantes    ED PROVIDER: Arsalan Wise M.D.      Chief Complaint   Patient presents with    Palpitations    Chest Pain    Bradycardia         FINAL IMPRESSION:  1. Sinus bradycardia          ED COURSE & MEDICAL DECISION MAKIN:14 PM Performed initial evaluation of the patient   7:19 PM repeat exam is benign.  Patient continues to appear quite well and comfortable.  Discussed findings and discharge close follow-up.        Pertinent Labs & Imaging studies reviewed. (See chart for details)  64 year old female presents to the Emergency Department for evaluation of chills. Patient appears non toxic with stable vitals signs, patient afebrile, did note bradycardia but no hypoxia or increased work of breathing. Overall exam is benign.  Again, patient denies any chest pain, loss of consciousness, active palpitations, denies any new medications.  Per review of the medical record, did review office visit through [facility] patient seen for dizziness patient was noted to have bradycardia, apparently patient was sent to the emergency department for repeat EKG, troponin rule out acute coronary syndrome.  Clinically, do not suspect ACS as again patient denies any chest pain, diaphoresis, shortness of breath, appears quite well and comfortable here.  We did obtain ECG which again shows sinus bradycardia with no specific ischemic changes, no concerning dysrhythmias, nothing to suggest AV salazar block, or other concerning dysrhythmia.  No vomiting, diarrhea, low suspicion for anemia or electrolyte abnormality, nothing to suggest severe endocrine dysfunction, nothing is just toxic or medication side effect.  We will obtain screening labs including troponin, chest x-ray.    Reassessment: Labs by my  independent interpretation showed no signs of cardiac ischemia with no significantly elevated troponins, did note TSH of 5.45 but normal free T4, certainly nothing suggest severe endocrine dysfunction at this time.  No signs of anemia with a hemoglobin 4.8 no signs acute kidney injury with creatinine of 0.74.  Chest x-ray returned and by my independent interpretation showed no focal consolidation and by report showed no concerning findings.  Repeat exam is benign.  Again bradycardia does not appear new, no signs of ischemia or heart failure here, discussed these findings with the patient who overall felt very reassured and comfortable with discharge.  Will recommend close follow-up with cardiology in the next 5 to 7 days for continued outpatient management valuation.  Discussed all these finds recommendation with the patient felt reassured and comfortable with discharge.  Return precaution provided.    Medical Decision Making  Obtained supplemental history:Supplemental history obtained?: No  Reviewed external records: External records reviewed?: Documented in chart  Care impacted by chronic illness:N/A  Care significantly affected by social determinants of health:N/A  Did you consider but not order tests?: Work up considered but not performed and documented in chart, if applicable  Did you interpret images independently?: Independent interpretation of ECG and images noted in documentation, when applicable.  Consultation discussion with other provider:Did you involve another provider (consultant, MH, pharmacy, etc.)?: No  Discharge. No recommendations on prescription strength medication(s). See documentation for any additional details.      At the conclusion of the encounter I discussed the results of all of the tests and the disposition. The questions were answered and return precautions provided. The patient or family acknowledged understanding and was agreeable with the care plan.         MEDICATIONS GIVEN IN THE  EMERGENCY:  Medications - No data to display    NEW PRESCRIPTIONS STARTED AT TODAY'S ER VISIT  Discharge Medication List as of 2024  7:24 PM               =================================================================    HPI    Patient information was obtained from: Patient    Use of Intrepreter: N/A        Katina Hoffman is a 64 year old female who presents chills.  Patient states that she woke this morning feeling somewhat chilled, foggy.  Otherwise was able to go to work, however while at work had her heart rate and blood pressure checked and noted that her heart rate was slow and was not advised to go to see a physician.  She continues to endorse feeling chills, foggy but otherwise denies chest pain, shortness of breath, lightheadedness, loss of consciousness, change in bowel or bladder habits, blood in her urine or stools, vomiting or focal weakness.  Was seen at a clinic and noted to be bradycardic and sent for further evaluation.  Patient states that she has been seen by Dr. Rod through cardiology for bradycardia in the past but is otherwise required no medications for this, simply monitoring of symptoms.      REVIEW OF SYSTEMS   Constitutional:  Denies fever, endorses chills  Respiratory:  Denies productive cough or increased work of breathing  Cardiovascular:  Denies chest pain, palpitations  GI:  Denies abdominal pain, nausea, vomiting, or change in bowel or bladder habits   Musculoskeletal:  Denies any new muscle/joint swelling  Skin:  Denies rash   Neurologic:  Denies focal weakness  All systems negative except as marked.     PAST MEDICAL HISTORY:  Past Medical History:   Diagnosis Date    Abnormal Pap smear of cervix 2017    Exostosis 2014       PAST SURGICAL HISTORY:  Past Surgical History:   Procedure Laterality Date    BREAST EXCISIONAL BIOPSY Right 2005    benign     SECTION           CURRENT MEDICATIONS:    Prior to Admission medications    Medication Sig Start Date End  Date Taking? Authorizing Provider   acyclovir (ZOVIRAX) 200 MG capsule Take 2 capsules (400 mg) by mouth every 12 hours 10/11/21   Padmini Cervantes MD   EPINEPHrine (ANY BX GENERIC EQUIV) 0.3 MG/0.3ML injection 2-pack Inject 0.3 mLs (0.3 mg) into the muscle as needed for anaphylaxis May repeat one time in 5-15 minutes if response to initial dose is inadequate. 10/3/22   Padmini Cervantes MD   hydrocortisone (PROCTOSOL HC) 2.5 % rectal cream [HYDROCORTISONE (PROCTOSOL HC) 2.5 % RECTAL CREAM] Apply to the affected area twice daily as needed. 12/17/19   Padmini Cervantes MD   ketorolac (TORADOL) 10 MG tablet Take 1 tablet (10 mg) by mouth every 6 hours as needed for moderate pain 10/18/22   Padmini Cervantes MD   naproxen-diphenhydramine (ALEVE PM) 220-25 mg Tab [NAPROXEN-DIPHENHYDRAMINE (ALEVE PM) 220-25 MG TAB] Take 1 tablet by mouth daily as needed. 4/5/17   Provider, Historical   ondansetron (ZOFRAN-ODT) 4 MG disintegrating tablet [ONDANSETRON (ZOFRAN-ODT) 4 MG DISINTEGRATING TABLET] DISSOLVE 1 TABLET(4 MG) ON THE TONGUE EVERY 8 HOURS AS NEEDED FOR NAUSEA 5/12/20   Padmini Cervantes MD   SUMAtriptan (IMITREX) 50 MG tablet [SUMATRIPTAN (IMITREX) 50 MG TABLET]  10/18/20   Provider, Historical        ALLERGIES:  Allergies   Allergen Reactions    Bee Venom     Doxycycline Other (See Comments)    Penicillins Rash       FAMILY HISTORY:  Family History   Problem Relation Age of Onset    Breast Cancer Maternal Aunt 65    Breast Cancer Paternal Aunt         Unsure of age    Ovarian Cancer No family hx of        SOCIAL HISTORY:   Social History     Socioeconomic History    Marital status:    Tobacco Use    Smoking status: Never    Smokeless tobacco: Never   Substance and Sexual Activity    Alcohol use: Yes     Alcohol/week: 2.0 standard drinks of alcohol     Comment: Alcoholic Drinks/day: social drink     Drug use: No       VITALS:  Patient Vitals for the past 24 hrs:   BP Temp Temp src Pulse Resp  SpO2 Height Weight   08/07/24 1925 -- -- -- 51 -- 95 % -- --   08/07/24 1920 -- -- -- (!) 46 -- 100 % -- --   08/07/24 1915 123/75 -- -- (!) 42 -- 100 % -- --   08/07/24 1910 -- -- -- (!) 47 -- 100 % -- --   08/07/24 1905 -- -- -- (!) 42 -- 100 % -- --   08/07/24 1900 129/82 -- -- (!) 49 -- 100 % -- --   08/07/24 1855 -- -- -- (!) 49 -- 100 % -- --   08/07/24 1850 -- -- -- (!) 44 -- 100 % -- --   08/07/24 1845 125/72 -- -- (!) 42 -- 99 % -- --   08/07/24 1840 -- -- -- (!) 41 -- 99 % -- --   08/07/24 1835 -- -- -- (!) 40 -- 100 % -- --   08/07/24 1830 130/78 -- -- (!) 43 -- 100 % -- --   08/07/24 1825 136/72 -- -- (!) 41 -- 100 % -- --   08/07/24 1815 -- -- -- (!) 49 14 99 % -- --   08/07/24 1800 120/75 -- -- (!) 39 20 99 % -- --   08/07/24 1753 -- -- -- (!) 39 13 100 % -- --   08/07/24 1752 -- -- -- (!) 38 14 100 % -- --   08/07/24 1751 -- -- -- (!) 38 14 100 % -- --   08/07/24 1750 -- -- -- (!) 43 19 100 % -- --   08/07/24 1749 -- -- -- (!) 39 13 100 % -- --   08/07/24 1748 -- -- -- (!) 40 14 100 % -- --   08/07/24 1745 129/75 -- -- (!) 43 15 100 % -- --   08/07/24 1737 -- -- -- (!) 42 25 99 % -- --   08/07/24 1736 -- -- -- 57 (!) 33 100 % -- --   08/07/24 1735 -- -- -- (!) 43 25 100 % -- --   08/07/24 1734 -- -- -- (!) 42 26 100 % -- --   08/07/24 1733 -- -- -- (!) 45 26 100 % -- --   08/07/24 1732 -- -- -- 53 (!) 42 100 % -- --   08/07/24 1731 -- -- -- (!) 44 25 95 % -- --   08/07/24 1730 130/84 -- -- (!) 46 25 99 % -- --   08/07/24 1729 -- -- -- (!) 48 21 92 % -- --   08/07/24 1728 -- -- -- (!) 40 15 100 % -- --   08/07/24 1715 137/88 -- -- 51 23 100 % -- --   08/07/24 1707 -- -- -- 57 28 99 % -- --   08/07/24 1706 -- -- -- (!) 49 (!) 32 100 % -- --   08/07/24 1705 -- -- -- 56 21 100 % -- --   08/07/24 1704 -- -- -- (!) 45 23 100 % -- --   08/07/24 1703 -- -- -- (!) 38 21 100 % -- --   08/07/24 1700 (!) 144/71 -- -- 96 26 100 % -- --   08/07/24 1655 (!) 140/75 -- -- (!) 40 -- 100 % -- --   08/07/24 1628 98/41 98  " F (36.7  C) Oral (!) 43 20 99 % 1.549 m (5' 1\") 44.5 kg (98 lb)        PHYSICAL EXAM    Constitutional:  Awake, alert, in no apparent distress  HENT:  Normocephalic, Atraumatic. Bilateral external ears normal. Oropharynx moist. Nose normal. Neck- Normal range of motion with no guarding, No midline cervical tenderness, Supple, No stridor.   Eyes:  PERRL, EOMI with no signs of entrapment, Conjunctiva normal, No discharge.   Respiratory:  Normal breath sounds, No respiratory distress, No wheezing.    Cardiovascular: Bradycardia, Normal rhythm, No appreciable rubs or gallops.   GI:  Soft, No tenderness, No distension, No palpable masses  Musculoskeletal:  Intact distal pulses, No edema. Good range of motion in all major joints. No tenderness to palpation or major deformities noted.  Integument:  Warm, Dry, No erythema, No rash.   Neurologic:  Alert & oriented, Normal motor function, Normal sensory function, No focal deficits noted.   Psychiatric:  Affect normal, Judgment normal, Mood normal.     LAB:  All pertinent labs reviewed and interpreted.  Results for orders placed or performed during the hospital encounter of 08/07/24   XR Chest 2 Views    Impression    IMPRESSION: Negative chest.   Basic metabolic panel   Result Value Ref Range    Sodium 140 135 - 145 mmol/L    Potassium 4.2 3.4 - 5.3 mmol/L    Chloride 103 98 - 107 mmol/L    Carbon Dioxide (CO2) 28 22 - 29 mmol/L    Anion Gap 9 7 - 15 mmol/L    Urea Nitrogen 14.9 8.0 - 23.0 mg/dL    Creatinine 0.74 0.51 - 0.95 mg/dL    GFR Estimate >90 >60 mL/min/1.73m2    Calcium 9.0 8.8 - 10.4 mg/dL    Glucose 88 70 - 99 mg/dL   Result Value Ref Range    Troponin T, High Sensitivity 7 <=14 ng/L   Result Value Ref Range    Troponin T, High Sensitivity 7 <=14 ng/L   CBC with platelets and differential   Result Value Ref Range    WBC Count 4.4 4.0 - 11.0 10e3/uL    RBC Count 4.16 3.80 - 5.20 10e6/uL    Hemoglobin 12.8 11.7 - 15.7 g/dL    Hematocrit 39.5 35.0 - 47.0 %    MCV 95 " 78 - 100 fL    MCH 30.8 26.5 - 33.0 pg    MCHC 32.4 31.5 - 36.5 g/dL    RDW 13.2 10.0 - 15.0 %    Platelet Count 217 150 - 450 10e3/uL    % Neutrophils 55 %    % Lymphocytes 31 %    % Monocytes 9 %    % Eosinophils 5 %    % Basophils 1 %    % Immature Granulocytes 0 %    NRBCs per 100 WBC 0 <1 /100    Absolute Neutrophils 2.4 1.6 - 8.3 10e3/uL    Absolute Lymphocytes 1.4 0.8 - 5.3 10e3/uL    Absolute Monocytes 0.4 0.0 - 1.3 10e3/uL    Absolute Eosinophils 0.2 0.0 - 0.7 10e3/uL    Absolute Basophils 0.0 0.0 - 0.2 10e3/uL    Absolute Immature Granulocytes 0.0 <=0.4 10e3/uL    Absolute NRBCs 0.0 10e3/uL   Extra Blue Top Tube   Result Value Ref Range    Hold Specimen JIC    Extra Red Top Tube   Result Value Ref Range    Hold Specimen JIC    TSH with free T4 reflex   Result Value Ref Range    TSH 5.54 (H) 0.30 - 4.20 uIU/mL   Hepatic function panel   Result Value Ref Range    Protein Total 6.5 6.4 - 8.3 g/dL    Albumin 4.1 3.5 - 5.2 g/dL    Bilirubin Total 0.6 <=1.2 mg/dL    Alkaline Phosphatase 66 40 - 150 U/L    AST 24 0 - 45 U/L    ALT 15 0 - 50 U/L    Bilirubin Direct <0.20 0.00 - 0.30 mg/dL   Result Value Ref Range    Lipase 134 (H) 13 - 60 U/L   Result Value Ref Range    Free T4 0.94 0.90 - 1.70 ng/dL       RADIOLOGY:  XR Chest 2 Views   Final Result   IMPRESSION: Negative chest.             EKG:    Sinus bradycardia, no specific ST acute ischemic changes, did note nonspecific ST elevation with notching, suspect early repull, when compared to ECG from earlier today no significant change found, did compare ECG to previous ECG August 20, 2018, April 5, 2017, also noted sinus bradycardia not typically changed when compared to today.  I have independently reviewed and interpreted the EKG(s) documented above.    PROCEDURES:        Z[employer]ante System Documentation:   CMS Diagnoses:       I, ARSALAN WISE MD, am serving as a scribe to document services personally performed by Arsalan Wise MD, based on my  observation and the provider's statements to me. I, Arsalan Wise MD attest that ARSALAN WISE MD is acting in a scribe capacity, has observed my performance of the services and has documented them in accordance with my direction.    Arsalan Wise M.D.  Emergency Medicine  [facility] EMERGENCY DEPARTMENT  [address] [place] [id number]  [phone]  Dept: [phone]       Arsalan Wise MD  08/08/24 0006

## 2024-08-07 NOTE — ED TRIAGE NOTES
"Referred from [facility] walk-in clinic for evaluation of bradycardia, palpitations, diaphoresis, and nausea. Symptoms started at 0400 hours. Patient reports feeling \"foggy\" and having chills.      Triage Assessment (Adult)       Row Name 08/07/24 8234          Triage Assessment    Airway WDL WDL        Respiratory WDL    Respiratory WDL WDL        Skin Circulation/Temperature WDL    Skin Circulation/Temperature WDL WDL        Cardiac WDL    Cardiac WDL WDL        Peripheral/Neurovascular WDL    Peripheral Neurovascular WDL WDL        Cognitive/Neuro/Behavioral WDL    Cognitive/Neuro/Behavioral WDL WDL                     "

## 2024-08-08 LAB
ATRIAL RATE - MUSE: 45 BPM
DIASTOLIC BLOOD PRESSURE - MUSE: NORMAL MMHG
INTERPRETATION ECG - MUSE: NORMAL
P AXIS - MUSE: 37 DEGREES
PR INTERVAL - MUSE: 108 MS
QRS DURATION - MUSE: 80 MS
QT - MUSE: 446 MS
QTC - MUSE: 385 MS
R AXIS - MUSE: 76 DEGREES
SYSTOLIC BLOOD PRESSURE - MUSE: NORMAL MMHG
T AXIS - MUSE: 56 DEGREES
VENTRICULAR RATE- MUSE: 45 BPM

## 2024-08-08 PROCEDURE — 93010 ELECTROCARDIOGRAM REPORT: CPT | Performed by: STUDENT IN AN ORGANIZED HEALTH CARE EDUCATION/TRAINING PROGRAM

## 2024-08-09 LAB
ATRIAL RATE - MUSE: 41 BPM
DIASTOLIC BLOOD PRESSURE - MUSE: 88 MMHG
INTERPRETATION ECG - MUSE: NORMAL
P AXIS - MUSE: 43 DEGREES
PR INTERVAL - MUSE: 116 MS
QRS DURATION - MUSE: 84 MS
QT - MUSE: 444 MS
QTC - MUSE: 366 MS
R AXIS - MUSE: 91 DEGREES
SYSTOLIC BLOOD PRESSURE - MUSE: 137 MMHG
T AXIS - MUSE: 81 DEGREES
VENTRICULAR RATE- MUSE: 41 BPM

## 2024-09-05 ENCOUNTER — OFFICE VISIT (OUTPATIENT)
Dept: CARDIOLOGY | Facility: CLINIC | Age: 64
End: 2024-09-05
Payer: COMMERCIAL

## 2024-09-05 ENCOUNTER — ORDERS ONLY (AUTO-RELEASED) (OUTPATIENT)
Dept: CARDIOLOGY | Facility: CLINIC | Age: 64
End: 2024-09-05
Payer: COMMERCIAL

## 2024-09-05 VITALS
WEIGHT: 99.8 LBS | RESPIRATION RATE: 16 BRPM | OXYGEN SATURATION: 99 % | HEART RATE: 50 BPM | SYSTOLIC BLOOD PRESSURE: 113 MMHG | BODY MASS INDEX: 18.86 KG/M2 | DIASTOLIC BLOOD PRESSURE: 77 MMHG

## 2024-09-05 DIAGNOSIS — I49.3 PVC'S (PREMATURE VENTRICULAR CONTRACTIONS): ICD-10-CM

## 2024-09-05 DIAGNOSIS — R00.1 SYMPTOMATIC SINUS BRADYCARDIA: ICD-10-CM

## 2024-09-05 DIAGNOSIS — R00.2 PALPITATIONS: ICD-10-CM

## 2024-09-05 DIAGNOSIS — R00.1 SINUS BRADYCARDIA: ICD-10-CM

## 2024-09-05 DIAGNOSIS — R00.1 SYMPTOMATIC SINUS BRADYCARDIA: Primary | ICD-10-CM

## 2024-09-05 PROCEDURE — 99204 OFFICE O/P NEW MOD 45 MIN: CPT | Performed by: INTERNAL MEDICINE

## 2024-09-05 PROCEDURE — G2211 COMPLEX E/M VISIT ADD ON: HCPCS | Performed by: INTERNAL MEDICINE

## 2024-09-05 RX ORDER — PANTOPRAZOLE SODIUM 40 MG/1
40 TABLET, DELAYED RELEASE ORAL DAILY
COMMUNITY
Start: 2023-12-07

## 2024-09-05 NOTE — PATIENT INSTRUCTIONS
Please contact direct nurses line Monday through Friday 8 AM to 5 PM @ [phone]    After-hours contact cardiology office at [phone].    Plan:    Zio patch monitor   May need to consider further testing such as treadmill stress testing    Keep well hydrated

## 2024-09-05 NOTE — LETTER
9/5/2024    Padmini Cervantes MD  [address]  [place] [address]    RE: Katina Hoffman       Dear Colleague,     I had the [facility].    HEART CARE ENCOUNTER CONSULTATON NOTE      [facility]  [phone]      Assessment/Recommendations   Assessment:   Symptomatic sinus bradycardia.  Patient had heart rates in the 40s and was noticing significant lightheadedness.  Prompted her to come to emergency department as well.  Has long history of sinus bradycardia dating back to 2019.  Palpitations  Premature beats noted to examination likely PVCs, or escape beats  Intermittent lightheadedness  Calcium score, 2019, 0    Plan:  3-day Zio patch monitor.  Patient has longstanding bradycardia.  She is very active including cycling up to miles at a time with no cardiac symptoms.  Today heart rates were in the 50s and she was asymptomatic.  She did have mild nausea the day of her severe bradycardia.  Thyroid function normal.  No medications causing sinus dysfunction.  May need to consider treadmill stress testing to confirm normal chronotropic response  If possible patient may require for permanent pacemaker placement.  At this time no strong indication that she needs emergent permanent pacemaker placement at this time.  Will obtain further diagnostic testing.    Follow-up to be determined.     History of Present Illness/Subjective    HPI: Katina Hoffman is a 64 year old female history of chronic sinus bradycardia who presents to cardiology clinic in consultation for symptomatic bradycardia.    On 8/7/2024 the patient awoke around 4 AM and had some mild nausea.  She felt like she needed to eat some food but noted some significant lightheadedness with standing.  At the time she had noticed her heart rates were around 40 bpm.  She is also feeling some occasional palpitations and extra beats.  Given the symptoms she was advised to come to the  emergency department.  In the emergency department she had an EKG which demonstrated sinus bradycardia heart rate of 41 bpm.  At the time of her EKG she did not have any active symptoms.  She was discharged from the emergency department with close follow-up.    Patient did undergo evaluation for sinus bradycardia in 2018 with cardiac monitoring, calcium scoring, treadmill ECG stress test and was determined to not need permanent pacemaker placement.    Currently patient remains quite active.  She denies any significant lightheadedness with exercise.  Denies any exercise intolerance.  She does have lightheadedness with standing rapidly at times.  She has had no syncopal episodes in the past 2 years.    Echocardiogram Results: Stress test reviewed from 2019.       Physical Examination  Review of Systems   Vitals: /77 (BP Location: Right arm, Patient Position: Sitting, Cuff Size: Adult Small)   Pulse 50   Resp 16   Wt 45.3 kg (99 lb 12.8 oz)   SpO2 99%   BMI 18.86 kg/m    BMI= Body mass index is 18.86 kg/m .  Wt Readings from Last 3 Encounters:   09/05/24 45.3 kg (99 lb 12.8 oz)   08/07/24 44.5 kg (98 lb)   08/07/24 45.1 kg (99 lb 7 oz)        Thin, pleasant   ENT/Mouth: membranes moist, no oral lesions or bleeding gums.      EYES:  no scleral icterus, normal conjunctivae       Chest/Lungs:   lungs are clear to auscultation, no rales or wheezing, no sternal scar, equal chest wall expansion    Cardiovascular:   Bradycardic.  Regular.  1 ectopic beat (PVC).  Normal first and second heart sounds with no murmurs, rubs, or gallops; the carotid, radial and posterior tibial pulses are intact, Jugular venous pressure normal, no edema bilaterally    Abdomen:  no tenderness; bowel sounds are present   Extremities: no cyanosis or clubbing   Skin: no xanthelasma, warm.    Neurologic: normal  bilateral, no tremors     Psychiatric: alert and oriented x3, calm        Please refer above for cardiac ROS details.         Medical History  Surgical History Family History Social History   Past Medical History:   Diagnosis Date     Abnormal Pap smear of cervix 2017     Exostosis 2014     Past Surgical History:   Procedure Laterality Date     BREAST EXCISIONAL BIOPSY Right 2005    benign      SECTION       Family History   Problem Relation Age of Onset     Breast Cancer Maternal Aunt 65     Breast Cancer Paternal Aunt         Unsure of age     Ovarian Cancer No family hx of         Social History     Socioeconomic History     Marital status:      Spouse name: Not on file     Number of children: Not on file     Years of education: Not on file     Highest education level: Not on file   Occupational History     Not on file   Tobacco Use     Smoking status: Never     Smokeless tobacco: Never   Substance and Sexual Activity     Alcohol use: Yes     Alcohol/week: 2.0 standard drinks of alcohol     Comment: Alcoholic Drinks/day: social drink      Drug use: No     Sexual activity: Not on file   Other Topics Concern     Not on file   Social History Narrative     Not on file     Social Determinants of Health     Financial Resource Strain: Not on file   Food Insecurity: Not on file   Transportation Needs: Not on file   Physical Activity: Not on file   Stress: Not on file   Social Connections: Not on file   Interpersonal Safety: Not on file   Housing Stability: Not on file           Medications  Allergies   Current Outpatient Medications   Medication Sig Dispense Refill     acyclovir (ZOVIRAX) 200 MG capsule Take 2 capsules (400 mg) by mouth every 12 hours 120 capsule 3     hydrocortisone (PROCTOSOL HC) 2.5 % rectal cream [HYDROCORTISONE (PROCTOSOL HC) 2.5 % RECTAL CREAM] Apply to the affected area twice daily as needed. 28.35 g 0     ketorolac (TORADOL) 10 MG tablet Take 1 tablet (10 mg) by mouth every 6 hours as needed for moderate pain 30 tablet 1     naproxen-diphenhydramine (ALEVE PM) 220-25 mg Tab  "[NAPROXEN-DIPHENHYDRAMINE (ALEVE PM) 220-25 MG TAB] Take 1 tablet by mouth daily as needed.       ondansetron (ZOFRAN-ODT) 4 MG disintegrating tablet [ONDANSETRON (ZOFRAN-ODT) 4 MG DISINTEGRATING TABLET] DISSOLVE 1 TABLET(4 MG) ON THE TONGUE EVERY 8 HOURS AS NEEDED FOR NAUSEA 12 tablet 1     pantoprazole (PROTONIX) 40 MG EC tablet Take 40 mg by mouth daily.       SUMAtriptan (IMITREX) 50 MG tablet at onset of headache.       EPINEPHrine (ANY BX GENERIC EQUIV) 0.3 MG/0.3ML injection 2-pack Inject 0.3 mLs (0.3 mg) into the muscle as needed for anaphylaxis May repeat one time in 5-15 minutes if response to initial dose is inadequate. 2 each 1       Allergies   Allergen Reactions     Bee Venom      Doxycycline Other (See Comments)     Penicillins Rash          Lab Results    Chemistry/lipid CBC Cardiac Enzymes/BNP/TSH/INR   Recent Labs   Lab Test 11/23/22  1651   CHOL 222*   HDL 98   *   TRIG 48     Recent Labs   Lab Test 11/23/22  1651 04/06/17  0553   * 97     Recent Labs   Lab Test 08/07/24  1720      POTASSIUM 4.2   CHLORIDE 103   CO2 28   GLC 88   BUN 14.9   CR 0.74   GFRESTIMATED >90   TO 9.0     Recent Labs   Lab Test 08/07/24  1720 08/01/23  0904 05/31/23  1137   CR 0.74 0.69 0.61     Recent Labs   Lab Test 11/23/22  1651   A1C 5.6          Recent Labs   Lab Test 08/07/24  1720   WBC 4.4   HGB 12.8   HCT 39.5   MCV 95        Recent Labs   Lab Test 08/07/24  1720 10/11/21  0808 01/05/21  1705   HGB 12.8 13.1 13.2    No results for input(s): \"TROPONINI\" in the last 01736 hours.  No results for input(s): \"BNP\", \"NTBNPI\", \"NTBNP\" in the last 27363 hours.  Recent Labs   Lab Test 08/07/24  1720   TSH 5.54*     No results for input(s): \"INR\" in the last 87170 hours.     Gamal Bynum DO  Cardiologist     45 minutes spent by me on the date of the encounter doing chart review, history and exam, documentation and further activities per the note.  Reviewed prior echocardiogram, reviewed " prior cardiac monitor, reviewed prior echo stress test.  Reviewed ECG from ED.  Personal conversation with patient regarding sinus bradycardia.  Discussed indication for pacemaker.    The longitudinal plan of care for the diagnosis(es)/condition(s) as documented were addressed during this visit. Due to the added complexity in care, I will continue to support Adilene in the subsequent management and with ongoing continuity of care.  Patient will need follow-up on cardiac monitor.  Will need further testing and ongoing monitoring of her bradycardia.                            Thank you for allowing me to participate in the care of your patient.      Sincerely,     Gamal Bynum [facility]  cc:   Referred MD Jeanmarie  No address on file

## 2024-09-05 NOTE — PROGRESS NOTES
HEART CARE ENCOUNTER CONSULTATON NOTE      [facility]  [phone]      Assessment/Recommendations   Assessment:   Symptomatic sinus bradycardia.  Patient had heart rates in the 40s and was noticing significant lightheadedness.  Prompted her to come to emergency department as well.  Has long history of sinus bradycardia dating back to 2019.  Palpitations  Premature beats noted to examination likely PVCs, or escape beats  Intermittent lightheadedness  Calcium score, 2019, 0    Plan:  3-day Zio patch monitor.  Patient has longstanding bradycardia.  She is very active including cycling up to miles at a time with no cardiac symptoms.  Today heart rates were in the 50s and she was asymptomatic.  She did have mild nausea the day of her severe bradycardia.  Thyroid function normal.  No medications causing sinus dysfunction.  May need to consider treadmill stress testing to confirm normal chronotropic response  If possible patient may require for permanent pacemaker placement.  At this time no strong indication that she needs emergent permanent pacemaker placement at this time.  Will obtain further diagnostic testing.    Follow-up to be determined.     History of Present Illness/Subjective    HPI: Katina Hoffman is a 64 year old female history of chronic sinus bradycardia who presents to cardiology clinic in consultation for symptomatic bradycardia.    On 8/7/2024 the patient awoke around 4 AM and had some mild nausea.  She felt like she needed to eat some food but noted some significant lightheadedness with standing.  At the time she had noticed her heart rates were around 40 bpm.  She is also feeling some occasional palpitations and extra beats.  Given the symptoms she was advised to come to the emergency department.  In the emergency department she had an EKG which demonstrated sinus bradycardia heart rate of 41 bpm.  At the time of her EKG she did not have any active symptoms.  She was discharged  from the emergency department with close follow-up.    Patient did undergo evaluation for sinus bradycardia in 2018 with cardiac monitoring, calcium scoring, treadmill ECG stress test and was determined to not need permanent pacemaker placement.    Currently patient remains quite active.  She denies any significant lightheadedness with exercise.  Denies any exercise intolerance.  She does have lightheadedness with standing rapidly at times.  She has had no syncopal episodes in the past 2 years.    Echocardiogram Results: Stress test reviewed from 2019.       Physical Examination  Review of Systems   Vitals: /77 (BP Location: Right arm, Patient Position: Sitting, Cuff Size: Adult Small)   Pulse 50   Resp 16   Wt 45.3 kg (99 lb 12.8 oz)   SpO2 99%   BMI 18.86 kg/m    BMI= Body mass index is 18.86 kg/m .  Wt Readings from Last 3 Encounters:   09/05/24 45.3 kg (99 lb 12.8 oz)   08/07/24 44.5 kg (98 lb)   08/07/24 45.1 kg (99 lb 7 oz)        Thin, pleasant   ENT/Mouth: membranes moist, no oral lesions or bleeding gums.      EYES:  no scleral icterus, normal conjunctivae       Chest/Lungs:   lungs are clear to auscultation, no rales or wheezing, no sternal scar, equal chest wall expansion    Cardiovascular:   Bradycardic.  Regular.  1 ectopic beat (PVC).  Normal first and second heart sounds with no murmurs, rubs, or gallops; the carotid, radial and posterior tibial pulses are intact, Jugular venous pressure normal, no edema bilaterally    Abdomen:  no tenderness; bowel sounds are present   Extremities: no cyanosis or clubbing   Skin: no xanthelasma, warm.    Neurologic: normal  bilateral, no tremors     Psychiatric: alert and oriented x3, calm        Please refer above for cardiac ROS details.        Medical History  Surgical History Family History Social History   Past Medical History:   Diagnosis Date    Abnormal Pap smear of cervix 11/21/2017    Exostosis 12/5/2014     Past Surgical History:   Procedure  Laterality Date    BREAST EXCISIONAL BIOPSY Right 2005    benign     SECTION       Family History   Problem Relation Age of Onset    Breast Cancer Maternal Aunt 65    Breast Cancer Paternal Aunt         Unsure of age    Ovarian Cancer No family hx of         Social History     Socioeconomic History    Marital status:      Spouse name: Not on file    Number of children: Not on file    Years of education: Not on file    Highest education level: Not on file   Occupational History    Not on file   Tobacco Use    Smoking status: Never    Smokeless tobacco: Never   Substance and Sexual Activity    Alcohol use: Yes     Alcohol/week: 2.0 standard drinks of alcohol     Comment: Alcoholic Drinks/day: social drink     Drug use: No    Sexual activity: Not on file   Other Topics Concern    Not on file   Social History Narrative    Not on file     Social Determinants of Health     Financial Resource Strain: Not on file   Food Insecurity: Not on file   Transportation Needs: Not on file   Physical Activity: Not on file   Stress: Not on file   Social Connections: Not on file   Interpersonal Safety: Not on file   Housing Stability: Not on file           Medications  Allergies   Current Outpatient Medications   Medication Sig Dispense Refill    acyclovir (ZOVIRAX) 200 MG capsule Take 2 capsules (400 mg) by mouth every 12 hours 120 capsule 3    hydrocortisone (PROCTOSOL HC) 2.5 % rectal cream [HYDROCORTISONE (PROCTOSOL HC) 2.5 % RECTAL CREAM] Apply to the affected area twice daily as needed. 28.35 g 0    ketorolac (TORADOL) 10 MG tablet Take 1 tablet (10 mg) by mouth every 6 hours as needed for moderate pain 30 tablet 1    naproxen-diphenhydramine (ALEVE PM) 220-25 mg Tab [NAPROXEN-DIPHENHYDRAMINE (ALEVE PM) 220-25 MG TAB] Take 1 tablet by mouth daily as needed.      ondansetron (ZOFRAN-ODT) 4 MG disintegrating tablet [ONDANSETRON (ZOFRAN-ODT) 4 MG DISINTEGRATING TABLET] DISSOLVE 1 TABLET(4 MG) ON THE TONGUE EVERY 8  "HOURS AS NEEDED FOR NAUSEA 12 tablet 1    pantoprazole (PROTONIX) 40 MG EC tablet Take 40 mg by mouth daily.      SUMAtriptan (IMITREX) 50 MG tablet at onset of headache.      EPINEPHrine (ANY BX GENERIC EQUIV) 0.3 MG/0.3ML injection 2-pack Inject 0.3 mLs (0.3 mg) into the muscle as needed for anaphylaxis May repeat one time in 5-15 minutes if response to initial dose is inadequate. 2 each 1       Allergies   Allergen Reactions    Bee Venom     Doxycycline Other (See Comments)    Penicillins Rash          Lab Results    Chemistry/lipid CBC Cardiac Enzymes/BNP/TSH/INR   Recent Labs   Lab Test 11/23/22  1651   CHOL 222*   HDL 98   *   TRIG 48     Recent Labs   Lab Test 11/23/22  1651 04/06/17  0553   * 97     Recent Labs   Lab Test 08/07/24  1720      POTASSIUM 4.2   CHLORIDE 103   CO2 28   GLC 88   BUN 14.9   CR 0.74   GFRESTIMATED >90   TO 9.0     Recent Labs   Lab Test 08/07/24  1720 08/01/23  0904 05/31/23  1137   CR 0.74 0.69 0.61     Recent Labs   Lab Test 11/23/22  1651   A1C 5.6          Recent Labs   Lab Test 08/07/24  1720   WBC 4.4   HGB 12.8   HCT 39.5   MCV 95        Recent Labs   Lab Test 08/07/24  1720 10/11/21  0808 01/05/21  1705   HGB 12.8 13.1 13.2    No results for input(s): \"TROPONINI\" in the last 24735 hours.  No results for input(s): \"BNP\", \"NTBNPI\", \"NTBNP\" in the last 60691 hours.  Recent Labs   Lab Test 08/07/24  1720   TSH 5.54*     No results for input(s): \"INR\" in the last 34245 hours.     Gamal Bynum DO  Cardiologist     45 minutes spent by me on the date of the encounter doing chart review, history and exam, documentation and further activities per the note.  Reviewed prior echocardiogram, reviewed prior cardiac monitor, reviewed prior echo stress test.  Reviewed ECG from ED.  Personal conversation with patient regarding sinus bradycardia.  Discussed indication for pacemaker.    The longitudinal plan of care for the diagnosis(es)/condition(s) as " documented were addressed during this visit. Due to the added complexity in care, I will continue to support Adilene in the subsequent management and with ongoing continuity of care.  Patient will need follow-up on cardiac monitor.  Will need further testing and ongoing monitoring of her bradycardia.

## 2024-09-27 PROCEDURE — 93244 EXT ECG>48HR<7D REV&INTERPJ: CPT | Performed by: INTERNAL MEDICINE

## 2024-10-04 NOTE — TELEPHONE ENCOUNTER
[facility]   [phone]     Prolia

## 2025-01-06 ENCOUNTER — LAB (OUTPATIENT)
Dept: LAB | Facility: HOSPITAL | Age: 65
End: 2025-01-06
Payer: COMMERCIAL

## 2025-01-06 ENCOUNTER — TELEPHONE (OUTPATIENT)
Dept: ENDOCRINOLOGY | Facility: CLINIC | Age: 65
End: 2025-01-06

## 2025-01-06 DIAGNOSIS — M81.8 OTHER OSTEOPOROSIS WITHOUT CURRENT PATHOLOGICAL FRACTURE: ICD-10-CM

## 2025-01-06 DIAGNOSIS — Z92.29 PERSONAL HISTORY OF DRUG THERAPY: ICD-10-CM

## 2025-01-06 DIAGNOSIS — M81.8 OTHER OSTEOPOROSIS WITHOUT CURRENT PATHOLOGICAL FRACTURE: Primary | ICD-10-CM

## 2025-01-06 LAB
CALCIUM SERPL-MCNC: 9.5 MG/DL (ref 8.8–10.4)
CREAT SERPL-MCNC: 0.58 MG/DL (ref 0.51–0.95)
EGFRCR SERPLBLD CKD-EPI 2021: >90 ML/MIN/1.73M2

## 2025-01-06 PROCEDURE — 82565 ASSAY OF CREATININE: CPT

## 2025-01-06 PROCEDURE — 36415 COLL VENOUS BLD VENIPUNCTURE: CPT

## 2025-01-06 PROCEDURE — 82310 ASSAY OF CALCIUM: CPT

## 2025-01-06 NOTE — TELEPHONE ENCOUNTER
Fyi- spoke w/patient she is at [facility] and will have labs done there today. Patient is aware she will needs to complete labs by today.

## 2025-01-08 ENCOUNTER — ALLIED HEALTH/NURSE VISIT (OUTPATIENT)
Dept: ENDOCRINOLOGY | Facility: CLINIC | Age: 65
End: 2025-01-08
Payer: COMMERCIAL

## 2025-01-08 DIAGNOSIS — M81.8 OTHER OSTEOPOROSIS WITHOUT CURRENT PATHOLOGICAL FRACTURE: Primary | ICD-10-CM

## 2025-01-08 PROCEDURE — 99207 PR NO CHARGE NURSE ONLY: CPT | Performed by: NURSE PRACTITIONER

## 2025-01-08 PROCEDURE — 96372 THER/PROPH/DIAG INJ SC/IM: CPT | Performed by: NURSE PRACTITIONER

## 2025-01-08 PROCEDURE — 99207 PR NO CHARGE NURSE ONLY: CPT

## 2025-01-08 NOTE — PROGRESS NOTES
Clinic Administered Medication Documentation      Prolia Documentation    Indication: Prolia  (denosumab) is a prescription medicine used to treat osteoporosis in patients who:   Are at high risk for fracture, meaning patients who have had a fracture related to osteoporosis, or who have multiple risk factors for fracture.  Cannot use another osteoporosis medicine or other osteoporosis medicines did not work well.  The timeline for early/late injections would be 4 weeks early and any time after the 6 month sadiq. If a patient receives their injection late, then the subsequent injection would be 6 months from the date that they actually received the injection.    When was the last injection?  24  Was the last injection at least 6 months ago? Yes  Has the prior authorization been completed?  Yes  Is there an active order (written within the past 365 days, with administrations remaining, not ) in the chart?  Yes   GFR Estimate   Date Value Ref Range Status   2025 >90 >60 mL/min/1.73m2 Final     Comment:     eGFR calculated using  CKD-EPI equation.   2021 >60 >60 mL/min/1.73m2 Final     Has patient had a GFR within the last 12 months? Yes   Is GFR under 30, or patient has a diagnosis of CKD4 or CKD5? No   Patient denies gastric bypass or parathyroid surgery in past 6 months? Yes - patient denies.   Patient denies dental work in the past two months involving drilling into the bone, such as implants/extractions, oral surgery or a tooth extraction that has not healed yet?  Yes  Patient denies plans for an emergency tooth extraction within the next week? Yes    The following steps were completed to comply with the REMS program for Prolia:  Reviewed information in the Medication Guide, including the serious risks of Prolia  and the symptoms of each risk.  Advised patient to seek prompt medical attention if they have signs or symptoms of any of the serious risks.  Provided each patient a copy of the  Medication Guide and Patient Guide.    Prior to injection, verified patient identity using patient's name and date of birth. Medication was administered. Please see MAR and medication order for additional information. Patient instructed to remain in clinic for 15 minutes and report any adverse reaction to staff immediately.    Vial/Syringe: Syringe  Was this medication supplied by the patient? No  Verified that the patient has administrations remaining in their prescription.

## 2025-01-19 ENCOUNTER — HEALTH MAINTENANCE LETTER (OUTPATIENT)
Age: 65
End: 2025-01-19

## 2025-03-31 ENCOUNTER — ANCILLARY ORDERS (OUTPATIENT)
Dept: MAMMOGRAPHY | Facility: HOSPITAL | Age: 65
End: 2025-03-31
Payer: COMMERCIAL

## 2025-03-31 ENCOUNTER — LAB REQUISITION (OUTPATIENT)
Dept: LAB | Facility: CLINIC | Age: 65
End: 2025-03-31
Payer: COMMERCIAL

## 2025-03-31 DIAGNOSIS — Z13.228 ENCOUNTER FOR SCREENING FOR OTHER METABOLIC DISORDERS: ICD-10-CM

## 2025-03-31 DIAGNOSIS — Z13.0 ENCOUNTER FOR SCREENING FOR DISEASES OF THE BLOOD AND BLOOD-FORMING ORGANS AND CERTAIN DISORDERS INVOLVING THE IMMUNE MECHANISM: ICD-10-CM

## 2025-03-31 DIAGNOSIS — Z12.4 ENCOUNTER FOR SCREENING FOR MALIGNANT NEOPLASM OF CERVIX: ICD-10-CM

## 2025-03-31 DIAGNOSIS — Z12.31 VISIT FOR SCREENING MAMMOGRAM: Primary | ICD-10-CM

## 2025-03-31 DIAGNOSIS — Z13.29 ENCOUNTER FOR SCREENING FOR OTHER SUSPECTED ENDOCRINE DISORDER: ICD-10-CM

## 2025-03-31 DIAGNOSIS — Z13.1 ENCOUNTER FOR SCREENING FOR DIABETES MELLITUS: ICD-10-CM

## 2025-03-31 LAB
EST. AVERAGE GLUCOSE BLD GHB EST-MCNC: 108 MG/DL
HBA1C MFR BLD: 5.4 %

## 2025-03-31 PROCEDURE — 83036 HEMOGLOBIN GLYCOSYLATED A1C: CPT | Mod: ORL | Performed by: OBSTETRICS & GYNECOLOGY

## 2025-03-31 PROCEDURE — G0145 SCR C/V CYTO,THINLAYER,RESCR: HCPCS | Mod: ORL | Performed by: OBSTETRICS & GYNECOLOGY

## 2025-03-31 PROCEDURE — 83690 ASSAY OF LIPASE: CPT | Mod: ORL | Performed by: OBSTETRICS & GYNECOLOGY

## 2025-03-31 PROCEDURE — 80061 LIPID PANEL: CPT | Mod: ORL | Performed by: OBSTETRICS & GYNECOLOGY

## 2025-03-31 PROCEDURE — 82150 ASSAY OF AMYLASE: CPT | Mod: ORL | Performed by: OBSTETRICS & GYNECOLOGY

## 2025-03-31 PROCEDURE — 87624 HPV HI-RISK TYP POOLED RSLT: CPT | Mod: ORL | Performed by: OBSTETRICS & GYNECOLOGY

## 2025-03-31 PROCEDURE — 80053 COMPREHEN METABOLIC PANEL: CPT | Mod: ORL | Performed by: OBSTETRICS & GYNECOLOGY

## 2025-04-01 ENCOUNTER — PATIENT OUTREACH (OUTPATIENT)
Dept: CARE COORDINATION | Facility: CLINIC | Age: 65
End: 2025-04-01
Payer: COMMERCIAL

## 2025-04-01 LAB
ALBUMIN SERPL BCG-MCNC: 4.3 G/DL (ref 3.5–5.2)
ALP SERPL-CCNC: 63 U/L (ref 40–150)
ALT SERPL W P-5'-P-CCNC: 16 U/L (ref 0–50)
AMYLASE SERPL-CCNC: 170 U/L (ref 28–100)
ANION GAP SERPL CALCULATED.3IONS-SCNC: 11 MMOL/L (ref 7–15)
AST SERPL W P-5'-P-CCNC: 31 U/L (ref 0–45)
BILIRUB SERPL-MCNC: 0.9 MG/DL
BUN SERPL-MCNC: 12.9 MG/DL (ref 8–23)
CALCIUM SERPL-MCNC: 9.5 MG/DL (ref 8.8–10.4)
CHLORIDE SERPL-SCNC: 101 MMOL/L (ref 98–107)
CHOLEST SERPL-MCNC: 252 MG/DL
CREAT SERPL-MCNC: 0.6 MG/DL (ref 0.51–0.95)
EGFRCR SERPLBLD CKD-EPI 2021: >90 ML/MIN/1.73M2
FASTING STATUS PATIENT QL REPORTED: NO
GLUCOSE SERPL-MCNC: 76 MG/DL (ref 70–99)
HCO3 SERPL-SCNC: 27 MMOL/L (ref 22–29)
HDLC SERPL-MCNC: 92 MG/DL
LDLC SERPL CALC-MCNC: 130 MG/DL
LIPASE SERPL-CCNC: 120 U/L (ref 13–60)
NONHDLC SERPL-MCNC: 160 MG/DL
POTASSIUM SERPL-SCNC: 4.5 MMOL/L (ref 3.4–5.3)
PROT SERPL-MCNC: 6.8 G/DL (ref 6.4–8.3)
SODIUM SERPL-SCNC: 139 MMOL/L (ref 135–145)
TRIGL SERPL-MCNC: 152 MG/DL

## 2025-04-03 LAB
HPV HR 12 DNA CVX QL NAA+PROBE: POSITIVE
HPV16 DNA CVX QL NAA+PROBE: NEGATIVE
HPV18 DNA CVX QL NAA+PROBE: POSITIVE
HUMAN PAPILLOMA VIRUS FINAL DIAGNOSIS: ABNORMAL

## 2025-04-04 LAB
BKR AP ASSOCIATED HPV REPORT: ABNORMAL
BKR LAB AP GYN ADEQUACY: ABNORMAL
BKR LAB AP GYN INTERPRETATION: ABNORMAL
BKR LAB AP LMP: ABNORMAL
BKR LAB AP PREVIOUS ABNL DX: ABNORMAL
BKR LAB AP PREVIOUS ABNORMAL: ABNORMAL
PATH REPORT.COMMENTS IMP SPEC: ABNORMAL
PATH REPORT.COMMENTS IMP SPEC: ABNORMAL
PATH REPORT.RELEVANT HX SPEC: ABNORMAL

## 2025-04-04 PROCEDURE — 88141 CYTOPATH C/V INTERPRET: CPT | Performed by: PATHOLOGY

## 2025-05-09 ENCOUNTER — ANCILLARY PROCEDURE (OUTPATIENT)
Dept: MAMMOGRAPHY | Facility: CLINIC | Age: 65
End: 2025-05-09
Attending: OBSTETRICS & GYNECOLOGY
Payer: COMMERCIAL

## 2025-05-09 DIAGNOSIS — Z12.31 VISIT FOR SCREENING MAMMOGRAM: ICD-10-CM

## 2025-05-09 PROCEDURE — 77063 BREAST TOMOSYNTHESIS BI: CPT

## 2025-05-12 ENCOUNTER — LAB REQUISITION (OUTPATIENT)
Dept: LAB | Facility: CLINIC | Age: 65
End: 2025-05-12
Payer: COMMERCIAL

## 2025-05-12 DIAGNOSIS — R87.613 HIGH GRADE SQUAMOUS INTRAEPITHELIAL LESION ON CYTOLOGIC SMEAR OF CERVIX (HGSIL): ICD-10-CM

## 2025-05-12 PROCEDURE — 88342 IMHCHEM/IMCYTCHM 1ST ANTB: CPT | Mod: TC,ORL | Performed by: OBSTETRICS & GYNECOLOGY

## 2025-05-27 LAB
PATH REPORT.COMMENTS IMP SPEC: NORMAL
PATH REPORT.FINAL DX SPEC: NORMAL
PATH REPORT.GROSS SPEC: NORMAL
PATH REPORT.MICROSCOPIC SPEC OTHER STN: NORMAL
PATH REPORT.RELEVANT HX SPEC: NORMAL
PHOTO IMAGE: NORMAL

## 2025-05-27 PROCEDURE — 88305 TISSUE EXAM BY PATHOLOGIST: CPT | Mod: 26 | Performed by: OBSTETRICS & GYNECOLOGY

## 2025-05-27 PROCEDURE — 88342 IMHCHEM/IMCYTCHM 1ST ANTB: CPT | Mod: 26 | Performed by: OBSTETRICS & GYNECOLOGY

## 2025-05-27 PROCEDURE — 88307 TISSUE EXAM BY PATHOLOGIST: CPT | Mod: 26 | Performed by: OBSTETRICS & GYNECOLOGY

## 2025-06-05 DIAGNOSIS — Z92.29 PERSONAL HISTORY OF DRUG THERAPY: ICD-10-CM

## 2025-06-05 DIAGNOSIS — M81.8 OTHER OSTEOPOROSIS WITHOUT CURRENT PATHOLOGICAL FRACTURE: Primary | ICD-10-CM

## 2025-06-25 ENCOUNTER — LAB (OUTPATIENT)
Dept: LAB | Facility: HOSPITAL | Age: 65
End: 2025-06-25
Payer: COMMERCIAL

## 2025-06-25 DIAGNOSIS — M81.8 OTHER OSTEOPOROSIS WITHOUT CURRENT PATHOLOGICAL FRACTURE: ICD-10-CM

## 2025-06-25 LAB
CALCIUM SERPL-MCNC: 8.8 MG/DL (ref 8.8–10.4)
CREAT SERPL-MCNC: 0.64 MG/DL (ref 0.51–0.95)
EGFRCR SERPLBLD CKD-EPI 2021: >90 ML/MIN/1.73M2
VIT D+METAB SERPL-MCNC: 30 NG/ML (ref 20–50)

## 2025-06-25 PROCEDURE — 82565 ASSAY OF CREATININE: CPT

## 2025-06-25 PROCEDURE — 82306 VITAMIN D 25 HYDROXY: CPT

## 2025-06-25 PROCEDURE — 82310 ASSAY OF CALCIUM: CPT

## 2025-06-25 PROCEDURE — 36415 COLL VENOUS BLD VENIPUNCTURE: CPT

## 2025-06-26 ENCOUNTER — TELEPHONE (OUTPATIENT)
Dept: ENDOCRINOLOGY | Facility: CLINIC | Age: 65
End: 2025-06-26
Payer: COMMERCIAL

## 2025-06-26 DIAGNOSIS — M81.8 OTHER OSTEOPOROSIS WITHOUT CURRENT PATHOLOGICAL FRACTURE: Primary | ICD-10-CM

## 2025-06-26 NOTE — TELEPHONE ENCOUNTER
Spoke to pt- she eats yogurt, cheese. Pt says she eats 1 yogurt a day and a couple of string cheese a day.     Spoke to provide pt to increase calcium intake and recheck on 7/2.     Spoke to pt and informed.  Pt verbalized understanding.

## 2025-06-27 ENCOUNTER — RESULTS FOLLOW-UP (OUTPATIENT)
Dept: ENDOCRINOLOGY | Facility: CLINIC | Age: 65
End: 2025-06-27

## 2025-06-30 ENCOUNTER — HOSPITAL ENCOUNTER (OUTPATIENT)
Dept: CT IMAGING | Facility: HOSPITAL | Age: 65
Discharge: HOME | End: 2025-06-30
Attending: FAMILY MEDICINE | Admitting: FAMILY MEDICINE
Payer: COMMERCIAL

## 2025-06-30 DIAGNOSIS — D16.4 OSTEOMA OF SKULL: ICD-10-CM

## 2025-06-30 PROCEDURE — 70450 CT HEAD/BRAIN W/O DYE: CPT

## 2025-07-02 ENCOUNTER — LAB (OUTPATIENT)
Dept: LAB | Facility: HOSPITAL | Age: 65
End: 2025-07-02
Payer: COMMERCIAL

## 2025-07-02 DIAGNOSIS — M81.8 OTHER OSTEOPOROSIS WITHOUT CURRENT PATHOLOGICAL FRACTURE: ICD-10-CM

## 2025-07-02 LAB — CALCIUM SERPL-MCNC: 9.3 MG/DL (ref 8.8–10.4)

## 2025-07-02 PROCEDURE — 36415 COLL VENOUS BLD VENIPUNCTURE: CPT

## 2025-07-02 PROCEDURE — 82310 ASSAY OF CALCIUM: CPT

## 2025-07-09 ENCOUNTER — ALLIED HEALTH/NURSE VISIT (OUTPATIENT)
Dept: ENDOCRINOLOGY | Facility: CLINIC | Age: 65
End: 2025-07-09
Payer: COMMERCIAL

## 2025-07-09 DIAGNOSIS — M81.8 OTHER OSTEOPOROSIS WITHOUT CURRENT PATHOLOGICAL FRACTURE: Primary | ICD-10-CM

## 2025-07-09 PROCEDURE — 99207 PR NO CHARGE NURSE ONLY: CPT

## 2025-07-09 PROCEDURE — 96372 THER/PROPH/DIAG INJ SC/IM: CPT | Performed by: NURSE PRACTITIONER

## 2025-07-09 PROCEDURE — 99207 PR NO CHARGE NURSE ONLY: CPT | Performed by: NURSE PRACTITIONER

## 2025-07-09 NOTE — PROGRESS NOTES
Clinic Administered Medication Documentation      Prolia Documentation    Indication: Prolia  (denosumab) is a prescription medicine used to treat osteoporosis in patients who:   Are at high risk for fracture, meaning patients who have had a fracture related to osteoporosis, or who have multiple risk factors for fracture.  Cannot use another osteoporosis medicine or other osteoporosis medicines did not work well.  The timeline for early/late injections would be 4 weeks early and any time after the 6 month sadiq. If a patient receives their injection late, then the subsequent injection would be 6 months from the date that they actually received the injection.    When was the last injection?  25  Was the last injection at least 6 months ago? Yes  Has the prior authorization been completed?  Yes  Is there an active order (written within the past 365 days, with administrations remaining, not ) in the chart?  Yes   Calcium   Date Value Ref Range Status   2025 9.3 8.8 - 10.4 mg/dL Final     Has patient had a Calcium test in the last 12 months? Yes   Is the calcium result 8.8 or above? Yes   GFR Estimate   Date Value Ref Range Status   2025 >90 >60 mL/min/1.73m2 Final     Comment:     eGFR calculated using  CKD-EPI equation.   2021 >60 >60 mL/min/1.73m2 Final     Has patient had a GFR within the last 12 months? Yes   Is GFR under 30, or patient has a diagnosis of CKD4 or CKD5? No   Patient denies gastric bypass or parathyroid surgery in past 6 months? Yes - patient denies.   Patient denies undergoing any dental procedures involving drilling into the bone, such as implants, extractions, or oral surgery, within the past two months that have not yet healed?  Yes - patient denies  Patient denies plans for an emergency tooth extraction within the next week? Yes    The following steps were completed to comply with the REMS program for Prolia:  Reviewed information in the Medication Guide,  including the serious risks of Prolia  and the symptoms of each risk.  Advised patient to seek prompt medical attention if they have signs or symptoms of any of the serious risks.  Provided each patient a copy of the Medication Guide and Patient Guide.    Prior to injection, verified patient identity using patient's name and date of birth. Medication was administered. Please see MAR and medication order for additional information. Patient instructed to remain in clinic for 15 minutes and report any adverse reaction to staff immediately.    Vial/Syringe: Syringe  Was this medication supplied by the patient? No  Verified that the patient has administrations remaining in their prescription.

## 2025-07-14 ENCOUNTER — HOSPITAL ENCOUNTER (OUTPATIENT)
Dept: MRI IMAGING | Facility: HOSPITAL | Age: 65
Discharge: HOME | End: 2025-07-14
Attending: INTERNAL MEDICINE | Admitting: INTERNAL MEDICINE
Payer: COMMERCIAL

## 2025-07-14 DIAGNOSIS — R74.8 ELEVATED AMYLASE AND LIPASE: ICD-10-CM

## 2025-07-14 DIAGNOSIS — K59.00 CONSTIPATION, UNSPECIFIED CONSTIPATION TYPE: ICD-10-CM

## 2025-07-14 DIAGNOSIS — K21.9 GERD WITHOUT ESOPHAGITIS: ICD-10-CM

## 2025-07-14 DIAGNOSIS — R10.9 RIGHT SIDED ABDOMINAL PAIN: ICD-10-CM

## 2025-07-14 DIAGNOSIS — Z12.11 SCREENING FOR COLON CANCER: ICD-10-CM

## 2025-07-14 PROCEDURE — A9585 GADOBUTROL INJECTION: HCPCS | Performed by: INTERNAL MEDICINE

## 2025-07-14 PROCEDURE — 74183 MRI ABD W/O CNTR FLWD CNTR: CPT

## 2025-07-14 PROCEDURE — 255N000002 HC RX 255 OP 636: Performed by: INTERNAL MEDICINE

## 2025-07-14 RX ORDER — GADOBUTROL 604.72 MG/ML
5 INJECTION INTRAVENOUS ONCE
Status: COMPLETED | OUTPATIENT
Start: 2025-07-14 | End: 2025-07-14

## 2025-07-14 RX ADMIN — GADOBUTROL 5 ML: 604.72 INJECTION INTRAVENOUS at 16:03

## 2025-07-21 ENCOUNTER — APPOINTMENT (OUTPATIENT)
Dept: LAB | Facility: HOSPITAL | Age: 65
End: 2025-07-21
Payer: COMMERCIAL

## 2025-07-21 PROCEDURE — 84443 ASSAY THYROID STIM HORMONE: CPT | Performed by: FAMILY MEDICINE

## 2025-07-21 PROCEDURE — 84480 ASSAY TRIIODOTHYRONINE (T3): CPT | Performed by: FAMILY MEDICINE

## 2025-07-21 PROCEDURE — 84439 ASSAY OF FREE THYROXINE: CPT | Performed by: FAMILY MEDICINE

## 2025-07-21 PROCEDURE — 86376 MICROSOMAL ANTIBODY EACH: CPT | Performed by: FAMILY MEDICINE

## 2025-07-22 ENCOUNTER — VIRTUAL VISIT (OUTPATIENT)
Dept: ENDOCRINOLOGY | Facility: CLINIC | Age: 65
End: 2025-07-22
Payer: COMMERCIAL

## 2025-07-22 DIAGNOSIS — M81.8 OTHER OSTEOPOROSIS WITHOUT CURRENT PATHOLOGICAL FRACTURE: Primary | ICD-10-CM

## 2025-07-22 PROBLEM — R87.810 HIGH-RISK HUMAN PAPILLOMAVIRUS (HPV) DNA DETECTED IN CERVICAL SPECIMEN: Status: ACTIVE | Noted: 2025-04-08

## 2025-07-22 PROBLEM — R87.613 HIGH GRADE SQUAMOUS INTRAEPITHELIAL LESION ON CYTOLOGIC SMEAR OF CERVIX (HGSIL): Status: ACTIVE | Noted: 2025-04-08

## 2025-07-22 PROCEDURE — 98006 SYNCH AUDIO-VIDEO EST MOD 30: CPT | Performed by: NURSE PRACTITIONER

## 2025-07-22 NOTE — PROGRESS NOTES
[facility]  ENDOCRINOLOGY    Osteoporosis Follow Up 7/22/2025    Katina Hoffman, 1960, [id number]          Reason for visit      1. Other osteoporosis without current pathological fracture        History     Katina Hoffman is a very pleasant 65 year old old female who presents for follow up.   SUMMARY:    Adilene is seen today in follow-up for Osteoporosis. She continues on Prolia injections without difficulties. She has had no falls in the last year. Her most recent A1c was INTERVAL CHANGE-There has been a 2.4% increase in lumbar spine BMD. -There has also been a 1.7% increase in hip BMD. She will be having her next one done on 8/8/25. Her current Vit D level is 30 and  normal. Calcium level is 9.3 and good. Creatinine level is normal as well.  She remains quite active, Biking, doing Elliptical, and machine weights.       Risk Factors     The following high- risk conditions have been ruled out: celiac disease, eating disorders, gastric bypass, hyperparathyroidism, inflammatory bowel disease, hyperthyroidism, rheumatoid arthritis, lupus, chronic kidney disease.     Katina Hoffman has the following risk factors: Age, Female gender, , Low BMI, Family history of osteoporosis and Early menopause (<45)     She is not on high risk medications such as glucocorticoids, anti-coagulants, anti-convulsants, chemotherapy.    Patient deniesHysterectomy, Oophrectomy, Breast cancer and Family history of breast cancer.    Menopause was at age: early 40s.      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    Social History      reports that she has never smoked. She has never used smokeless tobacco. She reports current alcohol use of about 2.0 standard drinks of alcohol per week. She reports that she does not use drugs.      Review of Systems     Patient denies current pain, limited mobility, fractures.   Remainder per HPI.      Vital Signs     There were no vitals taken for this visit.    Physical Exam     Constitutional:  Well developed, Well nourished  HENT:  Normocephalic,   Neck: normal in appearance  Eyes:  PERRL, Conjunctiva pink  Respiratory:  No respiratory distress  Skin: No acanthosis nigricans, lipoatrophy or lipodystrophy  Neurologic:  Alert & oriented x 3, nonfocal  Psychiatric:  Affect, Mood, Insight appropriate      Assessment     1. Other osteoporosis without current pathological fracture        Plan       Adilene will continue on the Prolia injections. Follow-up with me in 1 year.          Katelyn Macias NP  [facility]  7/22/2025  9:54 AM    Current Medications     Outpatient Medications Prior to Visit   Medication Sig Dispense Refill    acyclovir (ZOVIRAX) 200 MG capsule Take 2 capsules (400 mg) by mouth every 12 hours 120 capsule 3    EPINEPHrine (ANY BX GENERIC EQUIV) 0.3 MG/0.3ML injection 2-pack Inject 0.3 mLs (0.3 mg) into the muscle as needed for anaphylaxis May repeat one time in 5-15 minutes if response to initial dose is inadequate. 2 each 1    hydrocortisone (PROCTOSOL HC) 2.5 % rectal cream [HYDROCORTISONE (PROCTOSOL HC) 2.5 % RECTAL CREAM] Apply to the affected area twice daily as needed. 28.35 g 0    ketorolac (TORADOL) 10 MG  tablet Take 1 tablet (10 mg) by mouth every 6 hours as needed for moderate pain 30 tablet 1    naproxen-diphenhydramine (ALEVE PM) 220-25 mg Tab [NAPROXEN-DIPHENHYDRAMINE (ALEVE PM) 220-25 MG TAB] Take 1 tablet by mouth daily as needed.      ondansetron (ZOFRAN-ODT) 4 MG disintegrating tablet [ONDANSETRON (ZOFRAN-ODT) 4 MG DISINTEGRATING TABLET] DISSOLVE 1 TABLET(4 MG) ON THE TONGUE EVERY 8 HOURS AS NEEDED FOR NAUSEA 12 tablet 1    pantoprazole (PROTONIX) 40 MG EC tablet Take 40 mg by mouth daily.      SUMAtriptan (IMITREX) 50 MG tablet at onset of headache.       Facility-Administered Medications Prior to Visit   Medication Dose Route Frequency Provider Last Rate Last Admin    denosumab (PROLIA) injection 60 mg  60 mg Subcutaneous Q6 Months Katelyn Macias, NP   60 mg at 07/09/25 1541         Lab Results     TSH   Date Value Ref Range Status   07/21/2025 3.35 0.30 - 4.20 uIU/mL Final   01/05/2021 3.15 0.30 - 5.00 uIU/mL Final     Phosphorus   Date Value Ref Range Status   04/13/2018 3.4 2.5 - 4.5 mg/dL Final           Imaging Results   Last DEXA scan:  No valid procedures specified.    Narrative & Impression   EXAM: DX HIP/PELVIS/SPINE  LOCATION: [facility]  DATE/TIME: 4/7/2023 10:09 AM     INDICATION: Osteoporosis without current pathological fracture, unspecified osteoporosis type.  DEMOGRAPHICS: Age- 62 years. Gender- Female.  COMPARISON: DEXA from 04/05/2021.  TECHNIQUE: Dual-energy x-ray absorptiometry (DXA) performed with routine technique. Trabecular bone score (TBS) analysis performed.     FINDINGS:     DXA RESULTS  -Lumbar Spine: L1-L4: BMD: 0.987 g/cm2. T-score: -1.6. Z-score: -0.3.  -RIGHT Hip Total: BMD: 0.838 g/cm2. T-score: -1.3. Z-score: -0.4.  -RIGHT Hip Femoral neck: BMD: 0.790 g/cm2. T-score: -1.8. Z-score: -0.5.  -LEFT Hip Total: BMD: 0.765 g/cm2. T-score: -1.9. Z-score: -1.0.  -LEFT Hip Femoral neck: BMD: 0.732 g/cm2. T-score: -2.2. Z-score: -0.9.        WHO T-SCORE  CRITERIA  -Normal: T score at or above -1 SD  -Osteopenia: T score between -1 and -2.5 SD  -Osteoporosis: T score at or below -2.5 SD     The World Health Organization (WHO) criteria is applicable to perimenopausal females, postmenopausal females, and men aged 50 years or older.     TBS RESULTS  -Lumbar Spine L1-L4: TBS T-score: -1.3.TBS Z-score: 0.5.     The TBS is a DXA derived measurement for fracture risk assessment, and reflects the structural condition of the bone microarchitecture. It can be used to adjust WHO Fracture Risk Assessment Tool (FRAX) probability of fracture in postmenopausal women and   older men. The calculated probabilities of fracture have been shown to be more accurate when computed with the TBS.     INTERVAL CHANGE  -There has been a 2.4% increase in lumbar spine BMD.  -There has also been a 1.7% increase in hip BMD.         FRACTURE RISK  -FRAX Results: The 10 year probability of major osteoporotic fracture is 8.8%, and of hip fracture is 1.5%, based on the left femoral neck BMD.  -TBS-adjusted FRAX Results: The 10 year probability of major osteoporotic fracture is 8.3%, and of hip fracture is 1.2%.     RECOMMENDATIONS  Consider treatment if major osteoporotic fracture score is greater than or equal to 20%, and if the hip fracture score is greater than or equal to 3%.                                                                      IMPRESSION: Low bone density (OSTEOPENIA). T score meets the WHO criteria for low bone density (osteopenia) at one or more measured sites. The risk of osteoporotic fracture increases approximately two-fold for each standard deviation decrease in T-score.       Virtual Visit Details    Type of service:  Video Visit     Originating Location (pt. Location): Home    Distant Location (provider location):  On-site  Platform used for Video Visit: Bertram

## 2025-07-22 NOTE — LETTER
7/22/2025      Katina Hoffman  [address] [facility] [address]      Dear Colleague,    Thank you for referring your patient, Katina Hoffman, to the [facility]. Please see a copy of my visit note below.    [facility]  ENDOCRINOLOGY    Osteoporosis Follow Up 7/22/2025    Katina Hoffman, 1960, [id number]          Reason for visit      1. Other osteoporosis without current pathological fracture        History     Katina Hoffman is a very pleasant 65 year old old female who presents for follow up.   SUMMARY:    Adilene is seen today in follow-up for Osteoporosis. She continues on Prolia injections without difficulties. She has had no falls in the last year. Her most recent A1c was INTERVAL CHANGE-There has been a 2.4% increase in lumbar spine BMD. -There has also been a 1.7% increase in hip BMD. She will be having her next one done on 8/8/25. Her current Vit D level is 30 and  normal. Calcium level is 9.3 and good. Creatinine level is normal as well.  She remains quite active, Biking, doing Elliptical, and machine weights.       Risk Factors     The following high- risk conditions have been ruled out: celiac disease, eating disorders, gastric bypass, hyperparathyroidism, inflammatory bowel disease, hyperthyroidism, rheumatoid arthritis, lupus, chronic kidney disease.     Katina Hoffman has the following risk factors: Age, Female gender, , Low BMI, Family history of osteoporosis and Early menopause (<45)     She is not on high risk medications such as glucocorticoids, anti-coagulants, anti-convulsants, chemotherapy.    Patient deniesHysterectomy, Oophrectomy, Breast cancer and Family history of breast cancer.    Menopause was at age: early 40s.      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    Social History      reports that she has never smoked. She has never used smokeless tobacco. She reports current alcohol use of about 2.0 standard drinks of alcohol per week. She reports that she does not use drugs.      Review of Systems     Patient denies current pain, limited mobility, fractures.   Remainder per HPI.      Vital Signs     There were no vitals taken for this visit.    Physical Exam     Constitutional:  Well developed, Well nourished  HENT:  Normocephalic,   Neck: normal in appearance  Eyes:  PERRL, Conjunctiva pink  Respiratory:  No respiratory distress  Skin: No acanthosis nigricans, lipoatrophy or lipodystrophy  Neurologic:  Alert & oriented x 3, nonfocal  Psychiatric:  Affect, Mood, Insight appropriate      Assessment     1. Other osteoporosis without current pathological fracture        Plan       Adilene will continue on the Prolia injections. Follow-up with me in 1 year.          Katelyn Macias NP  [facility]  7/22/2025  9:54 AM    Current Medications     Outpatient Medications Prior to Visit   Medication Sig Dispense Refill     acyclovir (ZOVIRAX) 200 MG capsule Take 2 capsules (400 mg) by mouth every 12 hours 120 capsule 3     EPINEPHrine (ANY BX GENERIC EQUIV) 0.3 MG/0.3ML injection 2-pack Inject 0.3 mLs (0.3 mg) into the muscle as needed for anaphylaxis May repeat one time in 5-15 minutes  if response to initial dose is inadequate. 2 each 1     hydrocortisone (PROCTOSOL HC) 2.5 % rectal cream [HYDROCORTISONE (PROCTOSOL HC) 2.5 % RECTAL CREAM] Apply to the affected area twice daily as needed. 28.35 g 0     ketorolac (TORADOL) 10 MG tablet Take 1 tablet (10 mg) by mouth every 6 hours as needed for moderate pain 30 tablet 1     naproxen-diphenhydramine (ALEVE PM) 220-25 mg Tab [NAPROXEN-DIPHENHYDRAMINE (ALEVE PM) 220-25 MG TAB] Take 1 tablet by mouth daily as needed.       ondansetron (ZOFRAN-ODT) 4 MG disintegrating tablet [ONDANSETRON (ZOFRAN-ODT) 4 MG DISINTEGRATING TABLET] DISSOLVE 1 TABLET(4 MG) ON THE TONGUE EVERY 8 HOURS AS NEEDED FOR NAUSEA 12 tablet 1     pantoprazole (PROTONIX) 40 MG EC tablet Take 40 mg by mouth daily.       SUMAtriptan (IMITREX) 50 MG tablet at onset of headache.       Facility-Administered Medications Prior to Visit   Medication Dose Route Frequency Provider Last Rate Last Admin     denosumab (PROLIA) injection 60 mg  60 mg Subcutaneous Q6 Months Katelyn Macias, NP   60 mg at 07/09/25 1541         Lab Results     TSH   Date Value Ref Range Status   07/21/2025 3.35 0.30 - 4.20 uIU/mL Final   01/05/2021 3.15 0.30 - 5.00 uIU/mL Final     Phosphorus   Date Value Ref Range Status   04/13/2018 3.4 2.5 - 4.5 mg/dL Final           Imaging Results   Last DEXA scan:  No valid procedures specified.    Narrative & Impression   EXAM: DX HIP/PELVIS/SPINE  LOCATION: [facility]  DATE/TIME: 4/7/2023 10:09 AM     INDICATION: Osteoporosis without current pathological fracture, unspecified osteoporosis type.  DEMOGRAPHICS: Age- 62 years. Gender- Female.  COMPARISON: DEXA from 04/05/2021.  TECHNIQUE: Dual-energy x-ray absorptiometry (DXA) performed with routine technique. Trabecular bone score (TBS) analysis performed.     FINDINGS:     DXA RESULTS  -Lumbar Spine: L1-L4: BMD: 0.987 g/cm2. T-score: -1.6. Z-score: -0.3.  -RIGHT Hip Total: BMD: 0.838 g/cm2. T-score: -1.3.  Z-score: -0.4.  -RIGHT Hip Femoral neck: BMD: 0.790 g/cm2. T-score: -1.8. Z-score: -0.5.  -LEFT Hip Total: BMD: 0.765 g/cm2. T-score: -1.9. Z-score: -1.0.  -LEFT Hip Femoral neck: BMD: 0.732 g/cm2. T-score: -2.2. Z-score: -0.9.        WHO T-SCORE CRITERIA  -Normal: T score at or above -1 SD  -Osteopenia: T score between -1 and -2.5 SD  -Osteoporosis: T score at or below -2.5 SD     The World Health Organization (WHO) criteria is applicable to perimenopausal females, postmenopausal females, and men aged 50 years or older.     TBS RESULTS  -Lumbar Spine L1-L4: TBS T-score: -1.3.TBS Z-score: 0.5.     The TBS is a DXA derived measurement for fracture risk assessment, and reflects the structural condition of the bone microarchitecture. It can be used to adjust WHO Fracture Risk Assessment Tool (FRAX) probability of fracture in postmenopausal women and   older men. The calculated probabilities of fracture have been shown to be more accurate when computed with the TBS.     INTERVAL CHANGE  -There has been a 2.4% increase in lumbar spine BMD.  -There has also been a 1.7% increase in hip BMD.         FRACTURE RISK  -FRAX Results: The 10 year probability of major osteoporotic fracture is 8.8%, and of hip fracture is 1.5%, based on the left femoral neck BMD.  -TBS-adjusted FRAX Results: The 10 year probability of major osteoporotic fracture is 8.3%, and of hip fracture is 1.2%.     RECOMMENDATIONS  Consider treatment if major osteoporotic fracture score is greater than or equal to 20%, and if the hip fracture score is greater than or equal to 3%.                                                                      IMPRESSION: Low bone density (OSTEOPENIA). T score meets the WHO criteria for low bone density (osteopenia) at one or more measured sites. The risk of osteoporotic fracture increases approximately two-fold for each standard deviation decrease in T-score.       Virtual Visit Details    Type of service:  Video Visit      Originating Location (pt. Location): Home    Distant Location (provider location):  On-site  Platform used for Video Visit: Bertram      Again, thank you for allowing me to participate in the care of your patient.        Sincerely,        Katelyn Macias NP    Electronically signed

## 2025-08-04 ENCOUNTER — LAB REQUISITION (OUTPATIENT)
Dept: LAB | Facility: CLINIC | Age: 65
End: 2025-08-04

## 2025-08-04 DIAGNOSIS — Z12.4 ENCOUNTER FOR SCREENING FOR MALIGNANT NEOPLASM OF CERVIX: ICD-10-CM

## 2025-08-04 PROCEDURE — G0145 SCR C/V CYTO,THINLAYER,RESCR: HCPCS | Performed by: OBSTETRICS & GYNECOLOGY

## 2025-08-04 PROCEDURE — G0124 SCREEN C/V THIN LAYER BY MD: HCPCS | Performed by: PATHOLOGY

## 2025-08-08 ENCOUNTER — HOSPITAL ENCOUNTER (OUTPATIENT)
Dept: BONE DENSITY | Facility: HOSPITAL | Age: 65
Discharge: HOME | End: 2025-08-08
Attending: NURSE PRACTITIONER | Admitting: NURSE PRACTITIONER
Payer: COMMERCIAL

## 2025-08-08 DIAGNOSIS — M81.8 OTHER OSTEOPOROSIS WITHOUT CURRENT PATHOLOGICAL FRACTURE: ICD-10-CM

## 2025-08-08 PROCEDURE — 77080 DXA BONE DENSITY AXIAL: CPT

## 2025-08-12 ENCOUNTER — RESULTS FOLLOW-UP (OUTPATIENT)
Dept: ENDOCRINOLOGY | Facility: CLINIC | Age: 65
End: 2025-08-12
Payer: COMMERCIAL